# Patient Record
Sex: FEMALE | Race: WHITE | NOT HISPANIC OR LATINO | Employment: PART TIME | URBAN - NONMETROPOLITAN AREA
[De-identification: names, ages, dates, MRNs, and addresses within clinical notes are randomized per-mention and may not be internally consistent; named-entity substitution may affect disease eponyms.]

---

## 2017-01-12 ENCOUNTER — HISTORY (OUTPATIENT)
Dept: FAMILY MEDICINE | Facility: OTHER | Age: 42
End: 2017-01-12

## 2017-01-12 ENCOUNTER — OFFICE VISIT - GICH (OUTPATIENT)
Dept: FAMILY MEDICINE | Facility: OTHER | Age: 42
End: 2017-01-12

## 2017-01-12 DIAGNOSIS — B97.89 OTHER VIRAL AGENTS AS THE CAUSE OF DISEASES CLASSIFIED ELSEWHERE: ICD-10-CM

## 2017-01-12 DIAGNOSIS — R09.82 POSTNASAL DRIP: ICD-10-CM

## 2017-01-12 DIAGNOSIS — J02.9 ACUTE PHARYNGITIS: ICD-10-CM

## 2017-01-12 DIAGNOSIS — J06.9 ACUTE UPPER RESPIRATORY INFECTION: ICD-10-CM

## 2017-01-12 LAB — STREP A ANTIGEN - HISTORICAL: NEGATIVE

## 2017-03-06 ENCOUNTER — HISTORY (OUTPATIENT)
Dept: EMERGENCY MEDICINE | Facility: OTHER | Age: 42
End: 2017-03-06

## 2017-05-26 ENCOUNTER — OFFICE VISIT - GICH (OUTPATIENT)
Dept: OBGYN | Facility: OTHER | Age: 42
End: 2017-05-26

## 2017-05-26 DIAGNOSIS — Z98.890 OTHER SPECIFIED POSTPROCEDURAL STATES: ICD-10-CM

## 2017-06-01 LAB — HPV RESULTS - HISTORICAL: NEGATIVE

## 2017-06-08 ENCOUNTER — COMMUNICATION - GICH (OUTPATIENT)
Dept: FAMILY MEDICINE | Facility: OTHER | Age: 42
End: 2017-06-08

## 2017-06-23 ENCOUNTER — HISTORY (OUTPATIENT)
Dept: FAMILY MEDICINE | Facility: OTHER | Age: 42
End: 2017-06-23

## 2017-06-23 ENCOUNTER — OFFICE VISIT - GICH (OUTPATIENT)
Dept: FAMILY MEDICINE | Facility: OTHER | Age: 42
End: 2017-06-23

## 2017-06-23 DIAGNOSIS — E55.9 VITAMIN D DEFICIENCY: ICD-10-CM

## 2017-06-23 DIAGNOSIS — M25.571 PAIN IN RIGHT ANKLE: ICD-10-CM

## 2017-06-23 DIAGNOSIS — L65.9 NONSCARRING HAIR LOSS: ICD-10-CM

## 2017-06-23 DIAGNOSIS — A63.0 ANOGENITAL (VENEREAL) WARTS: ICD-10-CM

## 2017-06-23 LAB — VITAMIN D TOTAL - HISTORICAL: 22.2 NG/ML

## 2017-06-26 LAB
HEPATITIS C ANTIBODY CATEGORY - HISTORICAL: NORMAL
HIV-1/HIV-2 ANTIBODY CATEGORY - HISTORICAL: NORMAL

## 2017-06-27 LAB — TREPONEMA PALLIDUM - HISTORICAL: NEGATIVE

## 2017-08-24 ENCOUNTER — OFFICE VISIT - GICH (OUTPATIENT)
Dept: FAMILY MEDICINE | Facility: OTHER | Age: 42
End: 2017-08-24

## 2017-08-24 ENCOUNTER — HISTORY (OUTPATIENT)
Dept: FAMILY MEDICINE | Facility: OTHER | Age: 42
End: 2017-08-24

## 2017-08-24 DIAGNOSIS — N92.6 IRREGULAR MENSTRUATION: ICD-10-CM

## 2017-08-24 DIAGNOSIS — M25.371 OTHER INSTABILITY, RIGHT ANKLE: ICD-10-CM

## 2017-08-25 ENCOUNTER — HOSPITAL ENCOUNTER (OUTPATIENT)
Dept: RADIOLOGY | Facility: OTHER | Age: 42
End: 2017-08-25
Attending: FAMILY MEDICINE

## 2017-08-25 DIAGNOSIS — M25.371 OTHER INSTABILITY, RIGHT ANKLE: ICD-10-CM

## 2017-08-26 ENCOUNTER — AMBULATORY - GICH (OUTPATIENT)
Dept: FAMILY MEDICINE | Facility: OTHER | Age: 42
End: 2017-08-26

## 2017-08-26 DIAGNOSIS — S93.491D SPRAIN OF OTHER LIGAMENT OF RIGHT ANKLE, SUBSEQUENT ENCOUNTER: ICD-10-CM

## 2017-08-26 DIAGNOSIS — S93.421D SPRAIN OF DELTOID LIGAMENT OF RIGHT ANKLE, SUBSEQUENT ENCOUNTER: ICD-10-CM

## 2017-09-01 ENCOUNTER — COMMUNICATION - GICH (OUTPATIENT)
Dept: FAMILY MEDICINE | Facility: OTHER | Age: 42
End: 2017-09-01

## 2017-09-01 ENCOUNTER — HOSPITAL ENCOUNTER (OUTPATIENT)
Dept: RADIOLOGY | Facility: OTHER | Age: 42
End: 2017-09-01
Attending: FAMILY MEDICINE

## 2017-09-01 DIAGNOSIS — N92.6 IRREGULAR MENSTRUATION: ICD-10-CM

## 2017-09-28 ENCOUNTER — AMBULATORY - GICH (OUTPATIENT)
Dept: SCHEDULING | Facility: OTHER | Age: 42
End: 2017-09-28

## 2017-10-16 ENCOUNTER — OFFICE VISIT - GICH (OUTPATIENT)
Dept: FAMILY MEDICINE | Facility: OTHER | Age: 42
End: 2017-10-16

## 2017-10-16 ENCOUNTER — HISTORY (OUTPATIENT)
Dept: FAMILY MEDICINE | Facility: OTHER | Age: 42
End: 2017-10-16

## 2017-10-16 DIAGNOSIS — G43.109 MIGRAINE WITH AURA AND WITHOUT STATUS MIGRAINOSUS, NOT INTRACTABLE: ICD-10-CM

## 2017-10-16 DIAGNOSIS — M25.571 PAIN IN RIGHT ANKLE: ICD-10-CM

## 2017-10-16 DIAGNOSIS — Z01.810 ENCOUNTER FOR PREPROCEDURAL CARDIOVASCULAR EXAMINATION: ICD-10-CM

## 2017-10-16 LAB
HCG UR QL: NEGATIVE
HEMOGLOBIN: 13 G/DL (ref 12–16)
MCV RBC AUTO: 93 FL (ref 80–100)

## 2017-10-18 ENCOUNTER — AMBULATORY - GICH (OUTPATIENT)
Dept: FAMILY MEDICINE | Facility: OTHER | Age: 42
End: 2017-10-18

## 2017-10-18 DIAGNOSIS — Z01.810 ENCOUNTER FOR PREPROCEDURAL CARDIOVASCULAR EXAMINATION: ICD-10-CM

## 2017-10-18 DIAGNOSIS — M25.571 PAIN IN RIGHT ANKLE: ICD-10-CM

## 2017-10-20 ENCOUNTER — AMBULATORY - GICH (OUTPATIENT)
Dept: FAMILY MEDICINE | Facility: OTHER | Age: 42
End: 2017-10-20

## 2017-10-20 DIAGNOSIS — Z01.810 ENCOUNTER FOR PREPROCEDURAL CARDIOVASCULAR EXAMINATION: ICD-10-CM

## 2017-11-06 ENCOUNTER — AMBULATORY - GICH (OUTPATIENT)
Dept: SCHEDULING | Facility: OTHER | Age: 42
End: 2017-11-06

## 2017-11-09 ENCOUNTER — HISTORY (OUTPATIENT)
Dept: FAMILY MEDICINE | Facility: OTHER | Age: 42
End: 2017-11-09

## 2017-12-28 NOTE — PROGRESS NOTES
Patient Information     Patient Name MRN Sex Bibaina Agustin [id number] Female 1975      Progress Notes by Margoth Ramachandran DO at 2017  9:30 AM     Author:  Margoth Ramachandran DO Service:  (none) Author Type:  PHYS- Osteopathic     Filed:  2017  7:31 PM Encounter Date:  2017 Status:  Signed     :  Margoth Ramachandran DO (PHYS- Osteopathic)            SUBJECTIVE:  Bibiana Lira is a 41 y.o. female who presents for multiple concerns    HPI  Bibiana is here for:  1. R ankle instability feeling.  Had normal xray after strain this spring.  Continues to feel instability.  Not able to wear heels.  Feels like she could fall while walking on uneven ground, has difficulty gardening.  Can begin throbbing/aching and almost feel swollen on the R front outside portion of ankle.  Tender around the malleoli b/l on R ankle.  She had a prior fracture of this ankle in  without need for surgical repair.    2. Periods.  Has had intermittent spotting between menstrual cycles.  Not on any contraception.  Has been going on for ~1.5 years.  Not every month.  Has had normal pap smear 2016.  She is concerned with any abnormalities in the uterus.    3. Ear pains.  Feels like she has to use Qtips and try harder at releasing any fluid in her ears.  Wondering if there is anything wrong.  Not that bothersome, but she thought since she was here she would have it looked at.  No affect on hearing.    Allergies      Allergen   Reactions     Dairy [Lactase]  *Unknown     Had muscle testing done       Gluten  Intolerance-Can't Take     Orange  *Unknown     Had muscle testing- reacted to oranges       Sulfa (Sulfonamide Antibiotics)  Erythema     Sulfa (Sulfonamide Antibiotics)       Verified in [employer]: Y, Severity in [employer]: S      Sulfadiazine  Rash   ,   Current Outpatient Prescriptions on File Prior to Visit       Medication  Sig Dispense Refill     SUMAtriptan (IMITREX) 25 mg tablet Take 0.5-1 tablets by  mouth every 2 hours if needed for Migraine. Max dose: 200mg per 24 hrs. 20 tablet 2     No current facility-administered medications on file prior to visit.     and   Past Medical History:     Diagnosis  Date     Hx of pregnancy     , both vaginal deliveries.  Prolapsed uterus with first delivery.      Irritable bowel syndrome (IBS)     avoids caramel color; gluten free        REVIEW OF SYSTEMS:  Review of Systems   All other systems reviewed and are negative.      OBJECTIVE:  /70  Pulse 76  Wt 69.4 kg (153 lb)  BMI 25.46 kg/m2    EXAM:   Physical Exam   Constitutional: She is well-developed, well-nourished, and in no distress.   HENT:   Head: Normocephalic and atraumatic.   Right Ear: External ear normal.   Left Ear: External ear normal.   Eyes: EOM are normal. Pupils are equal, round, and reactive to light.   Neck: Normal range of motion. Neck supple.   Cardiovascular: Normal rate and normal heart sounds.    Pulmonary/Chest: Effort normal and breath sounds normal.   Abdominal: Soft. Bowel sounds are normal.   Musculoskeletal:        Right ankle: She exhibits normal range of motion, no swelling, no ecchymosis, no deformity, no laceration and normal pulse. Tenderness. Lateral malleolus (mild) and medial malleolus (mild) tenderness found. Achilles tendon exhibits no pain, no defect and normal Willis's test results.   Psychiatric: Mood and affect normal.   Nursing note and vitals reviewed.      ASSESSMENT/PLAN:    ICD-10-CM    1. Right ankle instability M25.371 MR ANKLE RIGHT WO   2. Menstrual irregularity N92.6 US PELVIS COMPLETE TA AND TV        Plan:  1. R ankle instability.  Not high probability for fracture, but concern for ligament laxity.  Reviewed need for further imaging.  Will plan for MRI.  Encouraged bracing which was not done between last visit and now.  Will consider referral to foot/ankle if abnormality exists.    2. Menstrual cycle with breakthrough bleeding.  Reassurance given, as  likely the start of some perimenstrual changes.  Would be more concerning if periods become irregular.  However, patient remains anxious about possibility of more ominous sign, due to an experience of a personal friend.  Will complete pelvic US to evaluate for any fibroid or other cause.  OBGYN referral if abnormal.    Reassurance re: otalgia.    Follow up pending above evaluation.

## 2017-12-28 NOTE — PROGRESS NOTES
Patient Information     Patient Name MRN Bibiana Olmos [id number] Female 1975      Progress Notes by Sandra Concepcion RN at 10/20/2017  8:45 AM     Author:  Sandra Concepcion RN Service:  (none) Author Type:  NURS- Registered Nurse     Filed:  10/20/2017  9:06 AM Encounter Date:  10/20/2017 Status:  Signed     :  Sandra Concepcion RN (NURS- Registered Nurse)            Patient here for EKG ordered by Cherie Doe at pre-op exam for surgery. Copy of EKG sent to [facility] to fax to [facility] and copy to scan into chart. SANDRA CONCEPCION RN ....................  10/20/2017   8:58 AM

## 2017-12-28 NOTE — PROGRESS NOTES
Patient Information     Patient Name MRN Sex Bibiana Agustin [id number] Female 1975      Progress Notes by Audra Tidwell R.T. ([facility] at 2017 11:12 AM     Author:  Audra Tidwell R.T. ([facility]RT) Service:  (none) Author Type:  [employer] - Registered Radiologic Technologist     Filed:  2017 11:12 AM Date of Service:  2017 11:12 AM Status:  Signed     :  Audra Tidwell R.T. (ARRT) ([employer] - Registered Radiologic Technologist)            Falls Risk Criteria:    Age 65 and older or under age 4        Sensory deficits    Poor vision    Use of ambulatory aides    Impaired judgment    Unable to walk independently    Meets High Risk criteria for falls:  no

## 2017-12-28 NOTE — PROGRESS NOTES
Patient Information     Patient Name MRN Sex Bibiana Agustin [id number] Female 1975      Progress Notes by Cherie Doe PA-C at 10/16/2017 10:45 AM     Author:  Cherie Doe PA-C Service:  (none) Author Type:  PHYS- Physician Assistant     Filed:  10/16/2017 12:20 PM Encounter Date:  10/16/2017 Status:  Signed     :  Cherie Doe PA-C ([facility] Physician Assistant)            ----------------- PREOPERATIVE EXAM ------------------  10/16/2017    SUBJECTIVE:  Bibiana Lira is a 42 y.o. female here for preoperative optimization.    I was asked to see Bibiana Lira by Dr. Ren for a preoperative optimization due to history of migraines.     Patient has a history of right ankle pain in 2017 s/p fall on ice.  Ankle is stiff in the morning when she wakes up.  Aggravated by uneven surfaces.     Patient is otherwise feeling fine. No recent fevers, chills, chest pain, palpitations, problems breathing, GI symptoms, urinary symptoms. Takes ibuprofen for migraines. Stable migraines. No cough or cold symptoms.     Nursing Notes:   Bibiana Escoto  10/16/2017 11:24 AM  Signed  Date of Surgery: 10/26/17  Type of Surgery: Right ankle ligament repair  Surgeon: Dr. Ren  Hospital[facility]  [facility]      Fever/Chills or other infectious symptoms in past month: Sinus drainage  >10lb weight loss in past two months: no    Health Care Directive/Code status:  Full Code  Hx of blood transfusions:   (NO)   Td up to date:  12  History of VRE/MRSA:  (NO) Date: N/A    Preoperative Evaluation: Obstructive Sleep Apnea screening    S: Snore -  Do you snore loudly? (louder than talking or loud enough to be heard through closed doors)(NO)  T: Tired - Do you often feel tired, fatigued, or sleepy during the daytime?(YES)  O: Observed - Has anyone ever observed you stop breathing during your sleep?(NO)  P: Pressure - Do you have or are you being treated for high blood pressure?(NO)  B: BMI - BMI  "greater than 35kg/m2?(NO)  A: Age - Age over 50 years old?(NO)  N: Neck - Neck circumference greater than 40 cm?(NO)  G: Gender - Gender: Male?(NO)    Total number of \"YES\" responses:  1    Scoring: Low risk of SELENA 0-2  At Risk of SELENA: >3 High Risk of SELENA: 5-8    Bibianamendy Escoto LPN........................10/16/2017  11:12 AM          Patient Active Problem List       Diagnosis  Date Noted     Menstrual irregularity  2017     Breakthrough spotting started Feb-2016: off and on.        Right ankle instability  2017     S/p fracture in  and severe sprain early .        Migraine, unspecified, without mention of intractable migraine without mention of status migrainosus  2009     Migraine Headache            Past Medical History:     Diagnosis  Date     Hx of pregnancy     , both vaginal deliveries.  Prolapsed uterus with first delivery.      Irritable bowel syndrome (IBS)     avoids caramel color; gluten free        Past Surgical History:      Procedure  Laterality Date     WISDOM TEETH EXTRACTION      [facility] teeth extraction         Current Outpatient Prescriptions       Medication  Sig Dispense Refill     SUMAtriptan (IMITREX) 25 mg tablet Take 0.5-1 tablets by mouth every 2 hours if needed for Migraine. Max dose: 200mg per 24 hrs. 20 tablet 2     No current facility-administered medications for this visit.      Medications have been reviewed by me and are current to the best of my knowledge and ability.    Recent use of: no recent use of aspirin (ASA) or steroids. Recent use of ibuprofen. Instructed to hold use of ibuprofen 7 days prior to surgery.     Allergies:  Allergies      Allergen   Reactions     Dairy [Lactase]  *Unknown     Had muscle testing done       Gluten  Intolerance-Can't Take     Orange  *Unknown     Had muscle testing- reacted to oranges       Sulfa (Sulfonamide Antibiotics)  Erythema     Sulfa (Sulfonamide Antibiotics)       Verified in [employer]: Y, Severity in " [employer]: S      Sulfadiazine  Rash       Latex allergy  no    Family History       Problem   Relation Age of Onset     Diabetes  Mother      Hypertension  Mother      Heart Disease  Mother      pacemaker       Other  Mother      Stoke x 2       Hyperlipidemia  Mother      Other  Father      Actinic keratoses       Hypertension  Father      Hyperlipidemia  Father      Diabetes type II  Father      Good Health  Brother      Other  Sister      Gluten intolerance         Denies family hx of bleeding tendencies, anesthesia complications, or other problems with surgery.      Social History       Substance Use Topics         Smoking status:   Never Smoker     Smokeless tobacco:   Never Used     Alcohol use   Yes      Comment: rare          ROS:    surgical:  patient denies previous complications from prior surgeries including but not limited to prolonged bleeding, anesthesia complications, dysrhythmias, surgical wound infections, or prolonged hospital stay.    Patient previously had wisdom teeth surgery. She states that she was nauseas a few days after the surgery from the anesthesia. She does not recall what type of anesthesia medication they used. She also states that it took her longer than normal to come out of anesthesia and was groggy for an extended period of time after waking up from the anesthesia.       REVIEW OF SYSTEMS:  A comprehensive review of systems was negative except for items noted in HPI/Subjective.    Constitutional: Negative for fever , chills  and fatigue .   Eyes: Negative for irritation  and redness .  Ears, nose, mouth, throat, and face: Negative for ear drainage , nasal congestion , sore throat and hoarseness .  Respiratory: Negative for cough, sputum production , hemoptysis, dyspnea  and wheezing .  Cardiovascular: Negative for chest discomfort, palpitations and lightheadedness .  Gastrointestinal: Negative for dysphagia , nausea , vomiting , melena , diarrhea , constipation  and abdominal  "pain.  Genitourinary:Negative for frequency, dysuria , urinary incontinence , hematuria.  Integument/breast: Negative for rash .   Hematologic/lymphatic: Negative for easy bruising , bleeding, lymphadenopathy  and petechiae.   Musculoskeletal:Negative for myalgias and arthralgias .  Neurological: Negative for headaches, dizziness , vertigo , seizures  and weakness.   Psychiatric: Negative for anxiety , depression , panic attacks  and suicidal ideations .        -------------------------------------------------------------    PHYSICAL EXAM:  /64  Pulse 64  Temp 97.9  F (36.6  C) (Tympanic)  Ht 1.651 m (5' 5\")  Wt 69 kg (152 lb 3.2 oz)  LMP 10/02/2017 (Approximate)  SpO2 98%  Breastfeeding? No  BMI 25.33 kg/m2    EXAM:  General Appearance: Pleasant, alert, appropriate appearance for age. No acute distress  Head Exam: Normal. Normocephalic, atraumatic.  Eye Exam: Normal external eye, conjunctiva, lids, cornea. SCHUYLER.  Ear Exam: Normal TM's bilaterally. Normal auditory canals and external ears. Non-tender.  Nose Exam: Normal external nose, mucus membranes, and septum.  OroPharynx Exam: Dental hygiene adequate. Normal buccal mucosa. Normal pharynx.  Neck Exam: Supple, no masses or nodes., no lymphadenopathy  Thyroid Exam: Normal., No nodules or enlargement., normal to palpation  Chest/Respiratory Exam: Normal chest wall and respirations. Clear to auscultation.  Cardiovascular Exam: Regular rate and rhythm. S1, S2, no murmur, click, gallop, or rubs.  Gastrointestinal Exam: Soft, nontender, no abnormal masses or organomegaly. BS x 4.  Lymphatic Exam: Normal.  Musculoskeletal Exam: Back is straight and non-tender, full ROM of upper and lower extremities.  Skin: no rash or abnormalities  Neurologic Exam:symmetric DTRs, normal gross motor movement, tone, and coordination. No tremor.  Psychiatric Exam: Alert and oriented, appropriate affect.    PHQ Depression Screen  Date of PHQ exam: 10/16/17  Over the last 2 " weeks, how often have you been bothered by any of the following problems?  1. Little interest or pleasure in doing things: 0 - Not at all  2. Feeling down, depressed, or hopeless: 0 - Not at all         Patient can walk up a flight of stairs without becoming short of breath or having chest pain: YES   Patient is able to tolerate greater than 4 METs of activity without any cardiopulmonary symptoms.    LABS:    Results for orders placed or performed in visit on 10/16/17       HEMOGLOBIN       Result  Value Ref Range Status    HEMOGLOBIN                13.0 12.0 - 16.0 g/dL Final    MCV                       93 80 - 100 fL Final   Urine pregnancy test (HCG), qualitative       Result  Value Ref Range Status    PREGNANCY,URINE           Negative Negative Final         CXR:  Not necessary    EKG:  Not necessary  ---------------------------------------------------------------    No family history of problems with bleeding or anesthetia.    ASA PS class 2. Patient's perioperative risk is minimized and no further cardiopulmonary workup is neccesary.  Please contact the office with any questions or concerns.      ICD-10-CM    1. Preop cardiovascular exam Z01.810 HEMOGLOBIN      Urine pregnancy test (HCG), qualitative      HEMOGLOBIN      Urine pregnancy test (HCG), qualitative   2. Acute right ankle pain M25.571    3. Migraine with aura and without status migrainosus, not intractable G43.109        ASSESSEMNT AND PLAN:  1.  Preoperative history and physical   consults:  None  Patient is approved for the surgery.  No concerns.     For above listed surgery and anesthesia:     - Patient is low  risk for perioperative complications.     Patient was administered the following vaccines today: none.  Completed labs today: hemoglobin, UPT, no concerns.    PRE OP RECOMMENDATIONS:  Patient is on chronic pain medications (NO)   Patient is on antiplatlet/anticoagulation (NO)   Other medications that need adjustment perioperatively (NO)      Patient Instructions   Patient should take the following medications the morning of surgery with a small sip of water: hold all meds.  Patient was instructed to hold the following medications the morning of surgery: hold all meds.     Patient was advised to call our office and the surgical services with any change in condition or new symptoms if they were to develop between today and their surgical date.  Especially any cardiopulmonary symptoms or symptoms concerning for an infection.     Discontinue aspirin, aleve, naproxen and ibuprofen 7 days prior to surgery to reduce bleeding risk.  It is fine to take tylenol the week before surgery.  Hold vitamins and herbal remedies for 7 days before surgery.        Other:  Patient was advised to call our office and the surgical services with any change in condition or new symptoms if they were to develop between today and their surgical date.  Especially any cardiopulmonary symptoms or symptoms concerning for an infection.     PRE OP RECOMMENDATIONS:  Discontinue ASA 7 days prior to reduce bleeding risk and Discontinue NSAIDS 7 days prior to procedure to reduce bleeding risk    Greater than 25 minutes were spent in counseling and coordination of care.    Cherie Doe PA-C..................10/16/2017 11:21 AM

## 2017-12-28 NOTE — PROGRESS NOTES
Patient Information     Patient Name MRN Sex Bibiana Agustin [id number] Female 1975      Progress Notes by Cherie Doe PA-C at 10/18/2017 12:25 PM     Author:  Cherie Doe PA-C Service:  (none) Author Type:  PHYS- Physician Assistant     Filed:  10/18/2017 12:28 PM Encounter Date:  10/18/2017 Status:  Signed     :  Cherie Doe PA-C (PHYS- Physician Assistant)            Patient needs an EKG for her preop per request from the surgeon. Order placed.   Cherie Doe PA-C ....................  10/18/2017   12:26 PM

## 2017-12-28 NOTE — PROGRESS NOTES
Patient Information     Patient Name MRN Sex Bibiana Agustin [id number] Female 1975      Progress Notes by Margoth Ramachandran DO at 2017  3:45 PM     Author:  Margoth Ramachandran DO Service:  (none) Author Type:  PHYS- Osteopathic     Filed:  2017 11:51 AM Encounter Date:  2017 Status:  Signed     :  Margoth Ramachandran DO (PHYS- Osteopathic)            SUBJECTIVE:  Bibiana Lira is a 41 y.o. female who presents for multiple concerns    HPI  Bibiana is here for concerns of:  1. Needing to recheck her vitamin d.  Was low at our last visit and has been taking 2000 IU daily.  Feeling better for fatigue/energy.  2. Hair thinning.  Wondering about her thyroid level and any other causes.  Front top of head the worst.  Has tried other supplements, shampoos, rogaine - all without relief.  3. HPV infection - recent change.  Has only had two partners (prior ex-, and new boyfriend).  She would like complete STD testing due to yessy that and wanting to make sure there is no other infections.  4. Ankle.  Pain.  Sprained severely ~4 months ago. Significant improvement since that time.  Previously broke same ankle when she was younger.  Now stiff/sore in the morning.  Better as she goes about the day.  Does not wear a brace anymore.  NO specific medications or stretching to the area.    Allergies      Allergen   Reactions     Gluten  Intolerance-Can't Take     Sulfa (Sulfonamide Antibiotics)  Erythema     Sulfa (Sulfonamide Antibiotics)       Verified in [employer]: Y, Severity in [employer]: S      Sulfadiazine  Rash   ,   Current Outpatient Prescriptions on File Prior to Visit       Medication  Sig Dispense Refill     SUMAtriptan (IMITREX) 25 mg tablet Take 0.5-1 tablets by mouth every 2 hours if needed for Migraine. Max dose: 200mg per 24 hrs. 20 tablet 2     No current facility-administered medications on file prior to visit.     and   Past Medical History:     Diagnosis  Date     Hx of  pregnancy     , both vaginal deliveries.  Prolapsed uterus with first delivery.      Irritable bowel syndrome (IBS)     avoids caramel color; gluten free        REVIEW OF SYSTEMS:  Review of Systems   All other systems reviewed and are negative.      OBJECTIVE:  /78  Pulse 84  Wt 70.6 kg (155 lb 9.6 oz)  BMI 25.89 kg/m2    EXAM:   Physical Exam   Constitutional: She is well-developed, well-nourished, and in no distress.   HENT:   Head: Normocephalic and atraumatic.   Right Ear: External ear normal.   Left Ear: External ear normal.   Eyes: EOM are normal. Pupils are equal, round, and reactive to light.   Neck: Normal range of motion. Neck supple.   Cardiovascular: Normal rate and normal heart sounds.    Pulmonary/Chest: Effort normal and breath sounds normal.   Musculoskeletal:        Right ankle: Normal.   Nursing note and vitals reviewed.      ASSESSMENT/PLAN:    ICD-10-CM    1. 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 Vitamin D deficiency E55.9 VITAMIN D 25 (DEFICIENCY)      VITAMIN D 25 (DEFICIENCY)   3. Right ankle pain, unspecified chronicity M25.571    4. Hair thinning L65.9         Plan:    1. HPV infection s/p leep and now normal pap smear last month.  We will complete HIV, treponema, HCV testing today.  2. Vitamin D rechecked today.  Will notify of recommendations when results available.  3. R ankle pain, s/p trauma.  Arthritis of joint.  Reviewed wearing support when upright at work/exercising/walking.  Will likely improve morning symptoms with time.  4. Hair thinning.  Reassurance.  Normal thyroid testing previously.    Follow up prn; pending above results.

## 2017-12-29 NOTE — H&P
Patient Information     Patient Name MRN Sex Bibiana Agustin [id number] Female 1975      H&P by Cherie Doe PA-C at 10/16/2017 10:45 AM     Author:  Cherie Doe PA-C Service:  (none) Author Type:  PHYS- Physician Assistant     Filed:  10/16/2017 12:20 PM Encounter Date:  10/16/2017 Status:  Signed     :  Cherie Doe PA-C (REYNALDO Physician Assistant)            ----------------- PREOPERATIVE EXAM ------------------  10/16/2017    SUBJECTIVE:  Bibiana Lira is a 42 y.o. female here for preoperative optimization.    I was asked to see Bibiana Lira by Dr. Ren for a preoperative optimization due to history of migraines.     Patient has a history of right ankle pain in 2017 s/p fall on ice.  Ankle is stiff in the morning when she wakes up.  Aggravated by uneven surfaces.     Patient is otherwise feeling fine. No recent fevers, chills, chest pain, palpitations, problems breathing, GI symptoms, urinary symptoms. Takes ibuprofen for migraines. Stable migraines. No cough or cold symptoms.     Nursing Notes:   Bibiana Escoto  10/16/2017 11:24 AM  Signed  Date of Surgery: 10/26/17  Type of Surgery: Right ankle ligament repair  Surgeon: Dr. Ren  Hospital:  [facility]      Fever/Chills or other infectious symptoms in past month: Sinus drainage  >10lb weight loss in past two months: no    Health Care Directive/Code status:  Full Code  Hx of blood transfusions:   (NO)   Td up to date:  12  History of VRE/MRSA:  (NO) Date: N/A    Preoperative Evaluation: Obstructive Sleep Apnea screening    S: Snore -  Do you snore loudly? (louder than talking or loud enough to be heard through closed doors)(NO)  T: Tired - Do you often feel tired, fatigued, or sleepy during the daytime?(YES)  O: Observed - Has anyone ever observed you stop breathing during your sleep?(NO)  P: Pressure - Do you have or are you being treated for high blood pressure?(NO)  B: BMI - BMI greater  "than 35kg/m2?(NO)  A: Age - Age over 50 years old?(NO)  N: Neck - Neck circumference greater than 40 cm?(NO)  G: Gender - Gender: Male?(NO)    Total number of \"YES\" responses:  1    Scoring: Low risk of SELENA 0-2  At Risk of SELENA: >3 High Risk of SELENA: 5-8    Bibianamendy Escoto LPN........................10/16/2017  11:12 AM          Patient Active Problem List       Diagnosis  Date Noted     Menstrual irregularity  2017     Breakthrough spotting started Feb-2016: off and on.        Right ankle instability  2017     S/p fracture in  and severe sprain early .        Migraine, unspecified, without mention of intractable migraine without mention of status migrainosus  2009     Migraine Headache            Past Medical History:     Diagnosis  Date     Hx of pregnancy     , both vaginal deliveries.  Prolapsed uterus with first delivery.      Irritable bowel syndrome (IBS)     avoids caramel color; gluten free        Past Surgical History:      Procedure  Laterality Date     WISDOM TEETH EXTRACTION      [place] teeth extraction         Current Outpatient Prescriptions       Medication  Sig Dispense Refill     SUMAtriptan (IMITREX) 25 mg tablet Take 0.5-1 tablets by mouth every 2 hours if needed for Migraine. Max dose: 200mg per 24 hrs. 20 tablet 2     No current facility-administered medications for this visit.      Medications have been reviewed by me and are current to the best of my knowledge and ability.    Recent use of: no recent use of aspirin (ASA) or steroids. Recent use of ibuprofen. Instructed to hold use of ibuprofen 7 days prior to surgery.     Allergies:  Allergies      Allergen   Reactions     Dairy [Lactase]  *Unknown     Had muscle testing done       Gluten  Intolerance-Can't Take     Orange  *Unknown     Had muscle testing- reacted to oranges       Sulfa (Sulfonamide Antibiotics)  Erythema     Sulfa (Sulfonamide Antibiotics)       Verified in [employer]: Y, Severity in " [place]: S      Sulfadiazine  Rash       Latex allergy  no    Family History       Problem   Relation Age of Onset     Diabetes  Mother      Hypertension  Mother      Heart Disease  Mother      pacemaker       Other  Mother      Stoke x 2       Hyperlipidemia  Mother      Other  Father      Actinic keratoses       Hypertension  Father      Hyperlipidemia  Father      Diabetes type II  Father      Good Health  Brother      Other  Sister      Gluten intolerance         Denies family hx of bleeding tendencies, anesthesia complications, or other problems with surgery.      Social History       Substance Use Topics         Smoking status:   Never Smoker     Smokeless tobacco:   Never Used     Alcohol use   Yes      Comment: rare          ROS:    surgical:  patient denies previous complications from prior surgeries including but not limited to prolonged bleeding, anesthesia complications, dysrhythmias, surgical wound infections, or prolonged hospital stay.    Patient previously had wisdom teeth surgery. She states that she was nauseas a few days after the surgery from the anesthesia. She does not recall what type of anesthesia medication they used. She also states that it took her longer than normal to come out of anesthesia and was groggy for an extended period of time after waking up from the anesthesia.       REVIEW OF SYSTEMS:  A comprehensive review of systems was negative except for items noted in HPI/Subjective.    Constitutional: Negative for fever , chills  and fatigue .   Eyes: Negative for irritation  and redness .  Ears, nose, mouth, throat, and face: Negative for ear drainage , nasal congestion , sore throat and hoarseness .  Respiratory: Negative for cough, sputum production , hemoptysis, dyspnea  and wheezing .  Cardiovascular: Negative for chest discomfort, palpitations and lightheadedness .  Gastrointestinal: Negative for dysphagia , nausea , vomiting , melena , diarrhea , constipation  and abdominal  "pain.  Genitourinary:Negative for frequency, dysuria , urinary incontinence , hematuria.  Integument/breast: Negative for rash .   Hematologic/lymphatic: Negative for easy bruising , bleeding, lymphadenopathy  and petechiae.   Musculoskeletal:Negative for myalgias and arthralgias .  Neurological: Negative for headaches, dizziness , vertigo , seizures  and weakness.   Psychiatric: Negative for anxiety , depression , panic attacks  and suicidal ideations .        -------------------------------------------------------------    PHYSICAL EXAM:  /64  Pulse 64  Temp 97.9  F (36.6  C) (Tympanic)  Ht 1.651 m (5' 5\")  Wt 69 kg (152 lb 3.2 oz)  LMP 10/02/2017 (Approximate)  SpO2 98%  Breastfeeding? No  BMI 25.33 kg/m2    EXAM:  General Appearance: Pleasant, alert, appropriate appearance for age. No acute distress  Head Exam: Normal. Normocephalic, atraumatic.  Eye Exam: Normal external eye, conjunctiva, lids, cornea. SCHUYLER.  Ear Exam: Normal TM's bilaterally. Normal auditory canals and external ears. Non-tender.  Nose Exam: Normal external nose, mucus membranes, and septum.  OroPharynx Exam: Dental hygiene adequate. Normal buccal mucosa. Normal pharynx.  Neck Exam: Supple, no masses or nodes., no lymphadenopathy  Thyroid Exam: Normal., No nodules or enlargement., normal to palpation  Chest/Respiratory Exam: Normal chest wall and respirations. Clear to auscultation.  Cardiovascular Exam: Regular rate and rhythm. S1, S2, no murmur, click, gallop, or rubs.  Gastrointestinal Exam: Soft, nontender, no abnormal masses or organomegaly. BS x 4.  Lymphatic Exam: Normal.  Musculoskeletal Exam: Back is straight and non-tender, full ROM of upper and lower extremities.  Skin: no rash or abnormalities  Neurologic Exam:symmetric DTRs, normal gross motor movement, tone, and coordination. No tremor.  Psychiatric Exam: Alert and oriented, appropriate affect.    PHQ Depression Screen  Date of PHQ exam: 10/16/17  Over the last 2 " weeks, how often have you been bothered by any of the following problems?  1. Little interest or pleasure in doing things: 0 - Not at all  2. Feeling down, depressed, or hopeless: 0 - Not at all         Patient can walk up a flight of stairs without becoming short of breath or having chest pain: YES   Patient is able to tolerate greater than 4 METs of activity without any cardiopulmonary symptoms.    LABS:    Results for orders placed or performed in visit on 10/16/17       HEMOGLOBIN       Result  Value Ref Range Status    HEMOGLOBIN                13.0 12.0 - 16.0 g/dL Final    MCV                       93 80 - 100 fL Final   Urine pregnancy test (HCG), qualitative       Result  Value Ref Range Status    PREGNANCY,URINE           Negative Negative Final         CXR:  Not necessary    EKG:  Not necessary  ---------------------------------------------------------------    No family history of problems with bleeding or anesthetia.    ASA PS class 2. Patient's perioperative risk is minimized and no further cardiopulmonary workup is neccesary.  Please contact the office with any questions or concerns.      ICD-10-CM    1. Preop cardiovascular exam Z01.810 HEMOGLOBIN      Urine pregnancy test (HCG), qualitative      HEMOGLOBIN      Urine pregnancy test (HCG), qualitative   2. Acute right ankle pain M25.571    3. Migraine with aura and without status migrainosus, not intractable G43.109        ASSESSEMNT AND PLAN:  1.  Preoperative history and physical   consults:  None  Patient is approved for the surgery.  No concerns.     For above listed surgery and anesthesia:     - Patient is low  risk for perioperative complications.     Patient was administered the following vaccines today: none.  Completed labs today: hemoglobin, UPT, no concerns.    PRE OP RECOMMENDATIONS:  Patient is on chronic pain medications (NO)   Patient is on antiplatlet/anticoagulation (NO)   Other medications that need adjustment perioperatively (NO)      Patient Instructions   Patient should take the following medications the morning of surgery with a small sip of water: hold all meds.  Patient was instructed to hold the following medications the morning of surgery: hold all meds.     Patient was advised to call our office and the surgical services with any change in condition or new symptoms if they were to develop between today and their surgical date.  Especially any cardiopulmonary symptoms or symptoms concerning for an infection.     Discontinue aspirin, aleve, naproxen and ibuprofen 7 days prior to surgery to reduce bleeding risk.  It is fine to take tylenol the week before surgery.  Hold vitamins and herbal remedies for 7 days before surgery.        Other:  Patient was advised to call our office and the surgical services with any change in condition or new symptoms if they were to develop between today and their surgical date.  Especially any cardiopulmonary symptoms or symptoms concerning for an infection.     PRE OP RECOMMENDATIONS:  Discontinue ASA 7 days prior to reduce bleeding risk and Discontinue NSAIDS 7 days prior to procedure to reduce bleeding risk    Greater than 25 minutes were spent in counseling and coordination of care.    Cherie Doe PA-C..................10/16/2017 11:21 AM

## 2017-12-29 NOTE — PATIENT INSTRUCTIONS
Patient Information     Patient Name MRN Bibiana Olmos [id number] Female 1975      Patient Instructions by Cherie Doe PA-C at 10/16/2017 10:45 AM     Author:  Cherie Doe PA-C Service:  (none) Author Type:  PHYS- Physician Assistant     Filed:  10/16/2017 11:22 AM Encounter Date:  10/16/2017 Status:  Signed     :  Cherie Doe PA-C (PHYS- Physician Assistant)            Patient should take the following medications the morning of surgery with a small sip of water: hold all meds.  Patient was instructed to hold the following medications the morning of surgery: hold all meds.     Patient was advised to call our office and the surgical services with any change in condition or new symptoms if they were to develop between today and their surgical date.  Especially any cardiopulmonary symptoms or symptoms concerning for an infection.     Discontinue aspirin, aleve, naproxen and ibuprofen 7 days prior to surgery to reduce bleeding risk.  It is fine to take tylenol the week before surgery.  Hold vitamins and herbal remedies for 7 days before surgery.

## 2018-01-02 NOTE — NURSING NOTE
Patient Information     Patient Name MRN Bibiana Olmos [id number] Female 1975      Nursing Note by Gauri Mcclellan at 2017 10:45 AM     Author:  Gauri Mcclellan Service:  (none) Author Type:  (none)     Filed:  2017 10:40 AM Encounter Date:  2017 Status:  Signed     :  Gauri Mcclellan            Patient presents to the clinic today with a sore throat that has been onset for about three to five days. Patient states throat started to feel like swelling last night. Patient states she is taking Ibuprofen 400mg about every four hours.  Gauri Mcclellan CMA (AAMA)........2017 10:34 AM

## 2018-01-03 NOTE — PATIENT INSTRUCTIONS
Patient Information     Patient Name MRN Bibiana Olmos [id number] Female 1975      Patient Instructions by Arielle Ambrose NP at 2017 10:45 AM     Author:  Arielle Ambrose NP  Service:  (none) Author Type:  PHYS- Nurse Practitioner     Filed:  2017 11:04 AM  Encounter Date:  2017 Status:  Addendum     :  Arielle Ambrose NP (PHYS- Nurse Practitioner)        Related Notes: Original Note by Arielle Ambrose NP (PHYS- Nurse Practitioner) filed at 2017 11:04 AM            Strep test is negative  Symptoms likely related to viral illness and post nasal drainage  Symptomatic management  Follow up as needed

## 2018-01-03 NOTE — PROGRESS NOTES
Patient Information     Patient Name MRN Sex Bibiana Agustin [id number] Female 1975      Progress Notes by Arielle Ambrose NP at 2017 10:45 AM     Author:  Arielle Ambrose NP Service:  (none) Author Type:  PHYS- Nurse Practitioner     Filed:  2017 11:09 AM Encounter Date:  2017 Status:  Signed     :  Arielle Ambrose NP (PHYS- Nurse Practitioner)            HPI:    Bibiana Lira is a 41 y.o. female who presents to clinic today for sore throat. Swollen, painful sore throat that started last night. Has felt a little run down past couple days. She is feeling body aches. Having LGT, currently 99.12 with ibuprofen on board. Denies any other cold sx. Did have head cold last week, this seemed to resolve over the past weekend. She does still have some mild PND. Taking ibuprofen as well as vitamin C and zycam for sx. Has been under a lot of stress recently.     Past Medical History      Diagnosis   Date     Hx of pregnancy       , both vaginal deliveries.  Prolapsed uterus with first delivery.      Irritable bowel syndrome (IBS)       avoids caramel color; gluten free      Past Surgical History       Procedure   Laterality Date     [place] teeth extraction        [place] teeth extraction       Social History     Substance Use Topics       Smoking status: Never Smoker     Smokeless tobacco: Never Used     Alcohol use No     Current Outpatient Prescriptions       Medication  Sig Dispense Refill     SUMAtriptan (IMITREX) 25 mg tablet Take 0.5-1 tablets by mouth every 2 hours if needed for Migraine. Max dose: 200mg per 24 hrs. 20 tablet 2     No current facility-administered medications for this visit.      Medications have been reviewed by me and are current to the best of my knowledge and ability.    Allergies      Allergen   Reactions     Gluten  Intolerance-Can't Take     Sulfa (Sulfonamide Antibiotics)  Erythema     Sulfa (Sulfonamide Antibiotics)       Verified in [employer]: Y, Severity  in [employer]: S      Sulfadiazine  Rash       ROS:  Pertinent positives and negatives are noted in HPI.    EXAM:  General appearance: well appearing female, in no acute distress  Head: normocephalic, atraumatic  Ears: TM's with cone of light, no erythema, canals clear bilaterally  Eyes: conjunctivae normal  Orophayrnx: moist mucous membranes, tonsils with erythema, no exudates or petechiae, no post nasal drip seen  Neck: supple without adenopathy  Respiratory: clear to auscultation bilaterally  Cardiac: RRR with no murmurs  Psychological: normal affect, alert and pleasant  Lab:   Results for orders placed or performed in visit on 01/12/17      THROAT RAPID STREP A WITH REFLEX      Result  Value Ref Range    STREP A ANTIGEN           Negative Negative         ASSESSMENT/PLAN:    ICD-10-CM    1. Viral URI J06.9      B97.89    2. Sore throat J02.9 THROAT RAPID STREP A WITH REFLEX      THROAT RAPID STREP A WITH REFLEX   3. PND (post-nasal drip) R09.82    RST negative. Sx likely viral and PND related. Discussed sx management and s/s that would warrant f/u. All questions were answered and she is in agreement with plan.       Patient Instructions   Strep test is negative  Symptoms likely related to viral illness and post nasal drainage  Symptomatic management  Follow up as needed

## 2018-01-05 NOTE — NURSING NOTE
Patient Information     Patient Name MRN Sex Bibiana Agustin [id number] Female 1975      Nursing Note by Marcie Morin at 2017  8:45 AM     Author:  Marcie Morin Service:  (none) Author Type:  (none)     Filed:  2017  8:42 AM Encounter Date:  2017 Status:  Signed     :  Marcie Morin            Patient presents to clinic for follow up after leep procedure completed 6 months ago.  She will have repeat pap today.    Marcie Morin LPN..................2017  8:40 AM

## 2018-01-05 NOTE — ADDENDUM NOTE
Patient Information     Patient Name MRN Bibiana Olmos [id number] Female 1975      Addendum Note by Asuncion Flores at 2017 12:26 PM     Author:  Asuncion Flores Service:  (none) Author Type:  (none)     Filed:  2017 12:26 PM Encounter Date:  2017 Status:  Signed     :  Asuncion Flores       Addended by: ASUNCION FLORES on: 2017 12:26 PM        Modules accepted: Orders

## 2018-01-05 NOTE — PROGRESS NOTES
Patient Information     Patient Name MRN Sex Bibiana Agustin [id number] Female 1975      Progress Notes by Refugio Sahni MD at 2017  8:45 AM     Author:  Refugio Sahni MD Service:  (none) Author Type:  Physician     Filed:  2017  9:54 AM Encounter Date:  2017 Status:  Signed     :  Refugio Sahni MD (Physician)            SUBJECTIVE:    Bibiana Lira is a 41 y.o. female who presents for follow up pap smear after a LEEP six months.    HPI  She had a LEEP for severe dysplasia last year and is due for follow up pap. She also notes a lump on her left perineum that she is concerned about. She had multiple questions today regarding safe sex and HPV infection and transmission.    Allergies      Allergen   Reactions     Gluten  Intolerance-Can't Take     Sulfa (Sulfonamide Antibiotics)  Erythema     Sulfa (Sulfonamide Antibiotics)       Verified in [employer]: Y, Severity in [employer]: S      Sulfadiazine  Rash   ,   Current Outpatient Prescriptions on File Prior to Visit       Medication  Sig Dispense Refill     SUMAtriptan (IMITREX) 25 mg tablet Take 0.5-1 tablets by mouth every 2 hours if needed for Migraine. Max dose: 200mg per 24 hrs. 20 tablet 2     No current facility-administered medications on file prior to visit.    ,   Past Medical History:     Diagnosis  Date     Hx of pregnancy     , both vaginal deliveries.  Prolapsed uterus with first delivery.      Irritable bowel syndrome (IBS)     avoids caramel color; gluten free     and   Past Surgical History:      Procedure  Laterality Date     WISDOM TEETH EXTRACTION      [place] teeth extraction         REVIEW OF SYSTEMS:  Review of Systems   All other systems reviewed and are negative.      OBJECTIVE:  BP 90/62  Pulse 72  Temp 98.6  F (37  C) (Tympanic)  Resp 16  Wt 71.3 kg (157 lb 4 oz)  Breastfeeding? No  BMI 26.17 kg/m2    EXAM:   Physical Exam   Genitourinary:   Genitourinary Comments: Cervix is well healed from  her LEEP, a bit friable and bled slightly with her PAP.  Vulva has a small epidermal inclusion cyst on her left perineum.       ASSESSMENT/PLAN:    ICD-10-CM    1. H/O LEEP Z98.890 GYN THIN PREP PAP SCREEN IMAGED      GYN THIN PREP PAP SCREEN IMAGED        Plan:  Will inform her of her pap and follow up recs when it has returned.  Hot pack the epidermal cyst, discussed conservative management.    Refugio Sahni MD FACOG  9:54 AM 5/26/2017

## 2018-01-12 ENCOUNTER — OFFICE VISIT - GICH (OUTPATIENT)
Dept: OBGYN | Facility: OTHER | Age: 43
End: 2018-01-12

## 2018-01-12 ENCOUNTER — HOSPITAL ENCOUNTER (OUTPATIENT)
Dept: RADIOLOGY | Facility: OTHER | Age: 43
End: 2018-01-12
Attending: FAMILY MEDICINE

## 2018-01-12 ENCOUNTER — HISTORY (OUTPATIENT)
Dept: OBGYN | Facility: OTHER | Age: 43
End: 2018-01-12

## 2018-01-12 DIAGNOSIS — Z12.31 ENCOUNTER FOR SCREENING MAMMOGRAM FOR MALIGNANT NEOPLASM OF BREAST: ICD-10-CM

## 2018-01-12 DIAGNOSIS — N87.1 MODERATE CERVICAL DYSPLASIA: ICD-10-CM

## 2018-01-24 ENCOUNTER — DOCUMENTATION ONLY (OUTPATIENT)
Dept: FAMILY MEDICINE | Facility: OTHER | Age: 43
End: 2018-01-24

## 2018-01-24 PROBLEM — M25.371 RIGHT ANKLE INSTABILITY: Status: ACTIVE | Noted: 2017-08-24

## 2018-01-24 PROBLEM — N92.6 MENSTRUAL IRREGULARITY: Status: ACTIVE | Noted: 2017-08-24

## 2018-01-24 RX ORDER — SUMATRIPTAN 25 MG/1
12.5-25 TABLET, FILM COATED ORAL
COMMUNITY
Start: 2016-09-29 | End: 2020-10-03

## 2018-01-25 LAB — HPV RESULTS - HISTORICAL: NEGATIVE

## 2018-01-26 VITALS
HEART RATE: 64 BPM | WEIGHT: 152.2 LBS | TEMPERATURE: 97.9 F | SYSTOLIC BLOOD PRESSURE: 122 MMHG | SYSTOLIC BLOOD PRESSURE: 110 MMHG | OXYGEN SATURATION: 98 % | HEIGHT: 65 IN | DIASTOLIC BLOOD PRESSURE: 78 MMHG | DIASTOLIC BLOOD PRESSURE: 64 MMHG | BODY MASS INDEX: 25.89 KG/M2 | WEIGHT: 155.6 LBS | HEART RATE: 84 BPM | BODY MASS INDEX: 25.36 KG/M2

## 2018-01-26 VITALS
BODY MASS INDEX: 26.42 KG/M2 | DIASTOLIC BLOOD PRESSURE: 74 MMHG | WEIGHT: 153 LBS | HEIGHT: 65 IN | SYSTOLIC BLOOD PRESSURE: 110 MMHG | HEART RATE: 76 BPM | TEMPERATURE: 99.2 F | HEART RATE: 76 BPM | WEIGHT: 158.6 LBS | BODY MASS INDEX: 25.46 KG/M2 | DIASTOLIC BLOOD PRESSURE: 70 MMHG | SYSTOLIC BLOOD PRESSURE: 120 MMHG

## 2018-01-26 VITALS
BODY MASS INDEX: 26.17 KG/M2 | DIASTOLIC BLOOD PRESSURE: 62 MMHG | WEIGHT: 157.25 LBS | HEART RATE: 72 BPM | SYSTOLIC BLOOD PRESSURE: 90 MMHG | RESPIRATION RATE: 16 BRPM | TEMPERATURE: 98.6 F

## 2018-02-09 VITALS
DIASTOLIC BLOOD PRESSURE: 84 MMHG | BODY MASS INDEX: 26.06 KG/M2 | WEIGHT: 156.6 LBS | SYSTOLIC BLOOD PRESSURE: 128 MMHG | HEART RATE: 72 BPM

## 2018-02-12 NOTE — PROGRESS NOTES
Patient Information     Patient Name MRN Sex Bibiana Agustin [id number] Female 1975      Progress Notes by Refugio Sahni MD at 2018  1:15 PM     Author:  Refugio Sahni MD Service:  (none) Author Type:  Physician     Filed:  2018  2:06 PM Encounter Date:  2018 Status:  Signed     :  Refugio Sahni MD (Physician)            SUBJECTIVE:    Bibiana Lira is a 42 y.o. female who presents for follow up pap smear    HPI  She had SAM 2 on colposcopy with subsequent LEEP with pos anterior margin for SAM 1. Last pap six months ago was normal.  She is presenting for her second follow-up Pap smear. No other new concerns today.  Allergies      Allergen   Reactions     Dairy [Lactase]  *Unknown     Had muscle testing done       Gluten  Intolerance-Can't Take     Orange  *Unknown     Had muscle testing- reacted to oranges       Sulfa (Sulfonamide Antibiotics)  Erythema     Sulfa (Sulfonamide Antibiotics)       Verified in [facility]: Y, Severity in [facility]: S      Sulfadiazine  Rash    and   Current Outpatient Prescriptions on File Prior to Visit       Medication  Sig Dispense Refill     SUMAtriptan (IMITREX) 25 mg tablet Take 0.5-1 tablets by mouth every 2 hours if needed for Migraine. Max dose: 200mg per 24 hrs. 20 tablet 2     No current facility-administered medications on file prior to visit.        REVIEW OF SYSTEMS:  Review of Systems   All other systems reviewed and are negative.      OBJECTIVE:  /84 (Cuff Site: Right Arm, Position: Sitting, Cuff Size: Adult Regular)  Pulse 72  Wt 71 kg (156 lb 9.6 oz)  LMP 2018  Breastfeeding? No  BMI 26.06 kg/m2    EXAM:   Physical Exam   Genitourinary: Vagina normal, cervix normal and vulva normal.   Genitourinary Comments: Pap obtained, cervix appears well healed.       ASSESSMENT/PLAN:  No diagnosis found.     Plan:  We'll inform her of Pap results when they're available and plan for follow-up in 1 year if normal. I did share  with her that she could resume normal cervical cancer screening at this time if it is normal however should be more comfortable following up annually.

## 2018-02-12 NOTE — PROGRESS NOTES
Patient Information     Patient Name MRN Sex Bibiana Agustin [id number] Female 1975      Progress Notes by Pat [facility].T. [facility] at 2018  2:04 PM     Author:  Pat Amos R.T. ([facility]RT) Service:  (none) Author Type:  [employer] - Registered Radiologic Technologist     Filed:  2018  2:05 PM Date of Service:  2018  2:04 PM Status:  Signed     :  Pat Amos R.T. (TERESAT) ([employer] - Registered Radiologic Technologist)            Falls Risk Criteria:    Age 65 and older or under age 4        Sensory deficits    Poor vision    Use of ambulatory aides    Impaired judgment    Unable to walk independently    Meets High Risk criteria for falls:  no

## 2018-02-13 NOTE — ADDENDUM NOTE
Patient Information     Patient Name MRN Bibiana Olmos [id number] Female 1975      Addendum Note by Asucnion Flores at 2018  1:13 PM     Author:  Asuncion Flores Service:  (none) Author Type:  (none)     Filed:  2018  1:13 PM Encounter Date:  2018 Status:  Signed     :  Asuncion Flores       Addended by: ASUNCION FLORES on: 2018 01:13 PM        Modules accepted: Orders

## 2018-03-02 ENCOUNTER — ALLIED HEALTH/NURSE VISIT (OUTPATIENT)
Dept: FAMILY MEDICINE | Facility: OTHER | Age: 43
End: 2018-03-02
Attending: FAMILY MEDICINE
Payer: COMMERCIAL

## 2018-03-02 ENCOUNTER — TELEPHONE (OUTPATIENT)
Dept: INTERNAL MEDICINE | Facility: OTHER | Age: 43
End: 2018-03-02

## 2018-03-02 DIAGNOSIS — Z23 NEED FOR PROPHYLACTIC VACCINATION AND INOCULATION AGAINST INFLUENZA: Primary | ICD-10-CM

## 2018-03-02 PROCEDURE — 90471 IMMUNIZATION ADMIN: CPT

## 2018-03-02 PROCEDURE — 99207 ZZC NO CHARGE NURSE ONLY: CPT

## 2018-03-02 PROCEDURE — 96372 THER/PROPH/DIAG INJ SC/IM: CPT

## 2018-03-02 PROCEDURE — 90686 IIV4 VACC NO PRSV 0.5 ML IM: CPT

## 2018-03-02 NOTE — TELEPHONE ENCOUNTER
Patient approached front window requesting vaccination information and results on titer for varicella. Gave MIIC.    No further questions or concerns.    Stanislav Ambrose LPN 03/02/18 11:34 AM

## 2018-03-02 NOTE — PROGRESS NOTES

## 2018-03-02 NOTE — MR AVS SNAPSHOT
"              After Visit Summary   3/2/2018    Bibiana Lira    MRN: [id number]           Patient Information     Date Of Birth          1975        Visit Information        Provider Department      3/2/2018 2:30 PM  INJECTION NURSE [facility]        Today's Diagnoses     Need for prophylactic vaccination and inoculation against influenza    -  1       Follow-ups after your visit        Your next 10 appointments already scheduled     Mar 02, 2018  2:30 PM CST   Nurse Only with  INJECTION NURSE   [facility] ([facility])    [address]  [place] [address] [phone]              Who to contact     If you have questions or need follow up information about today's clinic visit or your schedule please contact [facility] directly at [phone].  Normal or non-critical lab and imaging results will be communicated to you by Gratafyhart, letter or phone within 4 business days after the clinic has received the results. If you do not hear from us within 7 days, please contact the clinic through Gratafyhart or phone. If you have a critical or abnormal lab result, we will notify you by phone as soon as possible.  Submit refill requests through Instapagar or call your pharmacy and they will forward the refill request to us. Please allow 3 business days for your refill to be completed.          Additional Information About Your Visit        MyChart Information     Instapagar lets you send messages to your doctor, view your test results, renew your prescriptions, schedule appointments and more. To sign up, go to [email or web address] . Click on \"Log in\" on the left side of the screen, which will take you to the Welcome page. Then click on \"Sign up Now\" on the right side of the page.     You will be asked to enter the access code listed below, as well as some personal information. Please follow the directions to create your " username and password.     Your access code is: [email or web address]  Expires: 2018  2:29 PM     Your access code will  in 90 days. If you need help or a new code, please call your [place] clinic or [phone].        Care EveryWhere ID     This is your Care EveryWhere ID. This could be used by other organizations to access your [place] medical records  [id number]         Blood Pressure from Last 3 Encounters:   18 128/84   10/16/17 122/64   17 110/70    Weight from Last 3 Encounters:   18 156 lb 9.6 oz (71 kg)   10/16/17 152 lb 3.2 oz (69 kg)   17 153 lb (69.4 kg)              We Performed the Following     FLU VAC, SPLIT VIRUS IM > 3 YO (QUADRIVALENT) [43670]     Vaccine Administration, Initial [05056]        Primary Care Provider Office Phone # Fax #    Margoth NARAYANAN DO [place] [phone] [address] [facility] [address]        Equal Access to Services     [facility]: Hadii aad ku hadasho Soomaali, waaxda luqadaha, qaybta kaalmada janki, david rosales . So [facility] [phone].    ATENCIÓN: Si habla español, tiene a méndez disposición servicios gratuitos de asistencia lingüística. Llame al [phone].    We comply with applicable federal civil rights laws and [place] laws. We do not discriminate on the basis of race, color, national origin, age, disability, sex, sexual orientation, or gender identity.            Thank you!     Thank you for choosing [facility]  for your care. Our goal is always to provide you with excellent care. Hearing back from our patients is one way we can continue to improve our services. Please take a few minutes to complete the written survey that you may receive in the mail after your visit with us. Thank you!             Your Updated Medication List - Protect others around you: Learn how to safely use, store and throw away your medicines at www.disposemymeds.org.           This list is accurate as of 3/2/18  2:29 PM.  Always use your most recent med list.                   Brand Name Dispense Instructions for use Diagnosis    SUMAtriptan 25 MG tablet    IMITREX     Take 12.5-25 mg by mouth every 2 hours as needed for migraine Max dose: 200 mg per 24 hrs

## 2018-03-30 ENCOUNTER — TELEPHONE (OUTPATIENT)
Dept: FAMILY MEDICINE | Facility: OTHER | Age: 43
End: 2018-03-30

## 2018-03-30 DIAGNOSIS — L65.9 HAIR LOSS: ICD-10-CM

## 2018-03-30 DIAGNOSIS — Z13.220 LIPID SCREENING: ICD-10-CM

## 2018-03-30 DIAGNOSIS — R53.83 FATIGUE, UNSPECIFIED TYPE: ICD-10-CM

## 2018-03-30 DIAGNOSIS — E55.9 VITAMIN D DEFICIENCY: Primary | ICD-10-CM

## 2018-03-30 NOTE — TELEPHONE ENCOUNTER
Patient has been taking Vitamin D 10,000 IU daily (she states she does occasionally miss a dose). Complains of fatigue, and hair loss. She is wondering if she is a candidate for Vit D injections (states she heard about them through a friend)? She states she also has been gluten free for 12 years, and has been dairy free for 4-6 months, she isn't sure that would effect her levels or not. Please advise if patient needs to have level rechecked or dose increased?    Marija Yadav LPN.................. 3/30/2018 4:11 PM

## 2018-04-03 NOTE — TELEPHONE ENCOUNTER
Recommended daily dose is Vitamin D3 400-800 IU daily;  If she is taking 10,000 IU daily; it is not likely her vitmain D level causing her symptoms.   Would definitely have level checked, in addition to other lab work up prior to further treatment.  No injections available for Vitamin D.  Margoth Ramachandran

## 2018-04-03 NOTE — TELEPHONE ENCOUNTER
Patient was notified. She is fine with doing the lab. Can you place orders?     She was told we will call back when/if placed. Unless if you want to see her? I did tell her she can make an appt with you as well if she has other concerns, otherwise she will just come in for the labs. Message is ok for tomorrow.   Leslie Barker LPN...................4/3/2018   8:54 AM

## 2018-04-09 DIAGNOSIS — R53.83 FATIGUE, UNSPECIFIED TYPE: ICD-10-CM

## 2018-04-09 DIAGNOSIS — L65.9 LOSS OF HAIR: Primary | ICD-10-CM

## 2018-04-09 DIAGNOSIS — R53.83 FATIGUE: ICD-10-CM

## 2018-04-09 DIAGNOSIS — Z13.220 LIPID SCREENING: ICD-10-CM

## 2018-04-09 DIAGNOSIS — E55.9 VITAMIN D DEFICIENCY: ICD-10-CM

## 2018-04-09 DIAGNOSIS — L65.9 HAIR LOSS: ICD-10-CM

## 2018-04-09 LAB
ALBUMIN SERPL-MCNC: 4.4 G/DL (ref 3.5–5.7)
ALP SERPL-CCNC: 79 U/L (ref 34–104)
ALT SERPL W P-5'-P-CCNC: 16 U/L (ref 7–52)
ANION GAP SERPL CALCULATED.3IONS-SCNC: 7 MMOL/L (ref 3–14)
AST SERPL W P-5'-P-CCNC: 19 U/L (ref 13–39)
BILIRUB SERPL-MCNC: 0.2 MG/DL (ref 0.3–1)
BUN SERPL-MCNC: 11 MG/DL (ref 7–25)
CALCIUM SERPL-MCNC: 9.5 MG/DL (ref 8.6–10.3)
CHLORIDE SERPL-SCNC: 104 MMOL/L (ref 98–107)
CHOLEST SERPL-MCNC: 207 MG/DL
CO2 SERPL-SCNC: 28 MMOL/L (ref 21–31)
CREAT SERPL-MCNC: 0.84 MG/DL (ref 0.6–1.2)
DEPRECATED CALCIDIOL+CALCIFEROL SERPL-MC: 21.6 NG/ML
ERYTHROCYTE [DISTWIDTH] IN BLOOD BY AUTOMATED COUNT: 12 % (ref 10–15)
FERRITIN SERPL-MCNC: 30 NG/ML (ref 23.9–336.2)
GFR SERPL CREATININE-BSD FRML MDRD: 74 ML/MIN/1.7M2
GLUCOSE SERPL-MCNC: 97 MG/DL (ref 70–105)
HCT VFR BLD AUTO: 37 % (ref 35–47)
HDLC SERPL-MCNC: 48 MG/DL (ref 23–92)
HGB BLD-MCNC: 12.7 G/DL (ref 11.7–15.7)
LDLC SERPL CALC-MCNC: 104 MG/DL
MCH RBC QN AUTO: 30.9 PG (ref 26.5–33)
MCHC RBC AUTO-ENTMCNC: 34.3 G/DL (ref 31.5–36.5)
MCV RBC AUTO: 90 FL (ref 78–100)
NONHDLC SERPL-MCNC: 159 MG/DL
PLATELET # BLD AUTO: 283 10E9/L (ref 150–450)
POTASSIUM SERPL-SCNC: 4.2 MMOL/L (ref 3.5–5.1)
PROT SERPL-MCNC: 7.5 G/DL (ref 6.4–8.9)
RBC # BLD AUTO: 4.11 10E12/L (ref 3.8–5.2)
SODIUM SERPL-SCNC: 139 MMOL/L (ref 134–144)
T4 FREE SERPL-MCNC: 0.61 NG/DL (ref 0.6–1.6)
TRIGL SERPL-MCNC: 275 MG/DL
TSH SERPL DL<=0.05 MIU/L-ACNC: 1.24 IU/ML (ref 0.34–5.6)
WBC # BLD AUTO: 8.3 10E9/L (ref 4–11)

## 2018-04-09 PROCEDURE — 86038 ANTINUCLEAR ANTIBODIES: CPT | Performed by: FAMILY MEDICINE

## 2018-04-09 PROCEDURE — 84439 ASSAY OF FREE THYROXINE: CPT | Performed by: FAMILY MEDICINE

## 2018-04-09 PROCEDURE — 85027 COMPLETE CBC AUTOMATED: CPT | Performed by: FAMILY MEDICINE

## 2018-04-09 PROCEDURE — 84443 ASSAY THYROID STIM HORMONE: CPT | Performed by: FAMILY MEDICINE

## 2018-04-09 PROCEDURE — 80053 COMPREHEN METABOLIC PANEL: CPT | Performed by: FAMILY MEDICINE

## 2018-04-09 PROCEDURE — 82728 ASSAY OF FERRITIN: CPT | Performed by: FAMILY MEDICINE

## 2018-04-09 PROCEDURE — 80061 LIPID PANEL: CPT | Performed by: FAMILY MEDICINE

## 2018-04-09 PROCEDURE — 36415 COLL VENOUS BLD VENIPUNCTURE: CPT | Performed by: FAMILY MEDICINE

## 2018-04-09 PROCEDURE — 82306 VITAMIN D 25 HYDROXY: CPT | Performed by: FAMILY MEDICINE

## 2018-04-11 LAB — ANA SER QL IF: NEGATIVE

## 2018-04-12 ENCOUNTER — TELEPHONE (OUTPATIENT)
Dept: FAMILY MEDICINE | Facility: OTHER | Age: 43
End: 2018-04-12

## 2018-04-12 NOTE — TELEPHONE ENCOUNTER
See result note, patient informed of results.  Gauri Tran............................... 4/12/2018 10:55 AM

## 2018-07-23 NOTE — PROGRESS NOTES
Patient Information     Patient Name  Bibiana Lira MRN  [id number] Sex  Female   1975      Letter by Refugio Sahni MD at      Author:  Refugio Sahni MD Service:  (none) Author Type:  (none)    Filed:   Encounter Date:  2017 Status:  (Other)           Bibiana Lira  [address] [address]  [place] [address]          May 31, 2017    Dear Ms. Lira:    The result from the Pap test(s) you had done at your recent clinic visit came back as normal.     We recommend that you have an adult physical exam each year. Depending on your Pap test history, you may or may not need a Pap test at these visits.  You and your health care provider will decide what is right for you.    If you have any further questions or concerns, please call [phone]. You may also contact us by using medical messaging if you have MyChart.    Thank you for choosing [facility] to participate in your healthcare needs.    Sincerely,    Dr Refugio Sahni MD, FACOG  Mary Triana RN        The [facility] Screening Program is a statewide comprehensive breast and cervical cancer screening program that provides free screening and follow-up services (including colposcopy) to uninsured and underinsured women. For more information call toll free [phone] ([phone])

## 2018-07-24 NOTE — PROGRESS NOTES
Patient Information     Patient Name  Bibiana Lira MRN  [id number] Sex  Female   1975      Letter by Mohsen Holbrook MD at      Author:  Mohsen Holbrook MD Service:  (none) Author Type:  (none)    Filed:   Date of Service:   Status:  (Other)       [facility]  [address]  [place] [facility] [place] [address]  [phone]         Bibiana Lira   [address]  [place] [address]      January 15, 2018  Date of Breast Imagin2018  2:17 PM    Dear Ms. Lira:  We are pleased to inform you that the result of your recent breast imaging examination is normal/benign (not cancer). A report of your results was sent to your health care provider(s).    Your mammogram shows that your breast tissue is dense.  Dense breast tissue is relatively common and is found in more than 50 percent of women. Dense breast tissue may be associated with a slight increased risk of breast cancer and may make cancer more difficult to detect by mammogram. However, the actual risk of breast cancer for women with dense breast tissue is still low. This information is given to you to raise your own awareness and help you talk with your primary care provider. Together, you can decide which screening options are right for you.     Your images will become part of your medical file here at [facility] and will be available for your continuing care. You are responsible for informing any new health care provider or breast imaging facility of the date and location of this examination.    Mammography remains the gold standard and is the most accurate method for early detection. Mammograms are the only medical imaging test shown to reduce breast cancer deaths. Not all cancers are found through mammography. If you notice any new changes in your breast(s) please inform your healthcare provider.     Thank you for choosing [facility] to participate in your healthcare needs.     [place]  [facility] Recommendations for Early Breast Cancer Detection   in Women without Symptoms  When to start having mammograms to screen for breast cancer, and how often to have them, is a personal decision. It should be based on your preferences, your values and your risk for developing breast cancer. [facility] recommends that you and your health care provider together determine when mammograms are right for you.    [facility] recommends the following guidelines for women who have an average risk for breast cancer, based on American Cancer Society guidelines:    Age 40 to 44: Mammograms are optional.     Age 45 to 54: Have a mammogram every year.           Age 55 and older: Have a mammogram every year, or transition to having one every 2 years. Continue to have mammograms as long as your health is good.  If you have a higher than average risk for breast cancer, your health care provider may recommend a different schedule.

## 2018-12-13 ENCOUNTER — OFFICE VISIT (OUTPATIENT)
Dept: FAMILY MEDICINE | Facility: OTHER | Age: 43
End: 2018-12-13
Attending: NURSE PRACTITIONER
Payer: COMMERCIAL

## 2018-12-13 VITALS
WEIGHT: 153.25 LBS | BODY MASS INDEX: 24.63 KG/M2 | DIASTOLIC BLOOD PRESSURE: 72 MMHG | HEIGHT: 66 IN | SYSTOLIC BLOOD PRESSURE: 120 MMHG | HEART RATE: 80 BPM

## 2018-12-13 DIAGNOSIS — R53.83 FATIGUE, UNSPECIFIED TYPE: ICD-10-CM

## 2018-12-13 DIAGNOSIS — Z00.00 ROUTINE HISTORY AND PHYSICAL EXAMINATION OF ADULT: Primary | ICD-10-CM

## 2018-12-13 DIAGNOSIS — E55.9 VITAMIN D DEFICIENCY: ICD-10-CM

## 2018-12-13 DIAGNOSIS — Z12.31 ENCOUNTER FOR SCREENING MAMMOGRAM FOR BREAST CANCER: ICD-10-CM

## 2018-12-13 DIAGNOSIS — Z13.220 LIPID SCREENING: ICD-10-CM

## 2018-12-13 PROCEDURE — 99396 PREV VISIT EST AGE 40-64: CPT | Performed by: NURSE PRACTITIONER

## 2018-12-13 PROCEDURE — G0463 HOSPITAL OUTPT CLINIC VISIT: HCPCS

## 2018-12-13 ASSESSMENT — MIFFLIN-ST. JEOR: SCORE: 1353.95

## 2018-12-13 ASSESSMENT — PAIN SCALES - GENERAL: PAINLEVEL: NO PAIN (0)

## 2018-12-13 NOTE — NURSING NOTE
Patient presents to clinic today for a physical. She states she wants to get her vitamin D checked.     No LMP recorded.  Medication Reconciliation: complete    Paula Alvarez LPN  12/13/2018 10:01 AM

## 2018-12-13 NOTE — PATIENT INSTRUCTIONS
Schedule mammogram in January  Schedule appointment for pap smear in January  If insurance issues, recommend [facility] screening  Will call with labs  [facility]

## 2018-12-13 NOTE — PROGRESS NOTES
Nursing Notes:   Paula Alvarez LPN  2018 10:08 AM  Signed  Patient presents to clinic today for a physical. She states she wants to get her vitamin D checked.     No LMP recorded.  Medication Reconciliation: complete    Paula Alvarez LPN  2018 10:01 AM      ANNUAL PHYSICAL - FEMALE    HPI: Bibiana Lira is a 43 year old female who presents for a yearly exam.      Concerns include: Vitamin D, has hx of low Vitamin D. Is very fatigued. She is taking Vitamin D, 4 drops under the tongue. Not sure of dosing.     Cholesterol level-hx of monitoring. Managed with lower fat diet and increased physical therapy.     Patient's last menstrual period was 2018 (lmp unknown).   Risk for STI?: none  Last pap: 2018  Any hx of abnormal paps:  Yes, currently getting annual pap smears  FH of early CA?: none  Cholesterol/DM concerns/screening: last done 6 months ago, requests this again  Tobacco?: none  Calcium intake: dietary  DEXA: due at age 59  Last mammo: 2018  Colonoscopy: due at age 49  Immunizations: last tetanus . Declines influenza currently.     Patient Active Problem List    Diagnosis Date Noted     Menstrual irregularity 2017     Priority: Medium     Overview:   Breakthrough spotting started Feb-2016: off and on.       Right ankle instability 2017     Priority: Medium     Overview:   S/p fracture in  and severe sprain early .       Migraine headache 2009     Priority: Medium     Overview:   Migraine Headache         Past Medical History:   Diagnosis Date     Irritable bowel syndrome without diarrhea     avoids caramel color; gluten free     Personal history of other medical treatment (CODE)     , both vaginal deliveries.  Prolapsed uterus with first delivery.       Past Surgical History:   Procedure Laterality Date     EXTRACTION(S) DENTAL      [place]le[place] teeth extraction     OTHER SURGICAL HISTORY      10/26/2017,[id number],ANKLE SURGERY,ligamentous repair;  Dr. Ren       Family History   Problem Relation Age of Onset     Diabetes Mother         Diabetes     Hypertension Mother         Hypertension     Heart Disease Mother         Heart Disease,pacemaker     Other - See Comments Mother         Stoke x 2     Hyperlipidemia Mother         Hyperlipidemia     Other - See Comments Father         Actinic keratoses     Hypertension Father         Hypertension     Hyperlipidemia Father         Hyperlipidemia     Diabetes Type 2  Father         Diabetes type II     Family History Negative Brother         Good Health     Other - See Comments Sister         Gluten intolerance       Social History     Socioeconomic History     Marital status: Single     Spouse name: Not on file     Number of children: Not on file     Years of education: Not on file     Highest education level: Not on file   Social Needs     Financial resource strain: Not on file     Food insecurity - worry: Not on file     Food insecurity - inability: Not on file     Transportation needs - medical: Not on file     Transportation needs - non-medical: Not on file   Occupational History     Not on file   Tobacco Use     Smoking status: Never Smoker     Smokeless tobacco: Never Used   Substance and Sexual Activity     Alcohol use: Yes     Comment: Alcoholic Drinks/day: rare     Drug use: Unknown     Types: Other     Comment: Drug use: No     Sexual activity: Not Currently     Comment: Birth control method: would like to become sexually active soon with boyfriend   Other Topics Concern     Not on file   Social History Narrative     2010, final in 2011.  Two children.    Living with her parents in [place].    Employed with [employer].    Starting LPN training fall 2013, completed.    In relationship - possibly thinking about marriage in 2016.       Current Outpatient Medications   Medication Sig Dispense Refill     SUMAtriptan (IMITREX) 25 MG tablet Take 12.5-25 mg by mouth every 2 hours as needed for  "migraine Max dose: 200 mg per 24 hrs         Allergies   Allergen Reactions     Gluten Meal Other (See Comments)     Lactase Unknown     Had muscle testing done      [place]ess Oil Unknown     Had muscle testing- reacted to oranges      Sulfa Drugs      Verified in Logisticare: Y, Severity in Meditech: S  Other reaction(s): Erythema     Sulfadiazine Rash       REVIEW OF SYSTEMS:  Review Of Systems  Skin: negative  Eyes: negative, glasses, annual eye exam  Ears/Nose/Throat: negative, annual dental exam  Respiratory: No shortness of breath, dyspnea on exertion, cough, or hemoptysis  Cardiovascular: negative  Gastrointestinal: negative  Genitourinary: negative  Musculoskeletal: joint pain, bilateral index finger pain  Neurologic: migraine headaches, improved with avoiding citrus and dairy. Has aura. Routine chiropractic care. Uses ibuprofen at onset of headaches  Psychiatric: negative  Hematologic/Lymphatic/Immunologic: negative  Endocrine: low vitamin d, fatigue      PHYSICAL EXAM:  /72 (BP Location: Right arm, Patient Position: Sitting, Cuff Size: Adult Regular)   Pulse 80   Ht 1.664 m (5' 5.5\")   Wt 69.5 kg (153 lb 4 oz)   LMP 12/01/2018 (LMP Unknown)   Breastfeeding? No   BMI 25.11 kg/m    CONSTITUTIONAL:  Alert,cooperative, NAD.  EYES: No scleral icterus.  PERRLA.  Conjunctiva clear.  ENT/MOUTH: External ears and nose normal.  TMs normal.  Moist mucous membranes. Oropharynx clear.    ENDO: No thyromegaly or thyroidnodules.  LYMPH:  No cervical or supraclavicular LA.    CARDIOVASCULAR: Regular,S1, S2.  No S3 or S4.  No murmur/gallop/rub.  No peripheral edema.  RESPIRATORY: CTA bilaterally, no wheezes, rhonchi or rales.  GI: Bowel sounds wnl.  Soft, nontender, nondistended.  No masses or HSM.  No rebound orguarding.  MSKEL: Grossly normal ROM.  Noclubbing.  INTEGUMENTARY:  Warm, dry.  No rash noted on exposed skin.  NEUROLOGIC: Facies symmetric.  Grossly normal movement and tone.  No tremor.  PSYCHIATRIC: " Affect normal.  Speech fluent.      PHQ Depression Screen  PHQ-9 SCORE 9/29/2016 11/10/2016   PHQ-9 Total Score 5 3       Labs: Labs are pending, will come in to clinic for fasting labs    ASSESSMENT AND PLAN:    1. Routine history and physical examination of adult    2. Fatigue, unspecified type    3. Vitamin D deficiency    4. Lipid screening    5. Encounter for screening mammogram for breast cancer        Labs pending. Will follow-up with results when available.   Mammogram and pap smear to be scheduled in January 2019.   Encouraged Mediterranean diet, daily exercise.   Follow-up annually.     Relevant cancer screening discussed.    Counseled on healthy diet, Calcium and vitamin D intake, and exercise.    Arielle Ambrose ....................  12/13/2018   10:01 AM

## 2018-12-14 DIAGNOSIS — Z00.00 ROUTINE HISTORY AND PHYSICAL EXAMINATION OF ADULT: ICD-10-CM

## 2018-12-14 DIAGNOSIS — R53.83 FATIGUE, UNSPECIFIED TYPE: ICD-10-CM

## 2018-12-14 DIAGNOSIS — Z13.220 LIPID SCREENING: ICD-10-CM

## 2018-12-14 DIAGNOSIS — E55.9 VITAMIN D DEFICIENCY: ICD-10-CM

## 2018-12-14 LAB
CHOLEST SERPL-MCNC: 201 MG/DL
DEPRECATED CALCIDIOL+CALCIFEROL SERPL-MC: 21.4 NG/ML
ERYTHROCYTE [DISTWIDTH] IN BLOOD BY AUTOMATED COUNT: 11.9 % (ref 10–15)
HCT VFR BLD AUTO: 36.6 % (ref 35–47)
HDLC SERPL-MCNC: 47 MG/DL (ref 23–92)
HGB BLD-MCNC: 12.4 G/DL (ref 11.7–15.7)
LDLC SERPL CALC-MCNC: 123 MG/DL
MCH RBC QN AUTO: 31.1 PG (ref 26.5–33)
MCHC RBC AUTO-ENTMCNC: 33.9 G/DL (ref 31.5–36.5)
MCV RBC AUTO: 92 FL (ref 78–100)
NONHDLC SERPL-MCNC: 154 MG/DL
PLATELET # BLD AUTO: 302 10E9/L (ref 150–450)
RBC # BLD AUTO: 3.99 10E12/L (ref 3.8–5.2)
TRIGL SERPL-MCNC: 156 MG/DL
TSH SERPL DL<=0.05 MIU/L-ACNC: 1.37 IU/ML (ref 0.34–5.6)
WBC # BLD AUTO: 6.1 10E9/L (ref 4–11)

## 2018-12-14 PROCEDURE — 80061 LIPID PANEL: CPT | Performed by: NURSE PRACTITIONER

## 2018-12-14 PROCEDURE — 85027 COMPLETE CBC AUTOMATED: CPT | Performed by: NURSE PRACTITIONER

## 2018-12-14 PROCEDURE — 84443 ASSAY THYROID STIM HORMONE: CPT | Performed by: NURSE PRACTITIONER

## 2018-12-14 PROCEDURE — 82306 VITAMIN D 25 HYDROXY: CPT | Performed by: NURSE PRACTITIONER

## 2018-12-14 PROCEDURE — 36415 COLL VENOUS BLD VENIPUNCTURE: CPT | Performed by: NURSE PRACTITIONER

## 2019-01-22 ENCOUNTER — OFFICE VISIT (OUTPATIENT)
Dept: FAMILY MEDICINE | Facility: OTHER | Age: 44
End: 2019-01-22
Attending: FAMILY MEDICINE
Payer: COMMERCIAL

## 2019-01-22 VITALS
WEIGHT: 158 LBS | DIASTOLIC BLOOD PRESSURE: 56 MMHG | HEART RATE: 80 BPM | BODY MASS INDEX: 25.89 KG/M2 | SYSTOLIC BLOOD PRESSURE: 104 MMHG

## 2019-01-22 DIAGNOSIS — Z87.42 HISTORY OF ABNORMAL CERVICAL PAP SMEAR: ICD-10-CM

## 2019-01-22 DIAGNOSIS — Z12.4 CERVICAL CANCER SCREENING: Primary | ICD-10-CM

## 2019-01-22 PROCEDURE — G0123 SCREEN CERV/VAG THIN LAYER: HCPCS | Performed by: FAMILY MEDICINE

## 2019-01-22 PROCEDURE — 87624 HPV HI-RISK TYP POOLED RSLT: CPT | Performed by: FAMILY MEDICINE

## 2019-01-22 PROCEDURE — 40001026 ZZHCL STATISTICAL PAP TEST QC: Performed by: FAMILY MEDICINE

## 2019-01-22 PROCEDURE — G0463 HOSPITAL OUTPT CLINIC VISIT: HCPCS

## 2019-01-22 PROCEDURE — 99213 OFFICE O/P EST LOW 20 MIN: CPT | Performed by: FAMILY MEDICINE

## 2019-01-22 PROCEDURE — 88142 CYTOPATH C/V THIN LAYER: CPT | Performed by: FAMILY MEDICINE

## 2019-01-22 NOTE — PROGRESS NOTES
Nursing Notes:   Gauri Gutierrez LPN  2019  9:25 AM  Signed  Patient here for PAP smear.  Medication Reconciliation: complete    Gauri Gutierrez LPN    SUBJECTIVE:   Bibiana Lira is a 43 year old female who presents to clinic today for the following health issues:    HPI  Bibiana is here for follow up pap smear.  She has a history of ASCUS with + HPV in 2016; and CIN2 seen on subsequent colposcopy.  She then underwent a LEEP procedure and has had normal testing in , .  She is due today for repeat co-testing.  She has not been sexually active in the last couple years.  She denies any discharge, odor, etc.    Patient Active Problem List    Diagnosis Date Noted     Menstrual irregularity 2017     Priority: Medium     Overview:   Breakthrough spotting started Feb-2016: off and on.       Right ankle instability 2017     Priority: Medium     Overview:   S/p fracture in  and severe sprain early .       Migraine headache 2009     Priority: Medium     Overview:   Migraine Headache       Past Medical History:   Diagnosis Date     Irritable bowel syndrome without diarrhea     avoids caramel color; gluten free     Personal history of other medical treatment (CODE)     , both vaginal deliveries.  Prolapsed uterus with first delivery.      Past Surgical History:   Procedure Laterality Date     EXTRACTION(S) DENTAL      Pauline teeth extraction     OTHER SURGICAL HISTORY      10/26/2017,[id number],ANKLE SURGERY,ligamentous repair; Dr. Ren     Family History   Problem Relation Age of Onset     Diabetes Mother         Diabetes     Hypertension Mother         Hypertension     Heart Disease Mother         Heart Disease,pacemaker     Other - See Comments Mother         Stoke x 2     Hyperlipidemia Mother         Hyperlipidemia     Other - See Comments Father         Actinic keratoses     Hypertension Father         Hypertension     Hyperlipidemia Father         Hyperlipidemia      Diabetes Type 2  Father         Diabetes type II     Family History Negative Brother         Good Health     Other - See Comments Sister         Gluten intolerance     Social History     Tobacco Use     Smoking status: Never Smoker     Smokeless tobacco: Never Used   Substance Use Topics     Alcohol use: Yes     Comment: Alcoholic Drinks/day: rare     Social History     Social History Narrative     2010, final in 2011.  Two children.    Living with her parents in [place].    Employed with [employer].    Starting LPN training fall 2013, completed.    In relationship - possibly thinking about marriage in 2016.     Current Outpatient Medications   Medication Sig Dispense Refill     SUMAtriptan (IMITREX) 25 MG tablet Take 12.5-25 mg by mouth every 2 hours as needed for migraine Max dose: 200 mg per 24 hrs       Allergies   Allergen Reactions     Gluten Meal Other (See Comments)     Lactase Unknown     Had muscle testing done      [place] Oil Unknown     Had muscle testing- reacted to oranges      Sulfa Drugs      Verified in Meditech: Y, Severity in Meditech: S  Other reaction(s): Erythema     Sulfadiazine Rash     Review of Systems   All other systems reviewed and are negative.     OBJECTIVE:     /56 (BP Location: Right arm, Patient Position: Sitting, Cuff Size: Adult Regular)   Pulse 80   Wt 71.7 kg (158 lb)   BMI 25.89 kg/m    Body mass index is 25.89 kg/m .  Physical Exam   Constitutional: She appears well-developed and well-nourished.   HENT:   Head: Normocephalic and atraumatic.   Abdominal: Hernia confirmed negative in the right inguinal area and confirmed negative in the left inguinal area.   Genitourinary: Vagina normal and uterus normal. Pelvic exam was performed with patient prone. There is no rash, tenderness or lesion on the right labia. There is no rash, tenderness or lesion on the left labia. Uterus is not deviated, not enlarged, not fixed and not tender. Cervix exhibits no  motion tenderness, no discharge and no friability. Right adnexum displays no mass, no tenderness and no fullness. Left adnexum displays no mass, no tenderness and no fullness.   Genitourinary Comments: Small amount of bleeding noted from os after collection of pap.  Bright red.  Slight loss of architecture related to LEEP - shallow fornices.   Lymphadenopathy: No inguinal adenopathy noted on the right or left side.   Nursing note and vitals reviewed.    Diagnostic Test Results:  Pap smear and HPV testing pending    ASSESSMENT/PLAN:     1. History of abnormal cervical Pap smear  Reassurance given and indications for testing discussed.  She is anxious and prefers to complete yearly testing (for now) - with reasoning being that she knows we are just studying HPV and learning more about it every year.  - Pap Screen Thin Prep with HPV - recommended age 30 - 65 years (select HPV order below)  - HPV High Risk Types DNA Cervical    2. Cervical cancer screening  See above.  Will notify patient of results when available.  - Pap Screen Thin Prep with HPV - recommended age 30 - 65 years (select HPV order below)  - HPV High Risk Types DNA Cervical      Margoth Ramachandran, [facility] AND [facility]

## 2019-01-28 ENCOUNTER — HOSPITAL ENCOUNTER (OUTPATIENT)
Dept: MAMMOGRAPHY | Facility: OTHER | Age: 44
Discharge: HOME | End: 2019-01-28
Attending: NURSE PRACTITIONER | Admitting: NURSE PRACTITIONER
Payer: COMMERCIAL

## 2019-01-28 DIAGNOSIS — Z12.31 ENCOUNTER FOR SCREENING MAMMOGRAM FOR BREAST CANCER: ICD-10-CM

## 2019-01-28 LAB
COPATH REPORT: NORMAL
FINAL DIAGNOSIS: NORMAL
HPV HR 12 DNA CVX QL NAA+PROBE: NEGATIVE
HPV16 DNA SPEC QL NAA+PROBE: NEGATIVE
HPV18 DNA SPEC QL NAA+PROBE: NEGATIVE
PAP: NORMAL
SPECIMEN DESCRIPTION: NORMAL
SPECIMEN SOURCE CVX/VAG CYTO: NORMAL

## 2019-01-28 PROCEDURE — 77063 BREAST TOMOSYNTHESIS BI: CPT

## 2019-06-28 ENCOUNTER — OFFICE VISIT (OUTPATIENT)
Dept: FAMILY MEDICINE | Facility: OTHER | Age: 44
End: 2019-06-28
Attending: NURSE PRACTITIONER
Payer: COMMERCIAL

## 2019-06-28 VITALS
WEIGHT: 157.25 LBS | TEMPERATURE: 99 F | DIASTOLIC BLOOD PRESSURE: 70 MMHG | RESPIRATION RATE: 15 BRPM | HEIGHT: 66 IN | SYSTOLIC BLOOD PRESSURE: 120 MMHG | HEART RATE: 64 BPM | BODY MASS INDEX: 25.27 KG/M2

## 2019-06-28 DIAGNOSIS — H91.93 DECREASED HEARING OF BOTH EARS: Primary | ICD-10-CM

## 2019-06-28 DIAGNOSIS — L91.8 SKIN TAG: ICD-10-CM

## 2019-06-28 DIAGNOSIS — R11.0 NAUSEA: ICD-10-CM

## 2019-06-28 PROCEDURE — G0463 HOSPITAL OUTPT CLINIC VISIT: HCPCS

## 2019-06-28 PROCEDURE — 99213 OFFICE O/P EST LOW 20 MIN: CPT | Performed by: NURSE PRACTITIONER

## 2019-06-28 ASSESSMENT — PAIN SCALES - GENERAL: PAINLEVEL: NO PAIN (0)

## 2019-06-28 ASSESSMENT — MIFFLIN-ST. JEOR: SCORE: 1372.09

## 2019-06-28 NOTE — PROGRESS NOTES
HPI:    Bibiana Lira is a 43 year old female who presents to clinic today for multiple concerns.     Last night she had a sudden onset of nausea after she had dinner.  She leaned forward thinking she was going to have an emesis and had a large amount of saliva that she was able to spit out but did not vomit.  Shortly after this she had some heaviness in her chest and neck.  Was very intense for about 5 to 7 minutes and then started to resolve.  She laid down and rested and then slept in this morning.  When she woke up today her symptoms had resolved.    She has had some concerns regarding decreased hearing.  She feels like she has to concentrate more to hear what people are saying.  This is significant even when she is on the phone.  She does not have any recurrent ear infections or history of surgery.  Her dad is have hearing loss.  She denies any recent exposure to loud noises.    She is concerned regarding a lesion on her labia.  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  She is worried that the HPV is causing lesions on her labia.  She noticed this in the past week.  Is not itchy, painful or having any drainage.  Is not shaved.  No changes in size.    Past Medical History:   Diagnosis Date     Irritable bowel syndrome without diarrhea     avoids caramel color; gluten free     Personal history of other medical treatment (CODE)     , both vaginal deliveries.  Prolapsed uterus with first delivery.         Current Outpatient Medications   Medication Sig Dispense Refill     SUMAtriptan (IMITREX) 25 MG tablet Take 12.5-25 mg by mouth every 2 hours as needed for migraine Max dose: 200 mg per 24 hrs         Allergies   Allergen Reactions     Gluten Meal Other (See Comments)     Lactase Unknown     Had muscle testing done      [place] Oil Unknown     Had muscle testing- reacted to oranges      Sulfa Drugs      Verified in Meditech: Y, Severity in Meditech: S  Other reaction(s): Erythema      "Sulfadiazine Rash       ROS:  Pertinent positives and negatives are noted in HPI.    EXAM:  /70 (BP Location: Right arm, Patient Position: Sitting, Cuff Size: Adult Regular)   Pulse 64   Temp 99  F (37.2  C) (Tympanic)   Resp 15   Ht 1.664 m (5' 5.5\")   Wt 71.3 kg (157 lb 4 oz)   LMP  (LMP Unknown)   Breastfeeding? No   BMI 25.77 kg/m    General appearance: well appearing female, in no acute distress  Head: normocephalic, atraumatic  Ears: TM's with cone of light, no erythema, canals clear bilaterally  Eyes: conjunctivae normal  Orophayrnx: moist mucous membranes, tonsils without erythema, exudates or petechiae, no post nasal drip seen  Neck: supple without adenopathy  Respiratory: clear to auscultation bilaterally  Cardiac: RRR with no murmurs  Abdomen: soft, nontender, no masses or organomegally  Dermatological: Small skin tag noted to left labia  Psychological: normal affect, alert and pleasant      ASSESSMENT AND PLAN:    1. Decreased hearing of both ears    2. Skin tag    3. Nausea    1.  Referral to audiology for her concerns regarding decreased hearing.  2.  Skin tag appreciated to left labia.  This does not appear to be any kind of herpes, genital warts or STDs.  She is not currently sexually active.  She will monitor symptoms and follow-up as needed.  3.  Sudden onset of nausea that has since resolved.  Reassurance was provided and will follow-up as needed.        Arielle Ambrose..................6/28/2019 1:19 PM      This document was prepared using voice generated software.  While every attempt was made for accuracy, grammatical errors may exist.  "

## 2019-06-28 NOTE — NURSING NOTE
Patient presents to clinic today for multiple concerns.     No LMP recorded.  Medication Reconciliation: complete    Paula Alvarez LPN  6/28/2019 1:20 PM

## 2019-07-02 ENCOUNTER — TELEPHONE (OUTPATIENT)
Dept: FAMILY MEDICINE | Facility: OTHER | Age: 44
End: 2019-07-02

## 2019-07-02 DIAGNOSIS — H91.93 DECREASED HEARING OF BOTH EARS: Primary | ICD-10-CM

## 2019-07-02 NOTE — TELEPHONE ENCOUNTER
AFR stating referral for audiology was used and sent to [facility], however they don't take her insurance. They need new referral placed to send to .     Paula Alvarez LPN...................7/2/2019  11:20 AM

## 2019-07-25 ENCOUNTER — TRANSFERRED RECORDS (OUTPATIENT)
Dept: HEALTH INFORMATION MANAGEMENT | Facility: OTHER | Age: 44
End: 2019-07-25

## 2019-08-06 PROBLEM — H90.3 BILATERAL SENSORINEURAL HEARING LOSS: Status: ACTIVE | Noted: 2019-08-06

## 2019-12-16 ENCOUNTER — HOSPITAL ENCOUNTER (OUTPATIENT)
Dept: GENERAL RADIOLOGY | Facility: OTHER | Age: 44
Discharge: HOME | End: 2019-12-16
Attending: PHYSICIAN ASSISTANT | Admitting: PHYSICIAN ASSISTANT
Payer: COMMERCIAL

## 2019-12-16 ENCOUNTER — OFFICE VISIT (OUTPATIENT)
Dept: FAMILY MEDICINE | Facility: OTHER | Age: 44
End: 2019-12-16
Attending: PHYSICIAN ASSISTANT
Payer: COMMERCIAL

## 2019-12-16 VITALS
SYSTOLIC BLOOD PRESSURE: 112 MMHG | BODY MASS INDEX: 25.56 KG/M2 | DIASTOLIC BLOOD PRESSURE: 66 MMHG | RESPIRATION RATE: 16 BRPM | TEMPERATURE: 97.7 F | WEIGHT: 156 LBS | HEART RATE: 72 BPM

## 2019-12-16 DIAGNOSIS — R10.84 ABDOMINAL PAIN, GENERALIZED: ICD-10-CM

## 2019-12-16 DIAGNOSIS — R10.84 ABDOMINAL PAIN, GENERALIZED: Primary | ICD-10-CM

## 2019-12-16 LAB
ALBUMIN SERPL-MCNC: 4.7 G/DL (ref 3.5–5.7)
ALP SERPL-CCNC: 72 U/L (ref 34–104)
ALT SERPL W P-5'-P-CCNC: 11 U/L (ref 7–52)
AMYLASE SERPL-CCNC: 20 U/L (ref 29–103)
ANION GAP SERPL CALCULATED.3IONS-SCNC: 6 MMOL/L (ref 3–14)
AST SERPL W P-5'-P-CCNC: 14 U/L (ref 13–39)
BASOPHILS # BLD AUTO: 0.1 10E9/L (ref 0–0.2)
BASOPHILS NFR BLD AUTO: 0.7 %
BILIRUB SERPL-MCNC: 0.4 MG/DL (ref 0.3–1)
BUN SERPL-MCNC: 13 MG/DL (ref 7–25)
CALCIUM SERPL-MCNC: 9.6 MG/DL (ref 8.6–10.3)
CHLORIDE SERPL-SCNC: 102 MMOL/L (ref 98–107)
CO2 SERPL-SCNC: 29 MMOL/L (ref 21–31)
CREAT SERPL-MCNC: 0.8 MG/DL (ref 0.6–1.2)
DIFFERENTIAL METHOD BLD: NORMAL
EOSINOPHIL # BLD AUTO: 0.1 10E9/L (ref 0–0.7)
EOSINOPHIL NFR BLD AUTO: 0.8 %
ERYTHROCYTE [DISTWIDTH] IN BLOOD BY AUTOMATED COUNT: 11.8 % (ref 10–15)
GFR SERPL CREATININE-BSD FRML MDRD: 78 ML/MIN/{1.73_M2}
GLUCOSE SERPL-MCNC: 100 MG/DL (ref 70–105)
HCT VFR BLD AUTO: 40 % (ref 35–47)
HGB BLD-MCNC: 13.6 G/DL (ref 11.7–15.7)
IMM GRANULOCYTES # BLD: 0 10E9/L (ref 0–0.4)
IMM GRANULOCYTES NFR BLD: 0.3 %
LIPASE SERPL-CCNC: 18 U/L (ref 11–82)
LYMPHOCYTES # BLD AUTO: 2.4 10E9/L (ref 0.8–5.3)
LYMPHOCYTES NFR BLD AUTO: 31.8 %
MCH RBC QN AUTO: 31.4 PG (ref 26.5–33)
MCHC RBC AUTO-ENTMCNC: 34 G/DL (ref 31.5–36.5)
MCV RBC AUTO: 92 FL (ref 78–100)
MONOCYTES # BLD AUTO: 0.6 10E9/L (ref 0–1.3)
MONOCYTES NFR BLD AUTO: 8.3 %
NEUTROPHILS # BLD AUTO: 4.4 10E9/L (ref 1.6–8.3)
NEUTROPHILS NFR BLD AUTO: 58.1 %
PLATELET # BLD AUTO: 304 10E9/L (ref 150–450)
POTASSIUM SERPL-SCNC: 3.8 MMOL/L (ref 3.5–5.1)
PROT SERPL-MCNC: 7.5 G/DL (ref 6.4–8.9)
RBC # BLD AUTO: 4.33 10E12/L (ref 3.8–5.2)
SODIUM SERPL-SCNC: 137 MMOL/L (ref 134–144)
WBC # BLD AUTO: 7.5 10E9/L (ref 4–11)

## 2019-12-16 PROCEDURE — 82150 ASSAY OF AMYLASE: CPT | Mod: ZL | Performed by: PHYSICIAN ASSISTANT

## 2019-12-16 PROCEDURE — 85025 COMPLETE CBC W/AUTO DIFF WBC: CPT | Mod: ZL | Performed by: PHYSICIAN ASSISTANT

## 2019-12-16 PROCEDURE — G0463 HOSPITAL OUTPT CLINIC VISIT: HCPCS

## 2019-12-16 PROCEDURE — 74019 RADEX ABDOMEN 2 VIEWS: CPT

## 2019-12-16 PROCEDURE — 80053 COMPREHEN METABOLIC PANEL: CPT | Mod: ZL | Performed by: PHYSICIAN ASSISTANT

## 2019-12-16 PROCEDURE — 83690 ASSAY OF LIPASE: CPT | Mod: ZL | Performed by: PHYSICIAN ASSISTANT

## 2019-12-16 PROCEDURE — 99214 OFFICE O/P EST MOD 30 MIN: CPT | Performed by: PHYSICIAN ASSISTANT

## 2019-12-16 PROCEDURE — 36415 COLL VENOUS BLD VENIPUNCTURE: CPT | Mod: ZL | Performed by: PHYSICIAN ASSISTANT

## 2019-12-16 ASSESSMENT — ANXIETY QUESTIONNAIRES
3. WORRYING TOO MUCH ABOUT DIFFERENT THINGS: NOT AT ALL
GAD7 TOTAL SCORE: 0
2. NOT BEING ABLE TO STOP OR CONTROL WORRYING: NOT AT ALL
7. FEELING AFRAID AS IF SOMETHING AWFUL MIGHT HAPPEN: NOT AT ALL
5. BEING SO RESTLESS THAT IT IS HARD TO SIT STILL: NOT AT ALL
1. FEELING NERVOUS, ANXIOUS, OR ON EDGE: NOT AT ALL
6. BECOMING EASILY ANNOYED OR IRRITABLE: NOT AT ALL
IF YOU CHECKED OFF ANY PROBLEMS ON THIS QUESTIONNAIRE, HOW DIFFICULT HAVE THESE PROBLEMS MADE IT FOR YOU TO DO YOUR WORK, TAKE CARE OF THINGS AT HOME, OR GET ALONG WITH OTHER PEOPLE: NOT DIFFICULT AT ALL

## 2019-12-16 ASSESSMENT — PATIENT HEALTH QUESTIONNAIRE - PHQ9
5. POOR APPETITE OR OVEREATING: NOT AT ALL
SUM OF ALL RESPONSES TO PHQ QUESTIONS 1-9: 0

## 2019-12-16 ASSESSMENT — PAIN SCALES - GENERAL: PAINLEVEL: NO PAIN (0)

## 2019-12-16 NOTE — NURSING NOTE
"Patient presents to the clinic for changes in bowel movements and sour stomach on and off over the past several weeks.      Chief Complaint   Patient presents with     Gastrointestinal Problem       Initial /66 (BP Location: Left arm, Patient Position: Sitting, Cuff Size: Adult Regular)   Pulse 72   Temp 97.7  F (36.5  C) (Tympanic)   Resp 16   Wt 70.8 kg (156 lb)   BMI 25.56 kg/m   Estimated body mass index is 25.56 kg/m  as calculated from the following:    Height as of 6/28/19: 1.664 m (5' 5.5\").    Weight as of this encounter: 70.8 kg (156 lb).  Medication Reconciliation: complete    Brittany Guerrero LPN    "

## 2019-12-16 NOTE — PROGRESS NOTES
"Nursing Notes:   Brittany Guerrero LPN  2019 11:37 AM  Signed  Patient presents to the clinic for changes in bowel movements and sour stomach on and off over the past several weeks.      Chief Complaint   Patient presents with     Gastrointestinal Problem       Initial /66 (BP Location: Left arm, Patient Position: Sitting, Cuff Size: Adult Regular)   Pulse 72   Temp 97.7  F (36.5  C) (Tympanic)   Resp 16   Wt 70.8 kg (156 lb)   BMI 25.56 kg/m    Estimated body mass index is 25.56 kg/m  as calculated from the following:    Height as of 19: 1.664 m (5' 5.5\").    Weight as of this encounter: 70.8 kg (156 lb).  Medication Reconciliation: complete    Brittany Guerrero LPN      HPI:    Bibiana Lira is a 44 year old female who presents for abdominal concerns. She has a history of gluten and dairy sensitivity. The day before Thanksgi she had a flare of stomach pain and cramping x 4 days. Was also nauseous and had a headache. It never fully resolved and has not improved. Now she has a \"sour stomach\". Gets rolling cramps/uncomfortable feelings spread out over he abdomen. Has been watching what she eats because sometimes eating makes it worse, other times it makes it better. Lost a few pounds as she is not eating much. Stools are soft and small. Having trouble emptying bowel completely. Is straining to have bowel movements. Takes Tums as needed with some relief. Also using essential oils. Has cut back on coffee. She reports having 2 episodes in October where she passed something long and white . Not moving. No fevers, chills, vomiting.  No cough or cold symptoms.  Little sinus congestion which is typical for her.     Past Medical History:   Diagnosis Date     Irritable bowel syndrome without diarrhea     avoids caramel color; gluten free     Personal history of other medical treatment (CODE)     , both vaginal deliveries.  Prolapsed uterus with first delivery.       Past Surgical History: " "  Procedure Laterality Date     EXTRACTION(S) DENTAL      [place] teeth extraction     OTHER SURGICAL HISTORY      10/26/2017,[id number],ANKLE SURGERY,ligamentous repair; Dr. Ren       Family History   Problem Relation Age of Onset     Diabetes Mother         Diabetes     Hypertension Mother         Hypertension     Heart Disease Mother         Heart Disease,pacemaker     Other - See Comments Mother         Stoke x 2     Hyperlipidemia Mother         Hyperlipidemia     Other - See Comments Father         Actinic keratoses     Hypertension Father         Hypertension     Hyperlipidemia Father         Hyperlipidemia     Diabetes Type 2  Father         Diabetes type II     Family History Negative Brother         Good Health     Other - See Comments Sister         Gluten intolerance       Social History     Tobacco Use     Smoking status: Never Smoker     Smokeless tobacco: Never Used   Substance Use Topics     Alcohol use: Yes     Comment:  rare       Current Outpatient Medications   Medication Sig Dispense Refill     SUMAtriptan (IMITREX) 25 MG tablet Take 12.5-25 mg by mouth every 2 hours as needed for migraine Max dose: 200 mg per 24 hrs       bisacodyl (DULCOLAX) 5 MG EC tablet Take two tablets at noon day prior to colonoscopy 2 tablet 0     polyethylene glycol-electrolytes (NULYTELY WITH FLAVOR PACKS) 420 g solution Take 250 mL every 10 minutes the day prior to colonoscopy 4000 mL 0       Allergies   Allergen Reactions     Gluten Meal GI Disturbance     \"flu like symptoms\"     Lactase GI Disturbance     AND fatigue     Orange Oil Headache     migraine     Sulfa Drugs      Verified in Meditech: Y, Severity in Meditech: S  Other reaction(s): Erythema     Sulfadiazine Rash       REVIEW OF SYSTEMS:  Refer to HPI.    EXAM:   Vitals:    /66 (BP Location: Left arm, Patient Position: Sitting, Cuff Size: Adult Regular)   Pulse 72   Temp 97.7  F (36.5  C) (Tympanic)   Resp 16   Wt 70.8 kg (156 lb)   LMP " 11/19/2019 (Approximate)   BMI 25.56 kg/m      General Appearance: Pleasant, alert, appropriate appearance for age. No acute distress  Chest/Respiratory Exam: Normal chest wall and respirations. Clear to auscultation.  Cardiovascular Exam: Regular rate and rhythm. S1, S2, no murmur, click, gallop, or rubs.  Gastrointestinal Exam: Soft, no masses or organomegaly. Normal BS x 4.  Mildly tender to palpation RLQ and LLQ, otherwise non-tender.  No rebound tenderness or guarding appreciated.  No CVA tenderness to palpation.  Skin: no rash or abnormalities  Psychiatric Exam: Alert and oriented - appropriate affect.    PHQ Depression Screen  PHQ-9 SCORE 9/29/2016 11/10/2016 12/16/2019   PHQ-9 Total Score 5 3 0       Labs:  Results for orders placed or performed in visit on 12/16/19   CBC and Differential     Status: None   Result Value Ref Range    WBC 7.5 4.0 - 11.0 10e9/L    RBC Count 4.33 3.8 - 5.2 10e12/L    Hemoglobin 13.6 11.7 - 15.7 g/dL    Hematocrit 40.0 35.0 - 47.0 %    MCV 92 78 - 100 fl    MCH 31.4 26.5 - 33.0 pg    MCHC 34.0 31.5 - 36.5 g/dL    RDW 11.8 10.0 - 15.0 %    Platelet Count 304 150 - 450 10e9/L    Diff Method Automated Method     % Neutrophils 58.1 %    % Lymphocytes 31.8 %    % Monocytes 8.3 %    % Eosinophils 0.8 %    % Basophils 0.7 %    % Immature Granulocytes 0.3 %    Absolute Neutrophil 4.4 1.6 - 8.3 10e9/L    Absolute Lymphocytes 2.4 0.8 - 5.3 10e9/L    Absolute Monocytes 0.6 0.0 - 1.3 10e9/L    Absolute Eosinophils 0.1 0.0 - 0.7 10e9/L    Absolute Basophils 0.1 0.0 - 0.2 10e9/L    Abs Immature Granulocytes 0.0 0 - 0.4 10e9/L   Comprehensive Metabolic Panel     Status: None   Result Value Ref Range    Sodium 137 134 - 144 mmol/L    Potassium 3.8 3.5 - 5.1 mmol/L    Chloride 102 98 - 107 mmol/L    Carbon Dioxide 29 21 - 31 mmol/L    Anion Gap 6 3 - 14 mmol/L    Glucose 100 70 - 105 mg/dL    Urea Nitrogen 13 7 - 25 mg/dL    Creatinine 0.80 0.60 - 1.20 mg/dL    GFR Estimate 78 >60  mL/min/[1.73_m2]    GFR Estimate If Black >90 >60 mL/min/[1.73_m2]    Calcium 9.6 8.6 - 10.3 mg/dL    Bilirubin Total 0.4 0.3 - 1.0 mg/dL    Albumin 4.7 3.5 - 5.7 g/dL    Protein Total 7.5 6.4 - 8.9 g/dL    Alkaline Phosphatase 72 34 - 104 U/L    ALT 11 7 - 52 U/L    AST 14 13 - 39 U/L   Amylase     Status: Abnormal   Result Value Ref Range    Amylase 20 (L) 29 - 103 U/L   Lipase     Status: None   Result Value Ref Range    Lipase 18 11 - 82 U/L       ASSESSMENT AND PLAN:      ICD-10-CM    1. Abdominal pain, generalized R10.84 CBC and Differential     Comprehensive Metabolic Panel     Amylase     Lipase     XR Abdomen 2 Views     Stool culture     Clostridium difficile Toxin B PCR     H Pylori antigen stool     Giardia antigen     Ova and Parasite Exam Routine     GENERAL SURG ADULT REFERRAL     CBC and Differential     Comprehensive Metabolic Panel     Amylase     Lipase       Completed abdominal xray.  I personally reviewed the xray. I found no concerns appreciated upon initial read of xray.  Final read pending by radiology.    Abdominal Pain:  Patient had lab work completed today-CBC, CMP, amylase and lipase, no concerns. She was sent home with a stool kit, will return sample at soonest convenience. Her abdominal x-ray was unremarkable.  She was encouraged to drink plenty of fluids. Will eat a bland diet over the next week to avoid irritating the stomach. May take Tylenol or Ibuprofen for pain relief. May take a stool softener daily. Follow up as needed. Return to clinic sooner if new or worsening symptoms.   Patient was referred to general surgery for consult.  Gave warning signs and symptoms.    Greater than 25 minutes were spent in counseling and coordination of care.     Patient Instructions   Try small amounts of food and drink frequently. Offer a bland diet. Advance as tolerated. Avoid dairy products the first day. You may add yogurt tomorrow as the first dairy product.    Try the BRAT diet (bread, bananas,  rice, applesauce, tea, toast).  This is a very bland diet which should not irritate your colon.  Hold off on spicy foods, red sauces, mexican or chinese food.    Call or return to clinic as needed if your symptoms worsen or fail to improve as anticipated.     If the pain does not begin improving, localizes to the right lower belly, there is increased fever, or other progression of symptoms, return for reassessment.    Should I see a doctor or nurse about my stomach ache? -- Most people do not need to see a doctor or nurse for a stomach ache. But you should see your doctor or nurse if:  ?You have bloody bowel movements, diarrhea, or vomiting  ?Your pain is severe and lasts more than an hour or comes and goes for more than 24 hours  ?You cannot eat or drink for hours  ?You have a fever higher than 102 F (39 C)  ?You lose a lot of weight without trying to, or lose interest in food          Cherie Doe PA-C PA-C..................12/16/2019 11:37 AM

## 2019-12-16 NOTE — LETTER
December 16, 2019      Bibiana Lira  [address] [address] [id number]        Dear ,    We are writing to inform you of your test results.    Your white blood cell count, hemoglobin, electrolytes, kidney function, liver function and pancreatic function are normal.    Resulted Orders   CBC and Differential   Result Value Ref Range    WBC 7.5 4.0 - 11.0 10e9/L    RBC Count 4.33 3.8 - 5.2 10e12/L    Hemoglobin 13.6 11.7 - 15.7 g/dL    Hematocrit 40.0 35.0 - 47.0 %    MCV 92 78 - 100 fl    MCH 31.4 26.5 - 33.0 pg    MCHC 34.0 31.5 - 36.5 g/dL    RDW 11.8 10.0 - 15.0 %    Platelet Count 304 150 - 450 10e9/L    Diff Method Automated Method     % Neutrophils 58.1 %    % Lymphocytes 31.8 %    % Monocytes 8.3 %    % Eosinophils 0.8 %    % Basophils 0.7 %    % Immature Granulocytes 0.3 %    Absolute Neutrophil 4.4 1.6 - 8.3 10e9/L    Absolute Lymphocytes 2.4 0.8 - 5.3 10e9/L    Absolute Monocytes 0.6 0.0 - 1.3 10e9/L    Absolute Eosinophils 0.1 0.0 - 0.7 10e9/L    Absolute Basophils 0.1 0.0 - 0.2 10e9/L    Abs Immature Granulocytes 0.0 0 - 0.4 10e9/L   Comprehensive Metabolic Panel   Result Value Ref Range    Sodium 137 134 - 144 mmol/L    Potassium 3.8 3.5 - 5.1 mmol/L    Chloride 102 98 - 107 mmol/L    Carbon Dioxide 29 21 - 31 mmol/L    Anion Gap 6 3 - 14 mmol/L    Glucose 100 70 - 105 mg/dL    Urea Nitrogen 13 7 - 25 mg/dL    Creatinine 0.80 0.60 - 1.20 mg/dL    GFR Estimate 78 >60 mL/min/[1.73_m2]    GFR Estimate If Black >90 >60 mL/min/[1.73_m2]    Calcium 9.6 8.6 - 10.3 mg/dL    Bilirubin Total 0.4 0.3 - 1.0 mg/dL    Albumin 4.7 3.5 - 5.7 g/dL    Protein Total 7.5 6.4 - 8.9 g/dL    Alkaline Phosphatase 72 34 - 104 U/L    ALT 11 7 - 52 U/L    AST 14 13 - 39 U/L   Amylase   Result Value Ref Range    Amylase 20 (L) 29 - 103 U/L   Lipase   Result Value Ref Range    Lipase 18 11 - 82 U/L       If you have any questions or concerns, please call the clinic at the number listed above.        Sincerely,        Cherie Doe PA-C

## 2019-12-17 DIAGNOSIS — R10.84 ABDOMINAL PAIN, GENERALIZED: ICD-10-CM

## 2019-12-17 LAB
H PYLORI AG STL QL IA: NORMAL
SPECIMEN SOURCE: NORMAL

## 2019-12-17 PROCEDURE — 87209 SMEAR COMPLEX STAIN: CPT | Mod: ZL | Performed by: PHYSICIAN ASSISTANT

## 2019-12-17 PROCEDURE — 87338 HPYLORI STOOL AG IA: CPT | Mod: ZL | Performed by: PHYSICIAN ASSISTANT

## 2019-12-17 PROCEDURE — 87177 OVA AND PARASITES SMEARS: CPT | Mod: ZL | Performed by: PHYSICIAN ASSISTANT

## 2019-12-17 ASSESSMENT — ANXIETY QUESTIONNAIRES: GAD7 TOTAL SCORE: 0

## 2019-12-18 ENCOUNTER — OFFICE VISIT (OUTPATIENT)
Dept: SURGERY | Facility: OTHER | Age: 44
End: 2019-12-18
Attending: PHYSICIAN ASSISTANT
Payer: COMMERCIAL

## 2019-12-18 ENCOUNTER — TELEPHONE (OUTPATIENT)
Dept: SURGERY | Facility: OTHER | Age: 44
End: 2019-12-18

## 2019-12-18 VITALS
TEMPERATURE: 98.5 F | WEIGHT: 155.8 LBS | DIASTOLIC BLOOD PRESSURE: 78 MMHG | SYSTOLIC BLOOD PRESSURE: 108 MMHG | HEART RATE: 76 BPM | RESPIRATION RATE: 16 BRPM | BODY MASS INDEX: 25.53 KG/M2

## 2019-12-18 DIAGNOSIS — R10.84 ABDOMINAL PAIN, GENERALIZED: Primary | ICD-10-CM

## 2019-12-18 LAB
HCG UR QL: NEGATIVE
O+P STL MICRO: NORMAL
O+P STL MICRO: NORMAL
SPECIMEN SOURCE: NORMAL

## 2019-12-18 PROCEDURE — 81025 URINE PREGNANCY TEST: CPT | Mod: ZL | Performed by: SURGERY

## 2019-12-18 PROCEDURE — G0463 HOSPITAL OUTPT CLINIC VISIT: HCPCS

## 2019-12-18 PROCEDURE — 99213 OFFICE O/P EST LOW 20 MIN: CPT | Performed by: SURGERY

## 2019-12-18 RX ORDER — BISACODYL 5 MG/1
TABLET, DELAYED RELEASE ORAL
Qty: 2 TABLET | Refills: 0 | Status: ON HOLD | OUTPATIENT
Start: 2019-12-18 | End: 2019-12-23

## 2019-12-18 RX ORDER — POLYETHYLENE GLYCOL 3350, SODIUM CHLORIDE, SODIUM BICARBONATE, POTASSIUM CHLORIDE 420; 11.2; 5.72; 1.48 G/4L; G/4L; G/4L; G/4L
POWDER, FOR SOLUTION ORAL
Qty: 4000 ML | Refills: 0 | Status: ON HOLD | OUTPATIENT
Start: 2019-12-18 | End: 2019-12-23

## 2019-12-18 ASSESSMENT — PAIN SCALES - GENERAL: PAINLEVEL: MILD PAIN (3)

## 2019-12-18 NOTE — TELEPHONE ENCOUNTER
Screening Questions for the Scheduling of Screening Colonoscopies   (If Colonoscopy is diagnostic, Provider should review the chart before scheduling.)  Are you younger than 50 or older than 80?  YES   Do you take aspirin or fish oil?  NO  (if yes, tell patient to stop 1 week prior to Colonoscopy)  Do you take warfarin (Coumadin), clopidogrel (Plavix), apixaban (Eliquis), dabigatram (Pradaxa), rivaroxaban (Xarelto) or any blood thinner? NO   Do you use oxygen at home?  NO   Do you have kidney disease? NO   Are you on dialysis? NO   Have you had a stroke or heart attack in the last year? NO   Have you had a stent in your heart or any blood vessel in the last year? NO   Have you had a transplant of any organ? NO   Have you had a colonoscopy or upper endoscopy (EGD) before? NO          When?    Date of scheduled Colonoscopy. 12/23/2019  Provider DAO [facility]

## 2019-12-18 NOTE — NURSING NOTE
"Chief Complaint   Patient presents with     Consult     abdominal pain       Initial /78 (BP Location: Right arm, Patient Position: Sitting, Cuff Size: Adult Large)   Pulse 76   Temp 98.5  F (36.9  C) (Tympanic)   Resp 16   Wt 70.7 kg (155 lb 12.8 oz)   LMP 11/19/2019 (Approximate)   Breastfeeding No   BMI 25.53 kg/m   Estimated body mass index is 25.53 kg/m  as calculated from the following:    Height as of 6/28/19: 1.664 m (5' 5.5\").    Weight as of this encounter: 70.7 kg (155 lb 12.8 oz).  Medication Reconciliation: complete    Cadence Ashton LPN  "

## 2019-12-18 NOTE — PROGRESS NOTES
"Surgical Clinic Consult  Referring physician:  Brook  Primary physician:     Margoth Ramachandran    Chief complaint:   Abdominal pain    History of present illness:  This is a 44 year old female I am seeing in consultation for abdominal pain.  The patient has had diffuse crampy abdominal pain since just before .  She is noticed some constipation gas bloating and intermittent abdominal pain.  She quit gluten and dairy 12 years ago.  There is a family history of colon cancer in her grandmother at age 74.  She had a flat and upright abdominal x-ray that is unremarkable.  CBC comp panel amylase and H. pylori are negative.  No prior abdominal surgery.    Past medical history:   Past Medical History:   Diagnosis Date     Irritable bowel syndrome without diarrhea     avoids caramel color; gluten free     Personal history of other medical treatment (CODE)     , both vaginal deliveries.  Prolapsed uterus with first delivery.       Pastsurgical history:  Past Surgical History:   Procedure Laterality Date     EXTRACTION(S) DENTAL      [place] teeth extraction     OTHER SURGICAL HISTORY      10/26/2017,[id number],ANKLE SURGERY,ligamentous repair; Dr. Ren       Current medications:  Current Outpatient Medications   Medication Sig Dispense Refill     bisacodyl (DULCOLAX) 5 MG EC tablet Take two tablets at noon day prior to colonoscopy 2 tablet 0     polyethylene glycol-electrolytes (NULYTELY WITH FLAVOR PACKS) 420 g solution Take 250 mL every 10 minutes the day prior to colonoscopy 4000 mL 0     SUMAtriptan (IMITREX) 25 MG tablet Take 12.5-25 mg by mouth every 2 hours as needed for migraine Max dose: 200 mg per 24 hrs         Allergies:  Allergies   Allergen Reactions     Gluten Meal GI Disturbance     \"flu like symptoms\"     Lactase GI Disturbance     AND fatigue     Orange Oil Headache     migraine     Sulfa Drugs      Verified in Meditech: Y, Severity in Meditech: S  Other reaction(s): Erythema     Sulfadiazine " Rash       Family history:  Family History   Problem Relation Age of Onset     Diabetes Mother         Diabetes     Hypertension Mother         Hypertension     Heart Disease Mother         Heart Disease,pacemaker     Other - See Comments Mother         Stoke x 2     Hyperlipidemia Mother         Hyperlipidemia     Other - See Comments Father         Actinic keratoses     Hypertension Father         Hypertension     Hyperlipidemia Father         Hyperlipidemia     Diabetes Type 2  Father         Diabetes type II     Family History Negative Brother         Good Health     Other - See Comments Sister         Gluten intolerance       Social history:  Social History     Socioeconomic History     Marital status: Single     Spouse name: Not on file     Number of children: Not on file     Years of education: Not on file     Highest education level: Not on file   Occupational History     Not on file   Social Needs     Financial resource strain: Not on file     Food insecurity:     Worry: Not on file     Inability: Not on file     Transportation needs:     Medical: Not on file     Non-medical: Not on file   Tobacco Use     Smoking status: Never Smoker     Smokeless tobacco: Never Used   Substance and Sexual Activity     Alcohol use: Yes     Comment:  rare     Drug use: No     Sexual activity: Not Currently     Comment: Birth control method: would like to become sexually active soon with boyfriend   Lifestyle     Physical activity:     Days per week: Not on file     Minutes per session: Not on file     Stress: Not on file   Relationships     Social connections:     Talks on phone: Not on file     Gets together: Not on file     Attends Congregation service: Not on file     Active member of club or organization: Not on file     Attends meetings of clubs or organizations: Not on file     Relationship status: Not on file     Intimate partner violence:     Fear of current or ex partner: Not on file     Emotionally abused: Not on file      Physically abused: Not on file     Forced sexual activity: Not on file   Other Topics Concern     Not on file   Social History Narrative     2010, final in 2011.  Two children.    Living with her parents in [place].    Employed with [employer].    Starting LPN training fall 2013, completed.    In relationship - possibly thinking about marriage in 2016.       PROBLEM LIST:  Patient Active Problem List   Diagnosis     Menstrual irregularity     Migraine headache     Right ankle instability     Bilateral sensorineural hearing loss     Abdominal pain, generalized     Review of systems:  COMPLETE REVIEW OF SYSTEMS: Decreased appetite and weight loss of a few pounds  Gastrointestinal: See HPI   Cardiovascular: Chest pain and palpitations  Respiratory: Denies problems  Genitourinary: Denies problems  Musculoskeletal: Joint and muscle pain   Skin:denies problems  Neurologic: Headaches  Psychiatric: Anxiety  Endocrine: Cold and heat intolerance  Heme/Lymphatic: Denies problems  Vascular: Cold hands and feet      Physical exam: /78 (BP Location: Right arm, Patient Position: Sitting, Cuff Size: Adult Large)   Pulse 76   Temp 98.5  F (36.9  C) (Tympanic)   Resp 16   Wt 70.7 kg (155 lb 12.8 oz)   LMP 11/19/2019 (Approximate)   Breastfeeding No   BMI 25.53 kg/m        General: this is a pleasant female patient in no acute distress.  Patient is awake alert and oriented x3 .   Respiratory: Clear without wheezes or crackles  Cardiovascular: Regular rate and rhythm without murmurs  Abdominal: No surgical scars.  Soft and nontender.  No bulges in either inguinal area.    Assessment:   Nonlocalized midabdominal pain of uncertain etiology.     Plan:    1.  Ultrasound of the gallbladder  2.  Urine hCG  3.  EGD/colonoscopy as a day surgery.  Risks of bleeding, perforation, incomplete examination aspiration discussed and wishes to proceed.      Oli Jackson MD FACS

## 2019-12-18 NOTE — H&P (VIEW-ONLY)
"Surgical Clinic Consult  Referring physician:  Brook  Primary physician:     Margoth Ramachandran    Chief complaint:   Abdominal pain    History of present illness:  This is a 44 year old female I am seeing in consultation for abdominal pain.  The patient has had diffuse crampy abdominal pain since just before .  She is noticed some constipation gas bloating and intermittent abdominal pain.  She quit gluten and dairy 12 years ago.  There is a family history of colon cancer in her grandmother at age 74.  She had a flat and upright abdominal x-ray that is unremarkable.  CBC comp panel amylase and H. pylori are negative.  No prior abdominal surgery.    Past medical history:   Past Medical History:   Diagnosis Date     Irritable bowel syndrome without diarrhea     avoids caramel color; gluten free     Personal history of other medical treatment (CODE)     , both vaginal deliveries.  Prolapsed uterus with first delivery.       Pastsurgical history:  Past Surgical History:   Procedure Laterality Date     EXTRACTION(S) DENTAL      [place] teeth extraction     OTHER SURGICAL HISTORY      10/26/2017,[id number],ANKLE SURGERY,ligamentous repair; Dr. Ren       Current medications:  Current Outpatient Medications   Medication Sig Dispense Refill     bisacodyl (DULCOLAX) 5 MG EC tablet Take two tablets at noon day prior to colonoscopy 2 tablet 0     polyethylene glycol-electrolytes (NULYTELY WITH FLAVOR PACKS) 420 g solution Take 250 mL every 10 minutes the day prior to colonoscopy 4000 mL 0     SUMAtriptan (IMITREX) 25 MG tablet Take 12.5-25 mg by mouth every 2 hours as needed for migraine Max dose: 200 mg per 24 hrs         Allergies:  Allergies   Allergen Reactions     Gluten Meal GI Disturbance     \"flu like symptoms\"     Lactase GI Disturbance     AND fatigue     Orange Oil Headache     migraine     Sulfa Drugs      Verified in Meditech: Y, Severity in Meditech: S  Other reaction(s): Erythema     Sulfadiazine " Rash       Family history:  Family History   Problem Relation Age of Onset     Diabetes Mother         Diabetes     Hypertension Mother         Hypertension     Heart Disease Mother         Heart Disease,pacemaker     Other - See Comments Mother         Stoke x 2     Hyperlipidemia Mother         Hyperlipidemia     Other - See Comments Father         Actinic keratoses     Hypertension Father         Hypertension     Hyperlipidemia Father         Hyperlipidemia     Diabetes Type 2  Father         Diabetes type II     Family History Negative Brother         Good Health     Other - See Comments Sister         Gluten intolerance       Social history:  Social History     Socioeconomic History     Marital status: Single     Spouse name: Not on file     Number of children: Not on file     Years of education: Not on file     Highest education level: Not on file   Occupational History     Not on file   Social Needs     Financial resource strain: Not on file     Food insecurity:     Worry: Not on file     Inability: Not on file     Transportation needs:     Medical: Not on file     Non-medical: Not on file   Tobacco Use     Smoking status: Never Smoker     Smokeless tobacco: Never Used   Substance and Sexual Activity     Alcohol use: Yes     Comment:  rare     Drug use: No     Sexual activity: Not Currently     Comment: Birth control method: would like to become sexually active soon with boyfriend   Lifestyle     Physical activity:     Days per week: Not on file     Minutes per session: Not on file     Stress: Not on file   Relationships     Social connections:     Talks on phone: Not on file     Gets together: Not on file     Attends Bahai service: Not on file     Active member of club or organization: Not on file     Attends meetings of clubs or organizations: Not on file     Relationship status: Not on file     Intimate partner violence:     Fear of current or ex partner: Not on file     Emotionally abused: Not on file      Physically abused: Not on file     Forced sexual activity: Not on file   Other Topics Concern     Not on file   Social History Narrative     2010, final in 2011.  Two children.    Living with her parents in [place].    Employed with [employer].    Starting LPN training fall 2013, completed.    In relationship - possibly thinking about marriage in 2016.       PROBLEM LIST:  Patient Active Problem List   Diagnosis     Menstrual irregularity     Migraine headache     Right ankle instability     Bilateral sensorineural hearing loss     Abdominal pain, generalized     Review of systems:  COMPLETE REVIEW OF SYSTEMS: Decreased appetite and weight loss of a few pounds  Gastrointestinal: See HPI   Cardiovascular: Chest pain and palpitations  Respiratory: Denies problems  Genitourinary: Denies problems  Musculoskeletal: Joint and muscle pain   Skin:denies problems  Neurologic: Headaches  Psychiatric: Anxiety  Endocrine: Cold and heat intolerance  Heme/Lymphatic: Denies problems  Vascular: Cold hands and feet      Physical exam: /78 (BP Location: Right arm, Patient Position: Sitting, Cuff Size: Adult Large)   Pulse 76   Temp 98.5  F (36.9  C) (Tympanic)   Resp 16   Wt 70.7 kg (155 lb 12.8 oz)   LMP 11/19/2019 (Approximate)   Breastfeeding No   BMI 25.53 kg/m        General: this is a pleasant female patient in no acute distress.  Patient is awake alert and oriented x3 .   Respiratory: Clear without wheezes or crackles  Cardiovascular: Regular rate and rhythm without murmurs  Abdominal: No surgical scars.  Soft and nontender.  No bulges in either inguinal area.    Assessment:   Nonlocalized midabdominal pain of uncertain etiology.     Plan:    1.  Ultrasound of the gallbladder  2.  Urine hCG  3.  EGD/colonoscopy as a day surgery.  Risks of bleeding, perforation, incomplete examination aspiration discussed and wishes to proceed.      Oli Jackson MD FACS

## 2019-12-23 ENCOUNTER — ANESTHESIA EVENT (OUTPATIENT)
Dept: SURGERY | Facility: OTHER | Age: 44
End: 2019-12-23
Payer: COMMERCIAL

## 2019-12-23 ENCOUNTER — HOSPITAL ENCOUNTER (OUTPATIENT)
Facility: OTHER | Age: 44
Discharge: HOME | End: 2019-12-23
Attending: SURGERY | Admitting: SURGERY
Payer: COMMERCIAL

## 2019-12-23 ENCOUNTER — ANESTHESIA (OUTPATIENT)
Dept: SURGERY | Facility: OTHER | Age: 44
End: 2019-12-23
Payer: COMMERCIAL

## 2019-12-23 VITALS — RESPIRATION RATE: 18 BRPM | TEMPERATURE: 99 F | SYSTOLIC BLOOD PRESSURE: 128 MMHG | DIASTOLIC BLOOD PRESSURE: 66 MMHG

## 2019-12-23 DIAGNOSIS — K25.3 ACUTE PYLORIC CHANNEL ULCER: Primary | ICD-10-CM

## 2019-12-23 PROCEDURE — 88305 TISSUE EXAM BY PATHOLOGIST: CPT

## 2019-12-23 PROCEDURE — 43239 EGD BIOPSY SINGLE/MULTIPLE: CPT | Performed by: SURGERY

## 2019-12-23 PROCEDURE — 25000125 ZZHC RX 250: Performed by: NURSE ANESTHETIST, CERTIFIED REGISTERED

## 2019-12-23 PROCEDURE — 25000132 ZZH RX MED GY IP 250 OP 250 PS 637: Performed by: SURGERY

## 2019-12-23 PROCEDURE — 45380 COLONOSCOPY AND BIOPSY: CPT | Performed by: SURGERY

## 2019-12-23 PROCEDURE — 25000125 ZZHC RX 250: Performed by: SURGERY

## 2019-12-23 PROCEDURE — 25800030 ZZH RX IP 258 OP 636: Performed by: NURSE ANESTHETIST, CERTIFIED REGISTERED

## 2019-12-23 PROCEDURE — 25000128 H RX IP 250 OP 636: Performed by: NURSE ANESTHETIST, CERTIFIED REGISTERED

## 2019-12-23 PROCEDURE — 43239 EGD BIOPSY SINGLE/MULTIPLE: CPT | Performed by: NURSE ANESTHETIST, CERTIFIED REGISTERED

## 2019-12-23 PROCEDURE — 40000010 ZZH STATISTIC ANES STAT CODE-CRNA PER MINUTE: Performed by: SURGERY

## 2019-12-23 RX ORDER — SIMETHICONE 40MG/0.6ML
SUSPENSION, DROPS(FINAL DOSAGE FORM)(ML) ORAL PRN
Status: DISCONTINUED | OUTPATIENT
Start: 2019-12-23 | End: 2019-12-23 | Stop reason: HOSPADM

## 2019-12-23 RX ORDER — PROPOFOL 10 MG/ML
INJECTION, EMULSION INTRAVENOUS PRN
Status: DISCONTINUED | OUTPATIENT
Start: 2019-12-23 | End: 2019-12-23

## 2019-12-23 RX ORDER — NALOXONE HYDROCHLORIDE 0.4 MG/ML
.1-.4 INJECTION, SOLUTION INTRAMUSCULAR; INTRAVENOUS; SUBCUTANEOUS
Status: DISCONTINUED | OUTPATIENT
Start: 2019-12-23 | End: 2019-12-23 | Stop reason: HOSPADM

## 2019-12-23 RX ORDER — SODIUM CHLORIDE, SODIUM LACTATE, POTASSIUM CHLORIDE, CALCIUM CHLORIDE 600; 310; 30; 20 MG/100ML; MG/100ML; MG/100ML; MG/100ML
INJECTION, SOLUTION INTRAVENOUS CONTINUOUS PRN
Status: DISCONTINUED | OUTPATIENT
Start: 2019-12-23 | End: 2019-12-23

## 2019-12-23 RX ORDER — SODIUM CHLORIDE, SODIUM LACTATE, POTASSIUM CHLORIDE, CALCIUM CHLORIDE 600; 310; 30; 20 MG/100ML; MG/100ML; MG/100ML; MG/100ML
INJECTION, SOLUTION INTRAVENOUS CONTINUOUS
Status: DISCONTINUED | OUTPATIENT
Start: 2019-12-23 | End: 2019-12-23 | Stop reason: HOSPADM

## 2019-12-23 RX ORDER — LIDOCAINE HYDROCHLORIDE 20 MG/ML
INJECTION, SOLUTION INFILTRATION; PERINEURAL PRN
Status: DISCONTINUED | OUTPATIENT
Start: 2019-12-23 | End: 2019-12-23

## 2019-12-23 RX ORDER — FLUMAZENIL 0.1 MG/ML
0.2 INJECTION, SOLUTION INTRAVENOUS
Status: DISCONTINUED | OUTPATIENT
Start: 2019-12-23 | End: 2019-12-23 | Stop reason: HOSPADM

## 2019-12-23 RX ORDER — OMEPRAZOLE 40 MG/1
40 CAPSULE, DELAYED RELEASE ORAL DAILY
Qty: 30 CAPSULE | Refills: 1 | Status: SHIPPED | OUTPATIENT
Start: 2019-12-23 | End: 2020-10-03

## 2019-12-23 RX ORDER — ONDANSETRON 2 MG/ML
4 INJECTION INTRAMUSCULAR; INTRAVENOUS
Status: DISCONTINUED | OUTPATIENT
Start: 2019-12-23 | End: 2019-12-23 | Stop reason: HOSPADM

## 2019-12-23 RX ORDER — LIDOCAINE 40 MG/G
CREAM TOPICAL
Status: DISCONTINUED | OUTPATIENT
Start: 2019-12-23 | End: 2019-12-23 | Stop reason: HOSPADM

## 2019-12-23 RX ORDER — PROPOFOL 10 MG/ML
INJECTION, EMULSION INTRAVENOUS CONTINUOUS PRN
Status: DISCONTINUED | OUTPATIENT
Start: 2019-12-23 | End: 2019-12-23

## 2019-12-23 RX ADMIN — LIDOCAINE HYDROCHLORIDE 80 MG: 20 INJECTION, SOLUTION INFILTRATION; PERINEURAL at 14:02

## 2019-12-23 RX ADMIN — SODIUM CHLORIDE, POTASSIUM CHLORIDE, SODIUM LACTATE AND CALCIUM CHLORIDE: 600; 310; 30; 20 INJECTION, SOLUTION INTRAVENOUS at 13:48

## 2019-12-23 RX ADMIN — PROPOFOL 140 MCG/KG/MIN: 10 INJECTION, EMULSION INTRAVENOUS at 14:03

## 2019-12-23 RX ADMIN — PROPOFOL 100 MG: 10 INJECTION, EMULSION INTRAVENOUS at 14:02

## 2019-12-23 NOTE — ANESTHESIA POSTPROCEDURE EVALUATION
Patient: Bibiana Lira    Procedure(s):  COLONOSCOPY, WITH POLYPECTOMY AND BIOPSY  ESOPHAGOGASTRODUODENOSCOPY, WITH BIOPSY    Diagnosis:Abdominal pain [R10.9]  Diagnosis Additional Information: No value filed.    Anesthesia Type:  MAC    Note:  Anesthesia Post Evaluation    Patient location during evaluation: Phase 2  Patient participation: Able to fully participate in evaluation  Level of consciousness: awake and alert  Pain management: adequate  Airway patency: patent  Cardiovascular status: acceptable  Respiratory status: acceptable  Hydration status: acceptable  PONV: none     Anesthetic complications: None          Last vitals:  Vitals:    12/23/19 1316   BP: 128/66   Resp: 16   Temp: 99  F (37.2  C)         Electronically Signed By: DEONTE ABERNATHY CRNA  December 23, 2019  2:40 PM

## 2019-12-23 NOTE — INTERVAL H&P NOTE
The history and physical has been reviewed and the patient has been examined.  There are no interim changes to the patient's history or physical condition.    Oli Jackson MD

## 2019-12-23 NOTE — DISCHARGE INSTRUCTIONS
[facility]  Adult Discharge Orders & Instructions    ________________________________________________________________          For 12 hours after surgery  1. Get plenty of rest.  A responsible adult must stay with you for at least 12 hours after you leave the hospital.   2. You may feel lightheaded.  IF so, sit for a few minutes before standing.  Have someone help you get up.   3. You may have a slight fever. Call the doctor if your fever is over 101 F (38.3 C) (taken under the tongue) or lasts longer than 24 hours.  4. You may have a dry mouth, a sore throat, muscle aches or trouble sleeping.  These should go away after 24 hours.  5. Do not make important or legal decisions.  6.   Do not drive or use heavy equipment.  If you have weakness or tingling, don't drive or use heavy equipment until this feeling goes away.    To contact a doctor, call   [phone]_______________________

## 2019-12-23 NOTE — OR NURSING
[facility] [place], [place] DISCHARGE NOTE    Patient discharged to home at 3:55 PM via ambulation. Accompanied by father and staff. Discharge instructions reviewed with patient and dad, opportunity offered to ask questions. Prescriptions - None ordered for discharge. All belongings sent with patient.  No further questions or concerns.  Instructed to  new medication at [facility]     Rosa Maria Gil RN

## 2019-12-23 NOTE — OP NOTE
PROCEDURE NOTE    DATE OF SERVICE: 12/23/2019    SURGEON: lOi Jackson MD    PRE-OP DIAGNOSIS:    Abdominal pain      POST-OP DIAGNOSIS:  Same  Pyloric channel ulcer  Normal colon exam    PROCEDURE:   EGD/Colonoscopy with random biopsies      ANESTHESIA:  MELA Johnston CRNA    INDICATION FOR THE PROCEDURE: The patient is a 44 year old female with abdominal pain . The patient has no other complaints  . After explaining the risks to include bleeding, perforation, potential inability toreach the cecum, the patient wished to proceed.    PROCEDURE:After adequate sedation, the patient was in the left lateral decubitus position.     The esophagoscope was passed under direct vision into the esophagus and onto the second portion of the duodenum.  The duodenum was unremarkable and biopsied.  The antrum was with a pyloric channel ulcer with granulation into antrum, photo taken.  Biopsies were taken from the antrum to look for occult H. Pylori.  The scope was retroflexed.  The GE junction and fundus were unremarkable .  Scope was straightened and pulled back.  The distal esophagus was with sharp z-line .  Biopsies were taken from the distal esophagus.  The remaining esophagus was unremarkable . The scope was removed.     Rectal exam was performed.  There was normal tone and no palpable masses .  The colonoscope was introduced into the rectum and advanced to the cecum with Moderate difficulty.  The patient's prep was excellent.  The terminal cecum was reached.  The TI, cecum, ascending, transverse, descending and sigmoid colon was unremarkable. Random biopsies taken from TI, right, left and rectum .  The scope was retroflexed in the rectum.  The rectum was unremarkable  .  The scope was straightened and removed.  The patient tolerated the procedure well.     ESTIMATED BLOOD LOSS: none    COMPLICATIONS:  None    TISSUE REMOVED:  Yes    RECOMMEND:    PPI for 6-8 weeks  Follow-up colonoscopy in 10 years  Follow-up pending  pathology      Oli Jackson MD FACS

## 2019-12-23 NOTE — ANESTHESIA CARE TRANSFER NOTE
Patient: Bibiana Lira    Procedure(s):  COLONOSCOPY, WITH POLYPECTOMY AND BIOPSY  ESOPHAGOGASTRODUODENOSCOPY, WITH BIOPSY    Diagnosis: Abdominal pain [R10.9]  Diagnosis Additional Information: No value filed.    Anesthesia Type:   MAC     Note:  Airway :Nasal Cannula  Patient transferred to:Phase II  Handoff Report: Identifed the Patient, Identified the Reponsible Provider, Reviewed the pertinent medical history, Discussed the surgical course, Reviewed Intra-OP anesthesia mangement and issues during anesthesia, Set expectations for post-procedure period and Allowed opportunity for questions and acknowledgement of understanding      Vitals: (Last set prior to Anesthesia Care Transfer)    CRNA VITALS  12/23/2019 1405 - 12/23/2019 1440      12/23/2019             Resp Rate (set):  10                Electronically Signed By: DEONTE ABERNATHY CRNA  December 23, 2019  2:40 PM

## 2019-12-23 NOTE — ANESTHESIA PREPROCEDURE EVALUATION
Anesthesia Pre-Procedure Evaluation    Patient: Bibiana Lira   MRN: [id number] : 1975          Preoperative Diagnosis: Abdominal pain [R10.9]    Procedure(s):  COLONOSCOPY  ESOPHAGOGASTRODUODENOSCOPY (EGD)    Past Medical History:   Diagnosis Date     Irritable bowel syndrome without diarrhea     avoids caramel color; gluten free     Personal history of other medical treatment (CODE)     , both vaginal deliveries.  Prolapsed uterus with first delivery.     Past Surgical History:   Procedure Laterality Date     EXTRACTION(S) DENTAL      [place] teeth extraction     OTHER SURGICAL HISTORY      10/26/2017,[id number],ANKLE SURGERY,ligamentous repair; Dr. Ren       Anesthesia Evaluation     . Pt has had prior anesthetic.     No history of anesthetic complications          ROS/MED HX    ENT/Pulmonary:  - neg pulmonary ROS     Neurologic:     (+)migraines,     Cardiovascular:  - neg cardiovascular ROS       METS/Exercise Tolerance:  >4 METS   Hematologic:  - neg hematologic  ROS       Musculoskeletal:  - neg musculoskeletal ROS       GI/Hepatic:     (+) GERD       Renal/Genitourinary:  - ROS Renal section negative       Endo:  - neg endo ROS       Psychiatric:  - neg psychiatric ROS       Infectious Disease:  - neg infectious disease ROS       Malignancy:      - no malignancy   Other:    - neg other ROS                      Physical Exam  Normal systems: cardiovascular, pulmonary and dental    Airway   Mallampati: II  TM distance: >3 FB  Neck ROM: full    Dental     Cardiovascular   Rhythm and rate: regular and normal      Pulmonary             Lab Results   Component Value Date    WBC 7.5 2019    HGB 13.6 2019    HCT 40.0 2019     2019     2019    POTASSIUM 3.8 2019    CHLORIDE 102 2019    CO2 29 2019    BUN 13 2019    CR 0.80 2019     2019    ARVIND 9.6 2019    ALBUMIN 4.7 2019    PROTTOTAL 7.5 2019     "ALT 11 12/16/2019    AST 14 12/16/2019    ALKPHOS 72 12/16/2019    BILITOTAL 0.4 12/16/2019    LIPASE 18 12/16/2019    AMYLASE 20 (L) 12/16/2019    T4 0.61 04/09/2018    HCG Negative 12/18/2019       Preop Vitals  BP Readings from Last 3 Encounters:   12/23/19 128/66   12/18/19 108/78   12/16/19 112/66    Pulse Readings from Last 3 Encounters:   12/18/19 76   12/16/19 72   06/28/19 64      Resp Readings from Last 3 Encounters:   12/23/19 16   12/18/19 16   12/16/19 16    SpO2 Readings from Last 3 Encounters:   10/16/17 98%      Temp Readings from Last 1 Encounters:   12/23/19 99  F (37.2  C) (Tympanic)    Ht Readings from Last 1 Encounters:   06/28/19 1.664 m (5' 5.5\")      Wt Readings from Last 1 Encounters:   12/18/19 70.7 kg (155 lb 12.8 oz)    Estimated body mass index is 25.53 kg/m  as calculated from the following:    Height as of 6/28/19: 1.664 m (5' 5.5\").    Weight as of 12/18/19: 70.7 kg (155 lb 12.8 oz).       Anesthesia Plan      History & Physical Review      ASA Status:  2 .    NPO Status:  > 8 hours    Plan for MAC with Intravenous induction. Maintenance will be Balanced.      Risks, benefits and alternatives discussed and would like to proceed. General anesthesia ok if indicated.         Postoperative Care      Consents  Anesthetic plan, risks, benefits and alternatives discussed with:  Patient.  Use of blood products discussed: No .   .                 DEONTE Martinez CRNA  "

## 2019-12-26 ENCOUNTER — HOSPITAL ENCOUNTER (OUTPATIENT)
Dept: ULTRASOUND IMAGING | Facility: OTHER | Age: 44
Discharge: HOME | End: 2019-12-26
Attending: SURGERY | Admitting: SURGERY
Payer: COMMERCIAL

## 2019-12-26 DIAGNOSIS — R10.84 ABDOMINAL PAIN, GENERALIZED: ICD-10-CM

## 2019-12-26 PROCEDURE — 76700 US EXAM ABDOM COMPLETE: CPT

## 2019-12-30 ENCOUNTER — TELEPHONE (OUTPATIENT)
Dept: SURGERY | Facility: OTHER | Age: 44
End: 2019-12-30

## 2019-12-30 NOTE — TELEPHONE ENCOUNTER
Patient is looking for abdominal ultrasound results from 12/26/19.  Bibiana Escoto LPN........................12/30/2019  1:46 PM

## 2019-12-30 NOTE — TELEPHONE ENCOUNTER
Reason for call: Request for results.    Name of test or procedure: Scope/Abdominal scan    Date of test or procedure: 12/26    Location of test or procedure: GI    Preferred method for responding to this message: Telephone Call    Phone number patient can be reached at: Cell number on file:    Telephone Information:   Mobile [phone]       If we can't reach you directly, may we leave a detailed response at the number you provided?Yes

## 2019-12-31 NOTE — TELEPHONE ENCOUNTER
Patient given results per result note.  Bibiana Escoto LPN........................12/31/2019  1:04 PM

## 2020-01-27 ENCOUNTER — OFFICE VISIT (OUTPATIENT)
Dept: FAMILY MEDICINE | Facility: OTHER | Age: 45
End: 2020-01-27
Attending: NURSE PRACTITIONER
Payer: COMMERCIAL

## 2020-01-27 VITALS
TEMPERATURE: 98.8 F | WEIGHT: 157.38 LBS | HEIGHT: 66 IN | SYSTOLIC BLOOD PRESSURE: 108 MMHG | BODY MASS INDEX: 25.29 KG/M2 | HEART RATE: 85 BPM | RESPIRATION RATE: 18 BRPM | DIASTOLIC BLOOD PRESSURE: 68 MMHG | OXYGEN SATURATION: 97 %

## 2020-01-27 DIAGNOSIS — Z13.220 SCREENING FOR CHOLESTEROL LEVEL: ICD-10-CM

## 2020-01-27 DIAGNOSIS — Z12.4 SCREENING FOR CERVICAL CANCER: ICD-10-CM

## 2020-01-27 DIAGNOSIS — Z00.00 ROUTINE GENERAL MEDICAL EXAMINATION AT A HEALTH CARE FACILITY: ICD-10-CM

## 2020-01-27 DIAGNOSIS — Z86.39 HISTORY OF VITAMIN D DEFICIENCY: Primary | ICD-10-CM

## 2020-01-27 DIAGNOSIS — N90.89 LABIAL LESION: ICD-10-CM

## 2020-01-27 DIAGNOSIS — Z11.51 SCREENING FOR HUMAN PAPILLOMAVIRUS: ICD-10-CM

## 2020-01-27 PROCEDURE — 99396 PREV VISIT EST AGE 40-64: CPT | Performed by: NURSE PRACTITIONER

## 2020-01-27 PROCEDURE — 87624 HPV HI-RISK TYP POOLED RSLT: CPT | Mod: ZL | Performed by: NURSE PRACTITIONER

## 2020-01-27 PROCEDURE — 90471 IMMUNIZATION ADMIN: CPT

## 2020-01-27 PROCEDURE — 90715 TDAP VACCINE 7 YRS/> IM: CPT

## 2020-01-27 PROCEDURE — 40001026 ZZHCL STATISTICAL PAP TEST QC: Performed by: NURSE PRACTITIONER

## 2020-01-27 PROCEDURE — G0463 HOSPITAL OUTPT CLINIC VISIT: HCPCS

## 2020-01-27 PROCEDURE — G0123 SCREEN CERV/VAG THIN LAYER: HCPCS | Performed by: NURSE PRACTITIONER

## 2020-01-27 SDOH — HEALTH STABILITY: MENTAL HEALTH: HOW OFTEN DO YOU HAVE A DRINK CONTAINING ALCOHOL?: MONTHLY OR LESS

## 2020-01-27 ASSESSMENT — MIFFLIN-ST. JEOR: SCORE: 1367.66

## 2020-01-27 ASSESSMENT — PAIN SCALES - GENERAL: PAINLEVEL: NO PAIN (0)

## 2020-01-27 NOTE — NURSING NOTE
Patient presents to clinic for physical and pap.    Medication Reconciliation: complete    Marcie Morin LPN

## 2020-01-27 NOTE — NURSING NOTE
"Chief Complaint   Patient presents with     Physical     annual exam with pap       Initial /68 (BP Location: Right arm, Patient Position: Sitting, Cuff Size: Adult Regular)   Pulse 85   Temp 98.8  F (37.1  C) (Tympanic)   Resp 18   Ht 1.664 m (5' 5.5\")   Wt 71.4 kg (157 lb 6 oz)   LMP  (LMP Unknown)   SpO2 97%   Breastfeeding No   BMI 25.79 kg/m   Estimated body mass index is 25.79 kg/m  as calculated from the following:    Height as of this encounter: 1.664 m (5' 5.5\").    Weight as of this encounter: 71.4 kg (157 lb 6 oz).  Medication Reconciliation: Completed    Little Baltazar LPN............. 1/27/2020 11:23 AM     Immunization Documentation    Prior to Immunization administration, verified patients identity using patient's name and date of birth. Please see IMMUNIZATIONS  and order for additional information.  Patient / Parent instructed to remain in clinic for 15 minutes and report any adverse reaction to staff immediately.    Was entire vial of medication used? Yes  Vial/Syringe: Syringe    Little Baltazar LPN  1/27/2020   11:23 AM    "
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
## 2020-01-30 ENCOUNTER — HOSPITAL ENCOUNTER (OUTPATIENT)
Dept: MAMMOGRAPHY | Facility: OTHER | Age: 45
Discharge: HOME | End: 2020-01-30
Attending: FAMILY MEDICINE | Admitting: FAMILY MEDICINE
Payer: COMMERCIAL

## 2020-01-30 DIAGNOSIS — Z12.31 VISIT FOR SCREENING MAMMOGRAM: ICD-10-CM

## 2020-01-30 PROCEDURE — 77067 SCR MAMMO BI INCL CAD: CPT

## 2020-01-31 ENCOUNTER — OFFICE VISIT (OUTPATIENT)
Dept: OBGYN | Facility: OTHER | Age: 45
End: 2020-01-31
Attending: NURSE PRACTITIONER
Payer: COMMERCIAL

## 2020-01-31 VITALS
DIASTOLIC BLOOD PRESSURE: 60 MMHG | HEART RATE: 64 BPM | WEIGHT: 157 LBS | BODY MASS INDEX: 25.73 KG/M2 | SYSTOLIC BLOOD PRESSURE: 106 MMHG

## 2020-01-31 DIAGNOSIS — Z86.39 HISTORY OF VITAMIN D DEFICIENCY: ICD-10-CM

## 2020-01-31 DIAGNOSIS — Z13.220 SCREENING FOR CHOLESTEROL LEVEL: ICD-10-CM

## 2020-01-31 DIAGNOSIS — N90.89 LABIAL LESION: Primary | ICD-10-CM

## 2020-01-31 LAB
CHOLEST SERPL-MCNC: 184 MG/DL
DEPRECATED CALCIDIOL+CALCIFEROL SERPL-MC: 29.6 NG/ML
HDLC SERPL-MCNC: 45 MG/DL (ref 23–92)
LDLC SERPL CALC-MCNC: 114 MG/DL
NONHDLC SERPL-MCNC: 139 MG/DL
TRIGL SERPL-MCNC: 126 MG/DL

## 2020-01-31 PROCEDURE — 88305 TISSUE EXAM BY PATHOLOGIST: CPT

## 2020-01-31 PROCEDURE — 56605 BIOPSY OF VULVA/PERINEUM: CPT | Performed by: OBSTETRICS & GYNECOLOGY

## 2020-01-31 PROCEDURE — 99214 OFFICE O/P EST MOD 30 MIN: CPT | Mod: 25 | Performed by: OBSTETRICS & GYNECOLOGY

## 2020-01-31 PROCEDURE — 80061 LIPID PANEL: CPT | Mod: ZL | Performed by: NURSE PRACTITIONER

## 2020-01-31 PROCEDURE — 36415 COLL VENOUS BLD VENIPUNCTURE: CPT | Mod: ZL | Performed by: NURSE PRACTITIONER

## 2020-01-31 PROCEDURE — G0463 HOSPITAL OUTPT CLINIC VISIT: HCPCS | Mod: 25

## 2020-01-31 PROCEDURE — 82306 VITAMIN D 25 HYDROXY: CPT | Mod: ZL | Performed by: NURSE PRACTITIONER

## 2020-01-31 ASSESSMENT — PAIN SCALES - GENERAL: PAINLEVEL: NO PAIN (0)

## 2020-01-31 NOTE — NURSING NOTE
Chief Complaint   Patient presents with     Consult For     Labial Lesion      Prior to the start of the procedure and with procedural staff participation, I verbally confirmed the patient s identity using two indicators, relevant allergies, that the procedure was appropriate and matched the consent or emergent situation, and that the correct equipment/implants were available. Immediately prior to starting the procedure I conducted the Time Out with the procedural staff and re-confirmed the patient s name, procedure, and site/side. (The Joint Commission universal protocol was followed.)  Yes    Sedation (Moderate or Deep): None    Medication Reconciliation: completed   Jo Ann Blevins LPN  1/31/2020 9:29 AM

## 2020-01-31 NOTE — PROGRESS NOTES
Follow-Up Visit    S: Ms. Bibiana Lira is a 44 year old  here for a persistent labial lesion. It is on her left labia minora. It doesn't itch or bleed but she is concerned because she has a history of HPV associated cervical changes and is concerned it is a wart. She had a LEEP in 2016 with normal pap smears since.     O:  /60 (BP Location: Right arm, Patient Position: Sitting, Cuff Size: Adult Regular)   Pulse 64   Wt 71.2 kg (157 lb)   LMP  (Approximate)   Breastfeeding No   BMI 25.73 kg/m    Gen: Well-appearing, NAD  Pulm: nonlabored  Psych: Appropriate mood and affect    Pelvic:  Normal appearing external female genitalia except a 3mm vulvar skin tag vs genital wart on the lateral aspect of the left labia minora    Procedure Note  After obtaining informed consent, the area was prepped with betadine and infiltrated with 0.5cc of 1% lidocaine. The lesion was elevated and excised at the base with scissors. The specimen was sent for pathology. The site was made hemostatic with silver nitrate    A/P:  Ms. Bibiana Lira is a 44 year old  here for a labial lesion. Patient had many questions regarding HPV, how it is transmitted, impacts for future sexual partners, and the gardasil vaccine. All questions were answered today. Will call with pathology results    25 minutes were spent with the patient with >50% spent counseling on HPV, additional 5 min spent performing above procedure.      Annie Villarreal MD  OB/GYN  2020 10:35 AM

## 2020-02-03 LAB
COPATH REPORT: NORMAL
PAP: NORMAL

## 2020-02-05 LAB
FINAL DIAGNOSIS: ABNORMAL
HPV HR 12 DNA CVX QL NAA+PROBE: POSITIVE
HPV16 DNA SPEC QL NAA+PROBE: NEGATIVE
HPV18 DNA SPEC QL NAA+PROBE: NEGATIVE
SPECIMEN DESCRIPTION: ABNORMAL
SPECIMEN SOURCE CVX/VAG CYTO: ABNORMAL

## 2020-05-18 ENCOUNTER — TELEPHONE (OUTPATIENT)
Dept: FAMILY MEDICINE | Facility: OTHER | Age: 45
End: 2020-05-18

## 2020-05-18 NOTE — TELEPHONE ENCOUNTER
She had a pap done.It came back HPV positive She is wanting your opinion about what to do for any follow up. She has a pap every year and had a leep procedure. She has not had any new partners . She does not know how much longer she will have health insurance if something else needs to be done.Anna Solares LPN..................5/18/2020   1:20 PM

## 2020-05-18 NOTE — TELEPHONE ENCOUNTER
Due for a repeat pap at end of Jan 2021.  Would not intervene any other way at this time.  Margoth Ramachandran, DO on 5/18/2020 at 1:30 PM

## 2020-05-18 NOTE — TELEPHONE ENCOUNTER
SMS-Patient called and stated she had some test results from pap,  that she would like to talk about and find out if she needs any follow up. Please call    Chucky Gutierrez on 5/18/2020 at 10:50 AM

## 2020-10-03 ENCOUNTER — OFFICE VISIT (OUTPATIENT)
Dept: FAMILY MEDICINE | Facility: OTHER | Age: 45
End: 2020-10-03
Attending: FAMILY MEDICINE
Payer: COMMERCIAL

## 2020-10-03 VITALS
WEIGHT: 167 LBS | RESPIRATION RATE: 18 BRPM | HEART RATE: 84 BPM | DIASTOLIC BLOOD PRESSURE: 86 MMHG | SYSTOLIC BLOOD PRESSURE: 132 MMHG | BODY MASS INDEX: 27.37 KG/M2 | TEMPERATURE: 99 F | OXYGEN SATURATION: 97 %

## 2020-10-03 DIAGNOSIS — J02.9 SORETHROAT: Primary | ICD-10-CM

## 2020-10-03 DIAGNOSIS — J02.9 VIRAL PHARYNGITIS: ICD-10-CM

## 2020-10-03 LAB
SPECIMEN SOURCE: NORMAL
STREP GROUP A PCR: NOT DETECTED

## 2020-10-03 PROCEDURE — 87651 STREP A DNA AMP PROBE: CPT | Mod: ZL | Performed by: FAMILY MEDICINE

## 2020-10-03 PROCEDURE — 99214 OFFICE O/P EST MOD 30 MIN: CPT | Performed by: FAMILY MEDICINE

## 2020-10-03 PROCEDURE — G0463 HOSPITAL OUTPT CLINIC VISIT: HCPCS

## 2020-10-03 PROCEDURE — C9803 HOPD COVID-19 SPEC COLLECT: HCPCS

## 2020-10-03 PROCEDURE — U0003 INFECTIOUS AGENT DETECTION BY NUCLEIC ACID (DNA OR RNA); SEVERE ACUTE RESPIRATORY SYNDROME CORONAVIRUS 2 (SARS-COV-2) (CORONAVIRUS DISEASE [COVID-19]), AMPLIFIED PROBE TECHNIQUE, MAKING USE OF HIGH THROUGHPUT TECHNOLOGIES AS DESCRIBED BY CMS-2020-01-R: HCPCS | Mod: ZL | Performed by: FAMILY MEDICINE

## 2020-10-03 RX ORDER — FLUOROURACIL 50 MG/G
CREAM TOPICAL
COMMUNITY
Start: 2020-09-28 | End: 2023-06-16

## 2020-10-03 ASSESSMENT — PAIN SCALES - GENERAL: PAINLEVEL: MODERATE PAIN (4)

## 2020-10-03 NOTE — PROGRESS NOTES
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  A strep screen was obtained before my exam and has already returned negative.  She really does not have much for significant respiratory symptoms.  Denies cough chills fever sweats but generally feels body aches and uncomfortable.  Mild headache.  But does have a history of migraine headaches.  Has had some other symptoms that have developed over the last year including last menstrual period has been many months ago but is not sexually active and is convinced she is not pregnant.  Started to have some hot flashes and dresses warmer.  She is current on her GYN checkups.  Patient is  and have children ages 11 and 14 that are back to school in the [employer].  She denies any GI concerns or any urinary troubles either.  Has not had COVID testing while working in her nursing capacity so this will be her first COVID test.  She is prompted to do that because she was supposed to go to a  today for an elderly gentleman, but was fearful of spreading COVID if she is positive and therefore is going to not go to that .  Her 90-year-old grandmother was planning on going with the patient's brother to that same  also.    PMH history of hearing loss and migraine headaches and abdominal GI symptoms with an acute pyloric channel ulcer history    ROS: Comprehensively negative other than already noted in the HPI    Exam: Alert pleasant lady in no distress with acceptable vital signs and is afebrile although temperature is 99    HEENT: Entirely negative, very small tonsils minimal inflammation of the throat noted, TMs normal, eyes clear, nares clear  Neck: Supple  Chest: Clear to auscultation  Abdomen: Soft nontender  Dermatologic exam unremarkable    Strep screen: Negative    Exam: Pharyngitis, probably viral, rule out COVID-19 and symptomatic  measures

## 2020-10-03 NOTE — PATIENT INSTRUCTIONS
Patient Education     When You Have a Sore Throat    A sore throat can be painful. There are many reasons why you may have a sore throat. Your healthcare provider will work with you to find the cause of your sore throat. He or she will also find the best treatment for you.  What causes a sore throat?  Sore throats can be caused or worsened by:    Cold or flu viruses    Bacteria    Irritants such as tobacco smoke or air pollution    Acid reflux  A healthy throat  The tonsils are on the sides of the throat near the base of the tongue. They collect viruses and bacteria and help fight infection. The throat (pharynx) is the passage for air. Mucus from the nasal cavity also moves down the passage.  An inflamed throat  The tonsils and pharynx can become inflamed due to a cold or flu virus. Postnasal drip (excess mucus draining from the nasal cavity) can irritate the throat. It can also make the throat or tonsils more likely to be infected by bacteria. Severe, untreated tonsillitis in children or adults can cause a pocket of pus (abscess) to form near the tonsil.  Your evaluation  A medical evaluation can help find the cause of your sore throat. It can also help your healthcare provider choose the best treatment for you. The evaluation may include a health history, physical exam, and diagnostic tests.  Health history  Your healthcare provider may ask you the following:    How long has the sore throat lasted and how have you been treating it?    Do you have any other symptoms, such as body aches, fever, or cough?    Does your sore throat recur? If so, how often? How many days of school or work have you missed because of a sore throat?    Do you have trouble eating or swallowing?    Have you been told that you snore or have other sleep problems?    Do you have bad breath?    Do you cough up bad-tasting mucus?  Physical exam  During the exam, your healthcare provider checks your ears, nose, and throat for problems. He or she  "also checks for swelling in the neck, and may listen to your chest.  Possible tests  Other tests your healthcare provider may perform include:    A throat swab to check for bacteria such as streptococcus (the bacteria that causes strep throat)    A blood test to check for mononucleosis (a viral infection)    A chest X-ray to rule out pneumonia, especially if you have a cough  Treating a sore throat  Treatment depends on many factors. What is the likely cause? Is the problem recent? Does it keep coming back? In many cases, the best thing to do is to treat the symptoms, rest, and let the problem heal itself. Antibiotics may help clear up some bacterial infections. For cases of severe or recurring tonsillitis, the tonsils may need to be removed.  Relieving your symptoms    Don t smoke, and avoid secondhand smoke.    For children, try throat sprays or Popsicles. Adults and older children may try lozenges.    Drink warm liquids to soothe the throat and help thin mucus. Avoid alcohol, spicy foods, and acidic drinks such as orange juice. These can irritate the throat.    Gargle with warm saltwater (1 teaspoon of salt to 8 ounces of warm water).    Use a humidifier to keep air moist and relieve throat dryness.    Try over-the-counter pain relievers such as acetaminophen or ibuprofen. Use as directed, and don t exceed the recommended dose. Don t give aspirin to children.   Are antibiotics needed?  If your sore throat is due to a bacterial infection, antibiotics may speed healing and prevent complications. Although group A streptococcus (\"strep throat\" or GAS) is the major treatable infection for a sore throat, GAS causes only 5% to 15% of sore throats in adults who seek medical care. Most sore throats are caused by cold or flu viruses. And antibiotics don t treat viral illness. In fact, using antibiotics when they re not needed may produce bacteria that are harder to kill. Your healthcare provider will prescribe antibiotics " only if he or she thinks they are likely to help.  If antibiotics are prescribed  Take the medicine exactly as directed. Be sure to finish your prescription even if you re feeling better. And be sure to ask your healthcare provider or pharmacist what side effects are common and what to do about them.  Is surgery needed?  In some cases, tonsils need to be removed. This is often done as outpatient (same-day) surgery. Your healthcare provider may advise removing the tonsils in cases of:    Several severe bouts of tonsillitis in a year.  Severe  episodes include those that lead to missed days of school or work, or that need to be treated with antibiotics.    Tonsillitis that causes breathing problems during sleep    Tonsillitis caused by food particles collecting in pouches in the tonsils (cryptic tonsillitis)  Call your healthcare provider if any of the following occur:    Symptoms worsen, or new symptoms develop.    Swollen tonsils make breathing difficult.    The pain is severe enough to keep you from drinking liquids.    A skin rash, hives, or wheezing develops. Any of these could signal an allergic reaction to antibiotics.    Symptoms don t improve within a week.    Symptoms don t improve within 2 to 3 days of starting antibiotics.   Date Last Reviewed: 10/1/2016    3527-1014 [employer]. [address] [place] [address], [place] [address]. All rights reserved. This information is not intended as a substitute for professional medical care. Always follow your healthcare professional's instructions.

## 2020-10-03 NOTE — NURSING NOTE
"Chief Complaint   Patient presents with     Pharyngitis     Sore throat, swollen glands, and fatigue started yesterday.     Initial There were no vitals taken for this visit. Estimated body mass index is 25.73 kg/m  as calculated from the following:    Height as of 1/27/20: 1.664 m (5' 5.5\").    Weight as of 1/31/20: 71.2 kg (157 lb).    Medication Reconciliation: complete      Brandon Jimenez LPN  "

## 2020-10-04 LAB
SARS-COV-2 RNA SPEC QL NAA+PROBE: NOT DETECTED
SPECIMEN SOURCE: NORMAL

## 2020-12-10 ENCOUNTER — OFFICE VISIT (OUTPATIENT)
Dept: FAMILY MEDICINE | Facility: OTHER | Age: 45
End: 2020-12-10
Attending: NURSE PRACTITIONER
Payer: COMMERCIAL

## 2020-12-10 VITALS
HEIGHT: 65 IN | RESPIRATION RATE: 18 BRPM | OXYGEN SATURATION: 98 % | SYSTOLIC BLOOD PRESSURE: 122 MMHG | DIASTOLIC BLOOD PRESSURE: 86 MMHG | HEART RATE: 94 BPM | BODY MASS INDEX: 27.19 KG/M2 | TEMPERATURE: 97.2 F | WEIGHT: 163.2 LBS

## 2020-12-10 DIAGNOSIS — Z13.220 LIPID SCREENING: Primary | ICD-10-CM

## 2020-12-10 DIAGNOSIS — R53.83 FATIGUE, UNSPECIFIED TYPE: ICD-10-CM

## 2020-12-10 DIAGNOSIS — Z12.4 CERVICAL CANCER SCREENING: ICD-10-CM

## 2020-12-10 DIAGNOSIS — E55.9 VITAMIN D DEFICIENCY: ICD-10-CM

## 2020-12-10 LAB
ALBUMIN SERPL-MCNC: 4.3 G/DL (ref 3.5–5.7)
ALP SERPL-CCNC: 69 U/L (ref 34–104)
ALT SERPL W P-5'-P-CCNC: 16 U/L (ref 7–52)
ANION GAP SERPL CALCULATED.3IONS-SCNC: 8 MMOL/L (ref 3–14)
AST SERPL W P-5'-P-CCNC: 18 U/L (ref 13–39)
BASOPHILS # BLD AUTO: 0 10E9/L (ref 0–0.2)
BASOPHILS NFR BLD AUTO: 0.5 %
BILIRUB SERPL-MCNC: 0.5 MG/DL (ref 0.3–1)
BUN SERPL-MCNC: 9 MG/DL (ref 7–25)
CALCIUM SERPL-MCNC: 9.2 MG/DL (ref 8.6–10.3)
CHLORIDE SERPL-SCNC: 104 MMOL/L (ref 98–107)
CHOLEST SERPL-MCNC: 194 MG/DL
CO2 SERPL-SCNC: 25 MMOL/L (ref 21–31)
CREAT SERPL-MCNC: 0.67 MG/DL (ref 0.6–1.2)
DEPRECATED CALCIDIOL+CALCIFEROL SERPL-MC: 32.6 NG/ML
DIFFERENTIAL METHOD BLD: NORMAL
EOSINOPHIL # BLD AUTO: 0 10E9/L (ref 0–0.7)
EOSINOPHIL NFR BLD AUTO: 0.5 %
ERYTHROCYTE [DISTWIDTH] IN BLOOD BY AUTOMATED COUNT: 12.1 % (ref 10–15)
GFR SERPL CREATININE-BSD FRML MDRD: >90 ML/MIN/{1.73_M2}
GLUCOSE SERPL-MCNC: 100 MG/DL (ref 70–105)
HCT VFR BLD AUTO: 38.5 % (ref 35–47)
HDLC SERPL-MCNC: 45 MG/DL (ref 23–92)
HGB BLD-MCNC: 12.9 G/DL (ref 11.7–15.7)
IMM GRANULOCYTES # BLD: 0 10E9/L (ref 0–0.4)
IMM GRANULOCYTES NFR BLD: 0.2 %
LDLC SERPL CALC-MCNC: 118 MG/DL
LYMPHOCYTES # BLD AUTO: 2.2 10E9/L (ref 0.8–5.3)
LYMPHOCYTES NFR BLD AUTO: 34.1 %
MCH RBC QN AUTO: 30.8 PG (ref 26.5–33)
MCHC RBC AUTO-ENTMCNC: 33.5 G/DL (ref 31.5–36.5)
MCV RBC AUTO: 92 FL (ref 78–100)
MONOCYTES # BLD AUTO: 0.5 10E9/L (ref 0–1.3)
MONOCYTES NFR BLD AUTO: 8 %
NEUTROPHILS # BLD AUTO: 3.6 10E9/L (ref 1.6–8.3)
NEUTROPHILS NFR BLD AUTO: 56.7 %
NONHDLC SERPL-MCNC: 149 MG/DL
PLATELET # BLD AUTO: 330 10E9/L (ref 150–450)
POTASSIUM SERPL-SCNC: 3.6 MMOL/L (ref 3.5–5.1)
PROT SERPL-MCNC: 7.4 G/DL (ref 6.4–8.9)
RBC # BLD AUTO: 4.19 10E12/L (ref 3.8–5.2)
SODIUM SERPL-SCNC: 137 MMOL/L (ref 134–144)
TRIGL SERPL-MCNC: 155 MG/DL
TSH SERPL DL<=0.05 MIU/L-ACNC: 1.74 IU/ML (ref 0.34–5.6)
WBC # BLD AUTO: 6.3 10E9/L (ref 4–11)

## 2020-12-10 PROCEDURE — G0123 SCREEN CERV/VAG THIN LAYER: HCPCS | Performed by: NURSE PRACTITIONER

## 2020-12-10 PROCEDURE — G0463 HOSPITAL OUTPT CLINIC VISIT: HCPCS

## 2020-12-10 PROCEDURE — 82306 VITAMIN D 25 HYDROXY: CPT | Mod: ZL | Performed by: NURSE PRACTITIONER

## 2020-12-10 PROCEDURE — 999N001140 HC STATISTICAL PAP TEST QC: Performed by: NURSE PRACTITIONER

## 2020-12-10 PROCEDURE — 80053 COMPREHEN METABOLIC PANEL: CPT | Mod: ZL | Performed by: NURSE PRACTITIONER

## 2020-12-10 PROCEDURE — 36415 COLL VENOUS BLD VENIPUNCTURE: CPT | Mod: ZL | Performed by: NURSE PRACTITIONER

## 2020-12-10 PROCEDURE — 80061 LIPID PANEL: CPT | Mod: ZL | Performed by: NURSE PRACTITIONER

## 2020-12-10 PROCEDURE — 99213 OFFICE O/P EST LOW 20 MIN: CPT | Performed by: NURSE PRACTITIONER

## 2020-12-10 PROCEDURE — 84443 ASSAY THYROID STIM HORMONE: CPT | Mod: ZL | Performed by: NURSE PRACTITIONER

## 2020-12-10 PROCEDURE — 85025 COMPLETE CBC W/AUTO DIFF WBC: CPT | Mod: ZL | Performed by: NURSE PRACTITIONER

## 2020-12-10 PROCEDURE — 87624 HPV HI-RISK TYP POOLED RSLT: CPT | Mod: ZL | Performed by: NURSE PRACTITIONER

## 2020-12-10 PROCEDURE — 88142 CYTOPATH C/V THIN LAYER: CPT | Performed by: NURSE PRACTITIONER

## 2020-12-10 ASSESSMENT — PAIN SCALES - GENERAL: PAINLEVEL: MILD PAIN (2)

## 2020-12-10 ASSESSMENT — MIFFLIN-ST. JEOR: SCORE: 1381.15

## 2020-12-10 NOTE — PROGRESS NOTES
HPI:    Bibiana Lira is a 45 year old female who presents to clinic today for request for labs.  She last had lipid panel and Pap smear completed 2020.  She has had borderline high cholesterol levels.  She also had positive other high risk HPV on her last Pap smear.  Previously had been several years since she had an abnormal Pap smear.  Is recommended she follow-up in 1 year.  She states she is losing her insurance at the end of the month and would like to see about getting testing completed today.  She continues to take supplements for vitamin D also because she has significant fatigue.  No other concerns at this time.    Past Medical History:   Diagnosis Date     SAM II (cervical intraepithelial neoplasia II)     LEEP completed     Irritable bowel syndrome without diarrhea     avoids caramel color; gluten free     Personal history of other medical treatment (CODE)     , both vaginal deliveries.  Prolapsed uterus with first delivery.     Stomach ulcer 2019       Past Surgical History:   Procedure Laterality Date     COLONOSCOPY N/A 2019    F/U  normal     ENDOSCOPY  2019    Pyloric ulcer     ESOPHAGOSCOPY, GASTROSCOPY, DUODENOSCOPY (EGD), COMBINED N/A 2019    Procedure: ESOPHAGOGASTRODUODENOSCOPY, WITH BIOPSY;  Surgeon: Oli Jackson MD;  Location: [facility] OR     EXTRACTION(S) DENTAL      [place] teeth extraction     LEEP TX, CERVICAL  2016     OTHER SURGICAL HISTORY      10/26/2017,[id number],ANKLE SURGERY,ligamentous repair; Dr. Ren       Family History   Problem Relation Age of Onset     Diabetes Mother         Diabetes     Hypertension Mother         Hypertension     Heart Disease Mother         Heart Disease,pacemaker     Other - See Comments Mother         Stoke x 2     Hyperlipidemia Mother         Hyperlipidemia     Cerebrovascular Disease Mother      Diabetes Type 2  Mother      Other - See Comments Father         Actinic keratoses     Hypertension Father          Hypertension     Hyperlipidemia Father         Hyperlipidemia     Diabetes Type 2  Father         Diabetes type II     Family History Negative Brother         Good Health     Other - See Comments Sister         Gluten intolerance       Social History     Socioeconomic History     Marital status: Single     Spouse name: Not on file     Number of children: Not on file     Years of education: Not on file     Highest education level: Not on file   Occupational History     Not on file   Social Needs     Financial resource strain: Not on file     Food insecurity     Worry: Not on file     Inability: Not on file     Transportation needs     Medical: Not on file     Non-medical: Not on file   Tobacco Use     Smoking status: Never Smoker     Smokeless tobacco: Never Used   Substance and Sexual Activity     Alcohol use: Yes     Frequency: Monthly or less     Comment:  rare     Drug use: No     Sexual activity: Not Currently     Partners: Male     Comment: Birth control method: would like to become sexually active soon with boyfriend   Lifestyle     Physical activity     Days per week: Not on file     Minutes per session: Not on file     Stress: Not on file   Relationships     Social connections     Talks on phone: Not on file     Gets together: Not on file     Attends Restorationist service: Not on file     Active member of club or organization: Not on file     Attends meetings of clubs or organizations: Not on file     Relationship status: Not on file     Intimate partner violence     Fear of current or ex partner: Not on file     Emotionally abused: Not on file     Physically abused: Not on file     Forced sexual activity: Not on file   Other Topics Concern     Not on file   Social History Narrative     2010, final in 2011.  Two children.    Living with her parents in [place].    Employed with [employer].    Starting LPN training fall 2013, completed.    In relationship - possibly thinking about marriage in 2016.  "      Current Outpatient Medications   Medication Sig Dispense Refill     fluorouracil (EFUDEX) 5 % external cream          Allergies   Allergen Reactions     Gluten Meal GI Disturbance     \"flu like symptoms\"     Lactase GI Disturbance     AND fatigue     Orange Oil Headache     migraine     Sulfa Drugs      Verified in [employer]: Y, Severity in [employer]: S  Other reaction(s): Erythema     Sulfadiazine Rash       ROS:  Pertinent positives and negatives are noted in HPI.    EXAM:  /86 (BP Location: Right arm, Patient Position: Sitting, Cuff Size: Adult Regular)   Pulse 94   Temp 97.2  F (36.2  C) (Temporal)   Resp 18   Ht 1.651 m (5' 5\")   Wt 74 kg (163 lb 3.2 oz)   SpO2 98%   BMI 27.16 kg/m    General appearance: well appearing female, in no acute distress  Genitourinary Exam Female: External genitalia, vulva and vagina appear normal. Speculum exam reveals.  Normal exam.  Pap smear was obtained, moderate cervical bleeding noted.  Bimanual exam reveals normal uterus and adnexa with no masses, nontender urethra and bladder.   Psychological: normal affect, alert and pleasant    ASSESSMENT AND PLAN:    1. Lipid screening    2. Fatigue, unspecified type    3. Cervical cancer screening    4. Vitamin D deficiency      Vitamin D, CMP, CBC, lipid panel obtained for screening due to fatigue as well as vitamin D deficiency and history of elevated cholesterol levels.  Pap smear obtained as well.  We will follow-up with test results when these are available.      DEONTE Burnham CNP..................12/10/2020 8:10 AM      This document was prepared using voice generated software.  While every attempt was made for accuracy, grammatical errors may exist.  "

## 2020-12-10 NOTE — NURSING NOTE
"Chief Complaint   Patient presents with     RECHECK     cholesterol     Patient presents to clinic for cholesterol check and possibly other labs needed. She states she is losing her insurance at the end of the month so wanting to see where she is at. She would also like to discuss her last pap.     Initial /86 (BP Location: Right arm, Patient Position: Sitting, Cuff Size: Adult Regular)   Pulse 94   Temp 97.2  F (36.2  C) (Temporal)   Resp 18   Ht 1.651 m (5' 5\")   Wt 74 kg (163 lb 3.2 oz)   SpO2 98%   BMI 27.16 kg/m   Estimated body mass index is 27.16 kg/m  as calculated from the following:    Height as of this encounter: 1.651 m (5' 5\").    Weight as of this encounter: 74 kg (163 lb 3.2 oz).         Medication Reconciliation: Complete      [place]   "

## 2020-12-17 LAB
COPATH REPORT: NORMAL
PAP: NORMAL

## 2021-01-03 ENCOUNTER — HEALTH MAINTENANCE LETTER (OUTPATIENT)
Age: 46
End: 2021-01-03

## 2021-01-12 ENCOUNTER — TELEPHONE (OUTPATIENT)
Dept: FAMILY MEDICINE | Facility: OTHER | Age: 46
End: 2021-01-12

## 2021-01-12 DIAGNOSIS — Z01.89 PATIENT REQUEST FOR DIAGNOSTIC TESTING: ICD-10-CM

## 2021-01-12 DIAGNOSIS — Z11.59 SCREENING FOR VIRAL DISEASE: Primary | ICD-10-CM

## 2021-01-12 NOTE — TELEPHONE ENCOUNTER
Lab order placed please assist in scheduling appointment; even with positive or negative results - not a lot of change to:  medical decisions, protective equipment, etc is recommended as we are not completely aware of the importance, duration, etc of immunity to Covid.  Results typically taking ~3-5 days (but can be longer).    Margoth Ramachandran, DO

## 2021-01-12 NOTE — TELEPHONE ENCOUNTER
Left message to call back....................  1/12/2021   12:40 PM  Shi Dawkins LPN, LPN  1/12/2021  12:40 PM

## 2021-01-12 NOTE — TELEPHONE ENCOUNTER
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 Please leave message if she doesn't answer. Thank you.   Sofi Samson

## 2021-01-15 DIAGNOSIS — Z11.59 SCREENING FOR VIRAL DISEASE: ICD-10-CM

## 2021-01-15 DIAGNOSIS — Z01.89 PATIENT REQUEST FOR DIAGNOSTIC TESTING: ICD-10-CM

## 2021-01-15 PROCEDURE — 86769 SARS-COV-2 COVID-19 ANTIBODY: CPT | Mod: ZL | Performed by: FAMILY MEDICINE

## 2021-01-15 PROCEDURE — 36415 COLL VENOUS BLD VENIPUNCTURE: CPT | Mod: ZL | Performed by: FAMILY MEDICINE

## 2021-01-16 LAB
SARS-COV-2 AB PNL SERPL IA: NEGATIVE
SARS-COV-2 IGG SERPL IA-ACNC: NORMAL

## 2021-02-01 ENCOUNTER — HOSPITAL ENCOUNTER (OUTPATIENT)
Dept: MAMMOGRAPHY | Facility: OTHER | Age: 46
Discharge: HOME | End: 2021-02-01
Attending: FAMILY MEDICINE | Admitting: FAMILY MEDICINE
Payer: COMMERCIAL

## 2021-02-01 DIAGNOSIS — Z12.31 VISIT FOR SCREENING MAMMOGRAM: ICD-10-CM

## 2021-02-01 PROCEDURE — 77063 BREAST TOMOSYNTHESIS BI: CPT

## 2021-03-06 ENCOUNTER — HEALTH MAINTENANCE LETTER (OUTPATIENT)
Age: 46
End: 2021-03-06

## 2021-05-21 ENCOUNTER — OFFICE VISIT (OUTPATIENT)
Dept: FAMILY MEDICINE | Facility: OTHER | Age: 46
End: 2021-05-21
Attending: FAMILY MEDICINE
Payer: COMMERCIAL

## 2021-05-21 DIAGNOSIS — J02.9 SORETHROAT: Primary | ICD-10-CM

## 2021-05-21 LAB
SARS-COV-2 RNA RESP QL NAA+PROBE: NORMAL
SPECIMEN SOURCE: NORMAL

## 2021-05-21 PROCEDURE — U0003 INFECTIOUS AGENT DETECTION BY NUCLEIC ACID (DNA OR RNA); SEVERE ACUTE RESPIRATORY SYNDROME CORONAVIRUS 2 (SARS-COV-2) (CORONAVIRUS DISEASE [COVID-19]), AMPLIFIED PROBE TECHNIQUE, MAKING USE OF HIGH THROUGHPUT TECHNOLOGIES AS DESCRIBED BY CMS-2020-01-R: HCPCS | Mod: ZL | Performed by: FAMILY MEDICINE

## 2021-05-21 PROCEDURE — U0005 INFEC AGEN DETEC AMPLI PROBE: HCPCS | Mod: ZL | Performed by: FAMILY MEDICINE

## 2021-05-21 PROCEDURE — C9803 HOPD COVID-19 SPEC COLLECT: HCPCS

## 2021-05-22 LAB
LABORATORY COMMENT REPORT: NORMAL
SARS-COV-2 RNA RESP QL NAA+PROBE: NEGATIVE
SPECIMEN SOURCE: NORMAL

## 2021-10-09 ENCOUNTER — HEALTH MAINTENANCE LETTER (OUTPATIENT)
Age: 46
End: 2021-10-09

## 2021-12-30 ENCOUNTER — TRANSFERRED RECORDS (OUTPATIENT)
Dept: HEALTH INFORMATION MANAGEMENT | Facility: CLINIC | Age: 46
End: 2021-12-30

## 2022-03-26 ENCOUNTER — HEALTH MAINTENANCE LETTER (OUTPATIENT)
Age: 47
End: 2022-03-26

## 2022-05-21 ENCOUNTER — HEALTH MAINTENANCE LETTER (OUTPATIENT)
Age: 47
End: 2022-05-21

## 2022-09-17 ENCOUNTER — HEALTH MAINTENANCE LETTER (OUTPATIENT)
Age: 47
End: 2022-09-17

## 2023-05-06 ENCOUNTER — HEALTH MAINTENANCE LETTER (OUTPATIENT)
Age: 48
End: 2023-05-06

## 2023-05-21 ENCOUNTER — OFFICE VISIT (OUTPATIENT)
Dept: FAMILY MEDICINE | Facility: OTHER | Age: 48
End: 2023-05-21
Payer: COMMERCIAL

## 2023-05-21 VITALS
SYSTOLIC BLOOD PRESSURE: 118 MMHG | RESPIRATION RATE: 16 BRPM | DIASTOLIC BLOOD PRESSURE: 76 MMHG | WEIGHT: 154.3 LBS | HEIGHT: 66 IN | HEART RATE: 70 BPM | OXYGEN SATURATION: 98 % | BODY MASS INDEX: 24.8 KG/M2 | TEMPERATURE: 98.6 F

## 2023-05-21 DIAGNOSIS — H65.93 FLUID LEVEL BEHIND TYMPANIC MEMBRANE OF BOTH EARS: ICD-10-CM

## 2023-05-21 DIAGNOSIS — R30.9 PAIN WITH URINATION: Primary | ICD-10-CM

## 2023-05-21 LAB
ALBUMIN UR-MCNC: NEGATIVE MG/DL
APPEARANCE UR: CLEAR
BILIRUB UR QL STRIP: NEGATIVE
COLOR UR AUTO: YELLOW
GLUCOSE UR STRIP-MCNC: NEGATIVE MG/DL
HGB UR QL STRIP: NEGATIVE
KETONES UR STRIP-MCNC: NEGATIVE MG/DL
LEUKOCYTE ESTERASE UR QL STRIP: NEGATIVE
NITRATE UR QL: NEGATIVE
PH UR STRIP: 6.5 [PH] (ref 5–9)
SP GR UR STRIP: 1.01 (ref 1–1.03)
UROBILINOGEN UR STRIP-MCNC: NORMAL MG/DL

## 2023-05-21 PROCEDURE — G0463 HOSPITAL OUTPT CLINIC VISIT: HCPCS

## 2023-05-21 PROCEDURE — 99213 OFFICE O/P EST LOW 20 MIN: CPT

## 2023-05-21 PROCEDURE — 81003 URINALYSIS AUTO W/O SCOPE: CPT | Mod: ZL

## 2023-05-21 ASSESSMENT — PAIN SCALES - GENERAL: PAINLEVEL: NO PAIN (0)

## 2023-05-21 NOTE — NURSING NOTE
"Pt presents to clinic today for possible UTI. Patient reports a few days ago, there was pain with urination. Patient reports that has now subsided, and this morning reports being significantly off-balance and dizzy and \"just not right.\"       FOOD SECURITY SCREENING QUESTIONS:    The next two questions are to help us understand your food security.  If you are feeling you need any assistance in this area, we have resources available to support you today.    Hunger Vital Signs:  Within the past 12 months we worried whether our food would run out before we got money to buy more. Never  Within the past 12 months the food we bought just didn't last and we didn't have money to get more. Never      Medication Reconciliation: complete  Ni Parrish LPN,LPN on 5/21/2023 at 12:39 PM     "

## 2023-05-21 NOTE — PATIENT INSTRUCTIONS
Middle ear effusion and nasal congestion: I recommend you trial a course of antihistamine and Flonase.  If symptoms worsen or you have concerns follow-up for an ear recheck.    For urinary symptoms: No infection noted on urinalysis, I do recommend you decrease caffeine intake as this can be irritating to the bladder.  I also recommend that you increase your water intake.  Follow-up if you develop any concerning symptoms such as worsening symptoms, fever, or have other concerns.

## 2023-05-21 NOTE — PROGRESS NOTES
ASSESSMENT/PLAN:    (R30.9) Pain with urination  (primary encounter diagnosis); (H65.93) Fluid level behind tympanic membrane of both ears  Comment: Patient presents today with concerns for dysuria.  This is about 2 days ago and it has now resided.  No fevers but she has hot flashes related to menopause.  She does note she felt a little off balance today but no lightheadedness, presyncope, chest pain, or shortness of breath.  She did have 3 cups of coffee today.  She also has a viral illness within the home and feels she may need a COVID test, but she does have COVID test at home so she would like to test there.  She does have nasal congestion and some ear irritation.  On exam she does have a fluid effusion of both ears.  No CVA tenderness or suprapubic tenderness.  No nystagmus.  UA was negative for infection.  I did advise her that caffeine can be irritating to urethra and I recommend decreasing caffeine intake and increasing water intake.  I did offer a COVID test but she declines today.  Her symptoms of being off balance may be related to fluid in middle ear.  We did discuss treatment with antihistamine and/or Flonase for nasal congestion and middle ear effusion.  Plan: UA Macroscopic with reflex to Microscopic and         Culture, CANCELED: UA Macroscopic with reflex         to Microscopic and Culture, CANCELED:         Symptomatic Influenza A/B, RSV, & SARS-CoV2 PCR        (COVID-19) Nose    Discussed warning signs/symptoms indicative of need to f/u    Follow up if symptoms persist or worsen or concerns    I have reviewed the nursing notes.  I have reviewed the findings, diagnosis, plan and need for follow up with the patient.    I explained my diagnostic considerations and recommendations to the patient, who voiced understanding and agreement with the treatment plan. All questions were answered. We discussed potential side effects of any prescribed or recommended therapies, as well as expectations for  response to treatments.    SALLY CA, APRN CNP  2023  1:00 PM    HPI:    Bibiana Lira is a 47 year old female  who presents to [facility] today for concerns of dysuria. This was two days ago, but has resided. No fevers, but she is having hot flashes related to menopause. She notes she felt a little off balance today. No lightheadedness or presyncope. No CP or SOB. She did have three cups of coffee today. She does have viral illness within the home, and feels she may need a COVID test.     Some ear irritation. She also has some nasal drainage/congestion.     Treatments tried: Increased fluids.     Allergies: Sulfa.     PCP: Duarte      Past Medical History:   Diagnosis Date     SAM II (cervical intraepithelial neoplasia II) 2016    LEEP completed     Irritable bowel syndrome without diarrhea     avoids caramel color; gluten free     Personal history of other medical treatment (CODE)     , both vaginal deliveries.  Prolapsed uterus with first delivery.     Stomach ulcer 2019     Past Surgical History:   Procedure Laterality Date     COLONOSCOPY N/A 2019    F/U  normal     ENDOSCOPY  2019    Pyloric ulcer     ESOPHAGOSCOPY, GASTROSCOPY, DUODENOSCOPY (EGD), COMBINED N/A 2019    Procedure: ESOPHAGOGASTRODUODENOSCOPY, WITH BIOPSY;  Surgeon: Oli Jackson MD;  Location: [facility] OR     EXTRACTION(S) DENTAL      [place] teeth extraction     LEEP TX, CERVICAL  2016     OTHER SURGICAL HISTORY      10/26/2017,[id number],ANKLE SURGERY,ligamentous repair; Dr. Ren     Social History     Tobacco Use     Smoking status: Never     Passive exposure: Never     Smokeless tobacco: Never   Vaping Use     Vaping status: Never Used     Passive vaping exposure: Yes   Substance Use Topics     Alcohol use: Yes     Comment:  rare     Current Outpatient Medications   Medication Sig Dispense Refill     fluorouracil (EFUDEX) 5 % external cream        Allergies   Allergen Reactions     Gluten Meal GI  "Disturbance     \"flu like symptoms\"     [place] Oil Headache     migraine     Sulfa Antibiotics      Verified in Meditech: Y, Severity in Meditech: S  Other reaction(s): Erythema     Tilactase GI Disturbance     AND fatigue     Sulfadiazine Rash     Past medical history, past surgical history, current medications and allergies reviewed and accurate to the best of my knowledge.      ROS:  Refer to HPI    /76 (BP Location: Left arm, Patient Position: Sitting, Cuff Size: Adult Regular)   Pulse 70   Temp 98.6  F (37  C) (Tympanic)   Resp 16   Ht 1.676 m (5' 6\")   Wt 70 kg (154 lb 4.8 oz)   LMP 02/21/2023 (Exact Date)   SpO2 98%   BMI 24.90 kg/m      EXAM:  General Appearance: Well appearing 47 year old female, appropriate appearance for age. No acute distress   Ears: Left TM intact, translucent with bony landmarks appreciated, no erythema, with effusion, no bulging, no purulence.  Right TM intact, translucent with bony landmarks appreciated, no erythema, with effusion, no bulging, no purulence.  Left auditory canal clear.  Right auditory canal clear.  Normal external ears, non tender.  Eyes: conjunctivae normal without erythema or irritation, corneas clear, no drainage or crusting, no eyelid swelling, PERRLA, [place]-Hallpike negative, no nystagmus.  Oropharynx: moist mucous membranes, no exudates or petechiae, no post nasal drip seen, no trismus, voice clear. Nose:  Bilateral nares: no erythema, no edema, no drainage or congestion   Neck: supple without adenopathy  Respiratory: normal chest wall and respirations.  Normal effort.  Clear to auscultation bilaterally, no wheezing, crackles or rhonchi.  No increased work of breathing.  No cough appreciated.  Cardiac: RRR with no murmurs  Abdomen: soft, nontender, no rigidity, no rebound tenderness or guarding, normal bowel sounds present  :  No suprapubic tenderness to palpation.  Absent CVA tenderness to palpation.    Musculoskeletal:  Equal movement of " bilateral upper extremities.  Equal movement of bilateral lower extremities.  Normal gait.    Dermatological: no rashes noted of exposed skin  Neuro: Alert and oriented to person, place, and time.  Cranial nerves II-XII grossly intact with no focal or lateralizing deficits.  Muscle tone normal.  Gait normal. No tremor.   Psychological: normal affect, alert, oriented, and pleasant.     Labs:  Results for orders placed or performed in visit on 05/21/23   UA Macroscopic with reflex to Microscopic and Culture     Status: Normal    Specimen: Urine, Midstream   Result Value Ref Range    Color Urine Yellow Colorless, Straw, Light Yellow, Yellow    Appearance Urine Clear Clear    Glucose Urine Negative Negative mg/dL    Bilirubin Urine Negative Negative    Ketones Urine Negative Negative mg/dL    Specific Gravity Urine 1.013 1.000 - 1.030    Blood Urine Negative Negative    pH Urine 6.5 5.0 - 9.0    Protein Albumin Urine Negative Negative mg/dL    Urobilinogen Urine Normal Normal, 2.0 mg/dL    Nitrite Urine Negative Negative    Leukocyte Esterase Urine Negative Negative    Narrative    Microscopic not indicated

## 2023-06-15 NOTE — PROGRESS NOTES
SUBJECTIVE:   CC: Bibiana is an 47 year old who presents for preventive health visit.     Healthy Habits:     Getting at least 3 servings of Calcium per day:  NO    Bi-annual eye exam:  NO    Dental care twice a year:  Yes    Sleep apnea or symptoms of sleep apnea:  None    Diet:  Gluten-free/reduced and Other    Frequency of exercise:  None    Taking medications regularly:  Yes    Medication side effects:  None    PHQ-2 Total Score: 0    Here for annual physical.  Due for updated lab work.    Has been noticing a protruding sensation in her vaginal region for greater than 1 year.  Reports when she is standing in the shower she can feels like her vaginal area is more open and that she can feel lumpy tissue.  Has had some associated pain.  Reports a history of uterine prolapse after her first pregnancy.    Contraception: Not sexually active  Risk for STI?: No  Last pap: 12/2021, NIL, other HPV+  Any hx of abnormal paps:  As above, Hx SAM II w/ leep in 2016  Cholesterol/DM concerns/screening: Due  Tobacco?: No  Calcium intake: Dietary  DEXA: Not indicated based on patient age and health status.   Last mammo: 03/2022, negative  Colonoscopy: 12/2019, 10 year follow up  Immunizations: UTD    Have you ever done Advance Care Planning? (For example, a Health Directive, POLST, or a discussion with a medical provider or your loved ones about your wishes): No, advance care planning information given to patient to review.  Patient declined advance care planning discussion at this time.    Social History     Tobacco Use     Smoking status: Never     Passive exposure: Never     Smokeless tobacco: Never   Vaping Use     Vaping status: Never Used     Passive vaping exposure: Yes   Substance Use Topics     Alcohol use: Yes     Comment:  rare       Reviewed orders with patient.  Reviewed health maintenance and updated orders accordingly - Yes      Breast Cancer Screening:  Any new diagnosis of family breast, ovarian, or bowel  cancer? No    FHS-7:       2023     2:08 PM   Breast CA Risk Assessment (FHS-7)   Did any of your first-degree relatives have breast or ovarian cancer? No   Did any of your relatives have bilateral breast cancer? Yes   Did any man in your family have breast cancer? No   Did any woman in your family have breast and ovarian cancer? Yes   Did any woman in your family have breast cancer before age 50 y? Yes   Do you have 2 or more relatives with breast and/or ovarian cancer? No   Do you have 2 or more relatives with breast and/or bowel cancer? Yes       Mammogram Screening: Recommended annual mammography  Pertinent mammograms are reviewed under the imaging tab.    History of abnormal Pap smear: Last 3 Pap and HPV Results:       Latest Ref Rng & Units 12/10/2020     8:22 AM 2020     8:05 AM 2020    11:02 AM   PAP / HPV   PAP (Historical)  NIL    NIL     HPV 16 DNA NEG^Negative Negative   Negative      HPV 18 DNA NEG^Negative Negative   Negative      Other HR HPV NEG^Negative Positive   Positive            Latest Ref Rng & Units 12/10/2020     8:22 AM 2020     8:05 AM 2020    11:02 AM   PAP / HPV   PAP (Historical)  NIL    NIL     HPV 16 DNA NEG^Negative Negative   Negative      HPV 18 DNA NEG^Negative Negative   Negative      Other HR HPV NEG^Negative Positive   Positive        Reviewed and updated as needed this visit by clinical staff   Tobacco  Allergies  Meds      Soc Hx        Reviewed and updated as needed this visit by Provider                 Past Medical History:   Diagnosis Date     SAM II (cervical intraepithelial neoplasia II) 2016    LEEP completed     Irritable bowel syndrome without diarrhea     avoids caramel color; gluten free     Personal history of other medical treatment (CODE)     , both vaginal deliveries.  Prolapsed uterus with first delivery.     Stomach ulcer 2019      Past Surgical History:   Procedure Laterality Date     COLONOSCOPY N/A 2019    F/U  " normal     ENDOSCOPY  2019    Pyloric ulcer     ESOPHAGOSCOPY, GASTROSCOPY, DUODENOSCOPY (EGD), COMBINED N/A 2019    Procedure: ESOPHAGOGASTRODUODENOSCOPY, WITH BIOPSY;  Surgeon: Oli Jackson MD;  Location: [facility] OR     EXTRACTION(S) DENTAL      [place] teeth extraction     LEEP TX, CERVICAL  2016     OTHER SURGICAL HISTORY      10/26/2017,[id number],ANKLE SURGERY,ligamentous repair; Dr. Ren     OB History    Para Term  AB Living   4 2 2 0 2 2   SAB IAB Ectopic Multiple Live Births   0 0 0 0 2      # Outcome Date GA Lbr Jay/2nd Weight Sex Delivery Anes PTL Lv   4 Term 2006     Vag-Spont   TRAV   3 AB 2005 6w6d          2 AB            1 Term      Vag-Spont   TRAV       Review of Systems   Constitutional: Negative for chills and fever.   HENT: Negative for congestion, ear pain, hearing loss and sore throat.    Eyes: Negative for pain and visual disturbance.   Respiratory: Negative for cough and shortness of breath.    Cardiovascular: Negative for chest pain, palpitations and peripheral edema.   Gastrointestinal: Negative for abdominal pain, constipation, diarrhea, heartburn, hematochezia and nausea.   Genitourinary: Negative for dysuria, frequency, genital sores, hematuria and urgency.   Musculoskeletal: Negative for arthralgias, joint swelling and myalgias.   Skin: Negative for rash.   Neurological: Negative for dizziness, headaches and paresthesias.   Psychiatric/Behavioral: Negative for mood changes. The patient is not nervous/anxious.           OBJECTIVE:   /76   Pulse 68   Temp 97.6  F (36.4  C)   Resp 14   Ht 1.676 m (5' 6\")   Wt 70.1 kg (154 lb 9.6 oz)   LMP 2023 (Exact Date)   SpO2 97%   BMI 24.95 kg/m    Physical Exam  GENERAL: healthy, alert and no distress  EYES: Eyes grossly normal to inspection, PERRL and conjunctivae and sclerae normal  HENT: ear canals and TM's normal, nose and mouth without ulcers or lesions  NECK: no adenopathy, no asymmetry, masses, or " scars and thyroid normal to palpation  RESP: lungs clear to auscultation - no rales, rhonchi or wheezes  CV: regular rate and rhythm, normal S1 S2, no S3 or S4, no murmur, click or rub, no peripheral edema and peripheral pulses strong  Genitourinary Exam Female: External genitalia, vulva and vagina are without lesions, masses, or ulcerations. Speculum exam reveals normal appearing cervix. No cervical motion tenderness. Pap smear obtained.   MS: no gross musculoskeletal defects noted, no edema  SKIN: no suspicious lesions or rashes  NEURO: Normal strength and tone, mentation intact and speech normal  PSYCH: mentation appears normal, affect normal/bright      ASSESSMENT/PLAN:       ICD-10-CM    1. Routine history and physical examination of adult  Z00.00 CBC and Differential     Basic Metabolic Panel     CBC and Differential     Basic Metabolic Panel      2. Screening for cervical cancer  Z12.4 Pap Screen with HPV - recommended age 30 - 65 years      3. Lipid screening  Z13.220 Lipid Panel     Lipid Panel      4. Screening for diabetes mellitus  Z13.1 Hemoglobin A1c     Hemoglobin A1c      5. Encounter for screening mammogram for breast cancer  Z12.31 MA Screen Bilateral w/Ba      6. Pelvic pain in female  R10.2 Ob/Gyn Referral        1.  Pap smear updated, mammogram ordered.  Up-to-date on colonoscopy through 2029.  Basic lab work pending as above.  Up-to-date on immunizations.    2.  Pap smear obtained.    3.  Lipid panel pending.  Encourage healthy lifestyle.    4.  A1c pending.  Encourage healthy lifestyle.    5.  Mammogram ordered.    6.  Patient describing symptoms of possible uterine prolapse versus rectocele or cystocele.  Exam largely unrevealing.  Referral to OB/GYN for additional evaluation.    Patient has been advised of split billing requirements and indicates understanding: Yes      COUNSELING:  Reviewed preventive health counseling, as reflected in patient instructions        She reports that she has  never smoked. She has never been exposed to tobacco smoke. She has never used smokeless tobacco.      Maggei Mariee PA-C  [facility] AND [facility]

## 2023-06-16 ENCOUNTER — OFFICE VISIT (OUTPATIENT)
Dept: FAMILY MEDICINE | Facility: OTHER | Age: 48
End: 2023-06-16
Attending: PHYSICIAN ASSISTANT
Payer: COMMERCIAL

## 2023-06-16 VITALS
OXYGEN SATURATION: 97 % | WEIGHT: 154.6 LBS | BODY MASS INDEX: 24.85 KG/M2 | HEART RATE: 68 BPM | SYSTOLIC BLOOD PRESSURE: 138 MMHG | DIASTOLIC BLOOD PRESSURE: 76 MMHG | HEIGHT: 66 IN | TEMPERATURE: 97.6 F | RESPIRATION RATE: 14 BRPM

## 2023-06-16 DIAGNOSIS — Z13.220 LIPID SCREENING: ICD-10-CM

## 2023-06-16 DIAGNOSIS — Z00.00 ROUTINE HISTORY AND PHYSICAL EXAMINATION OF ADULT: Primary | ICD-10-CM

## 2023-06-16 DIAGNOSIS — Z12.4 SCREENING FOR CERVICAL CANCER: ICD-10-CM

## 2023-06-16 DIAGNOSIS — R10.2 PELVIC PAIN IN FEMALE: ICD-10-CM

## 2023-06-16 DIAGNOSIS — Z13.1 SCREENING FOR DIABETES MELLITUS: ICD-10-CM

## 2023-06-16 DIAGNOSIS — Z12.31 ENCOUNTER FOR SCREENING MAMMOGRAM FOR BREAST CANCER: ICD-10-CM

## 2023-06-16 LAB
ANION GAP SERPL CALCULATED.3IONS-SCNC: 10 MMOL/L (ref 7–15)
BASOPHILS # BLD AUTO: 0 10E3/UL (ref 0–0.2)
BASOPHILS NFR BLD AUTO: 1 %
BUN SERPL-MCNC: 11.9 MG/DL (ref 6–20)
CALCIUM SERPL-MCNC: 9.4 MG/DL (ref 8.6–10)
CHLORIDE SERPL-SCNC: 105 MMOL/L (ref 98–107)
CHOLEST SERPL-MCNC: 192 MG/DL
CREAT SERPL-MCNC: 0.61 MG/DL (ref 0.51–0.95)
DEPRECATED HCO3 PLAS-SCNC: 27 MMOL/L (ref 22–29)
EOSINOPHIL # BLD AUTO: 0.1 10E3/UL (ref 0–0.7)
EOSINOPHIL NFR BLD AUTO: 1 %
ERYTHROCYTE [DISTWIDTH] IN BLOOD BY AUTOMATED COUNT: 12.1 % (ref 10–15)
GFR SERPL CREATININE-BSD FRML MDRD: >90 ML/MIN/1.73M2
GLUCOSE SERPL-MCNC: 105 MG/DL (ref 70–99)
HBA1C MFR BLD: 5.3 % (ref 4–6.2)
HCT VFR BLD AUTO: 39.1 % (ref 35–47)
HDLC SERPL-MCNC: 54 MG/DL
HGB BLD-MCNC: 13.3 G/DL (ref 11.7–15.7)
IMM GRANULOCYTES # BLD: 0 10E3/UL
IMM GRANULOCYTES NFR BLD: 0 %
LDLC SERPL CALC-MCNC: 108 MG/DL
LYMPHOCYTES # BLD AUTO: 2.7 10E3/UL (ref 0.8–5.3)
LYMPHOCYTES NFR BLD AUTO: 45 %
MCH RBC QN AUTO: 31.8 PG (ref 26.5–33)
MCHC RBC AUTO-ENTMCNC: 34 G/DL (ref 31.5–36.5)
MCV RBC AUTO: 94 FL (ref 78–100)
MONOCYTES # BLD AUTO: 0.4 10E3/UL (ref 0–1.3)
MONOCYTES NFR BLD AUTO: 7 %
NEUTROPHILS # BLD AUTO: 2.8 10E3/UL (ref 1.6–8.3)
NEUTROPHILS NFR BLD AUTO: 46 %
NONHDLC SERPL-MCNC: 138 MG/DL
NRBC # BLD AUTO: 0 10E3/UL
NRBC BLD AUTO-RTO: 0 /100
PLATELET # BLD AUTO: 266 10E3/UL (ref 150–450)
POTASSIUM SERPL-SCNC: 4.2 MMOL/L (ref 3.4–5.3)
RBC # BLD AUTO: 4.18 10E6/UL (ref 3.8–5.2)
SODIUM SERPL-SCNC: 142 MMOL/L (ref 136–145)
TRIGL SERPL-MCNC: 152 MG/DL
WBC # BLD AUTO: 6 10E3/UL (ref 4–11)

## 2023-06-16 PROCEDURE — 87624 HPV HI-RISK TYP POOLED RSLT: CPT | Mod: ZL | Performed by: PHYSICIAN ASSISTANT

## 2023-06-16 PROCEDURE — 83036 HEMOGLOBIN GLYCOSYLATED A1C: CPT | Mod: ZL | Performed by: PHYSICIAN ASSISTANT

## 2023-06-16 PROCEDURE — 80061 LIPID PANEL: CPT | Mod: ZL | Performed by: PHYSICIAN ASSISTANT

## 2023-06-16 PROCEDURE — 36415 COLL VENOUS BLD VENIPUNCTURE: CPT | Mod: ZL | Performed by: PHYSICIAN ASSISTANT

## 2023-06-16 PROCEDURE — 85025 COMPLETE CBC W/AUTO DIFF WBC: CPT | Mod: ZL | Performed by: PHYSICIAN ASSISTANT

## 2023-06-16 PROCEDURE — 80048 BASIC METABOLIC PNL TOTAL CA: CPT | Mod: ZL | Performed by: PHYSICIAN ASSISTANT

## 2023-06-16 PROCEDURE — 99212 OFFICE O/P EST SF 10 MIN: CPT | Mod: 25 | Performed by: PHYSICIAN ASSISTANT

## 2023-06-16 PROCEDURE — G0123 SCREEN CERV/VAG THIN LAYER: HCPCS | Performed by: PHYSICIAN ASSISTANT

## 2023-06-16 PROCEDURE — 99396 PREV VISIT EST AGE 40-64: CPT | Performed by: PHYSICIAN ASSISTANT

## 2023-06-16 ASSESSMENT — ENCOUNTER SYMPTOMS
JOINT SWELLING: 0
DIZZINESS: 0
COUGH: 0
NAUSEA: 0
DYSURIA: 0
PALPITATIONS: 0
FEVER: 0
FREQUENCY: 0
HEADACHES: 0
ARTHRALGIAS: 0
NERVOUS/ANXIOUS: 0
SORE THROAT: 0
ABDOMINAL PAIN: 0
HEARTBURN: 0
PARESTHESIAS: 0
CHILLS: 0
DIARRHEA: 0
HEMATOCHEZIA: 0
EYE PAIN: 0
SHORTNESS OF BREATH: 0
HEMATURIA: 0
MYALGIAS: 0
CONSTIPATION: 0

## 2023-06-16 ASSESSMENT — PAIN SCALES - GENERAL: PAINLEVEL: NO PAIN (0)

## 2023-06-16 NOTE — NURSING NOTE
Patient presents to clinic for annual physical.  Marjorie Perkins LPN ....................  6/16/2023   2:02 PM  EXT [id number]

## 2023-06-22 LAB
BKR LAB AP GYN ADEQUACY: NORMAL
BKR LAB AP GYN INTERPRETATION: NORMAL
BKR LAB AP HPV REFLEX: NORMAL
BKR LAB AP PREVIOUS ABNL DX: NORMAL
BKR LAB AP PREVIOUS ABNORMAL: NORMAL
PATH REPORT.COMMENTS IMP SPEC: NORMAL
PATH REPORT.COMMENTS IMP SPEC: NORMAL
PATH REPORT.RELEVANT HX SPEC: NORMAL

## 2023-06-27 LAB
HUMAN PAPILLOMA VIRUS 16 DNA: NEGATIVE
HUMAN PAPILLOMA VIRUS 18 DNA: NEGATIVE
HUMAN PAPILLOMA VIRUS FINAL DIAGNOSIS: NORMAL
HUMAN PAPILLOMA VIRUS OTHER HR: NEGATIVE

## 2023-07-21 ENCOUNTER — HOSPITAL ENCOUNTER (OUTPATIENT)
Dept: MAMMOGRAPHY | Facility: OTHER | Age: 48
Discharge: HOME | End: 2023-07-21
Attending: PHYSICIAN ASSISTANT | Admitting: PHYSICIAN ASSISTANT
Payer: COMMERCIAL

## 2023-07-21 DIAGNOSIS — Z12.31 ENCOUNTER FOR SCREENING MAMMOGRAM FOR BREAST CANCER: ICD-10-CM

## 2023-07-21 PROCEDURE — 77067 SCR MAMMO BI INCL CAD: CPT

## 2023-08-01 ENCOUNTER — OFFICE VISIT (OUTPATIENT)
Dept: OBGYN | Facility: OTHER | Age: 48
End: 2023-08-01
Attending: PHYSICIAN ASSISTANT
Payer: COMMERCIAL

## 2023-08-01 VITALS
HEART RATE: 76 BPM | WEIGHT: 154.4 LBS | DIASTOLIC BLOOD PRESSURE: 74 MMHG | SYSTOLIC BLOOD PRESSURE: 122 MMHG | BODY MASS INDEX: 24.92 KG/M2

## 2023-08-01 DIAGNOSIS — N39.3 STRESS INCONTINENCE DUE TO PELVIC ORGAN PROLAPSE: ICD-10-CM

## 2023-08-01 DIAGNOSIS — R10.2 PELVIC PAIN IN FEMALE: ICD-10-CM

## 2023-08-01 DIAGNOSIS — N81.10 BADEN-WALKER GRADE 3 CYSTOCELE: Primary | ICD-10-CM

## 2023-08-01 PROCEDURE — 99204 OFFICE O/P NEW MOD 45 MIN: CPT | Performed by: STUDENT IN AN ORGANIZED HEALTH CARE EDUCATION/TRAINING PROGRAM

## 2023-08-01 NOTE — PROGRESS NOTES
"Gynecology Office Visit    Chief Complaint: Pelvic organ prolpase    HPI:    Bibiana Lira is a 47 year old , here for concerns of pelvic organ prolapse. She notes that she first noticed these symptoms for many years. She can feel a bulge when she showers through the labial lips. Typically it is worse on the left side. The pain is a \"pulling rope\" sensation    She notes the pain is about 5-6/10. It hurts with long periods of sitting or daily activities.    OBHx  OB History    Para Term  AB Living   4 2 2 0 2 2   SAB IAB Ectopic Multiple Live Births   0 0 0 0 2      # Outcome Date GA Lbr Jay/2nd Weight Sex Delivery Anes PTL Lv   4 Term 2006     Vag-Spont   TRAV   3 AB  6w6d          2 AB            1 Term      Vag-Spont   TRAV    x2    GYN history:     Last pap smear 2023: NIL, HPV negative. No history of abnormal pap smears  Last menstrual period in 2023, some light spotting occassionally.     Past medical history:  Past Medical History:   Diagnosis Date    SAM II (cervical intraepithelial neoplasia II)     LEEP completed    Irritable bowel syndrome without diarrhea     avoids caramel color; gluten free    Personal history of other medical treatment (CODE)     , both vaginal deliveries.  Prolapsed uterus with first delivery.    Stomach ulcer 2019       Past Surgical History:  Past Surgical History:   Procedure Laterality Date    COLONOSCOPY N/A 2019    F/U  normal    ENDOSCOPY  2019    Pyloric ulcer    ESOPHAGOSCOPY, GASTROSCOPY, DUODENOSCOPY (EGD), COMBINED N/A 2019    Procedure: ESOPHAGOGASTRODUODENOSCOPY, WITH BIOPSY;  Surgeon: Oli Jackson MD;  Location: [facility] OR    EXTRACTION(S) DENTAL      [place] teeth extraction    LEEP TX, CERVICAL  2016    OTHER SURGICAL HISTORY      10/26/2017,[id number],ANKLE SURGERY,ligamentous repair; Dr. Ren       Medications:  No current outpatient medications on file.     No current facility-administered " "medications for this visit.     Allergies:       Allergies   Allergen Reactions    Gluten Meal GI Disturbance     \"flu like symptoms\"    [place] Oil Headache     migraine    Sulfa Antibiotics      Verified in Meditech: Y, Severity in Meditech: S  Other reaction(s): Erythema    Tilactase GI Disturbance     AND fatigue    Sulfadiazine Rash       Social History:  Social History     Tobacco Use    Smoking status: Never     Passive exposure: Never    Smokeless tobacco: Never   Vaping Use    Vaping Use: Never used   Substance Use Topics    Alcohol use: Yes     Comment:  rare    Drug use: No       Family History:  Family History   Problem Relation Age of Onset    Diabetes Mother         Diabetes    Hypertension Mother         Hypertension    Heart Disease Mother         Heart Disease,pacemaker    Other - See Comments Mother         Stoke x 2    Hyperlipidemia Mother         Hyperlipidemia    Cerebrovascular Disease Mother     Diabetes Type 2  Mother     Other - See Comments Father         Actinic keratoses    Hypertension Father         Hypertension    Hyperlipidemia Father         Hyperlipidemia    Diabetes Type 2  Father         Diabetes type II    Family History Negative Brother         Good Health    Other - See Comments Sister         Gluten intolerance     ROS:   Skin: negative for rash, bruising  Eyes: negative for visual blurring, double vision  Ears/Nose/Throat: negative for nasal congestion, vertigo  Respiratory: No shortness of breath, dyspnea on exertion, cough, or hemoptysis  Cardiovascular: negative for palpitations, chest pain, lower extremity edema and syncope or near-syncope  Gastrointestinal: negative for, nausea, vomiting and hematemesis  Genitourinary: negative for, dysuria, + stress incontinence, + frequency of urination, + prolapse pressure and pain.  Musculoskeletal: negative for, back pain and muscular weakness  Neurologic: negative for, headaches, syncope, seizures and local weakness  Psychiatric: " negative for, anxiety, depression and hallucinations  Hematologic/Lymphatic/Immunologic: negative for, anemia, chills and fever    Physical Exam  /74   Pulse 76   Wt 70 kg (154 lb 6.4 oz)   BMI 24.92 kg/m    Gen: Well-appearing, no acute distressed, well-groomed, alert  HEENT: Normocephalic, atraumatic  Cardiovascular: Regular rate,No peripheral edema, normal peripheral circulation  Pulm: lungs clear to auscultation without crackles or wheezing  Abd: Soft, non-tender, non-distended  Ext: No LE edema, extremities warm and well perfused  Pelvic:  Normal appearing external female genitalia. Normal hair distribution. Grade 2 cystocele at rest, Grade 3 with valsalva. Grade 2 uterine prolapse, Grade 2 rectocele. Vagina is without lesions with moist, pink ruggae. There is no vaginal discharge. Cervix parous, no lesions, no cervical motion tenderness. Uterus is approximately 5 cm, mobile, non-tender. No adnexal tenderness or masses    Assessment/Plan:  Ms. Bibiana Lira is a 47 year old yo  who presents to office today with pelvic organ prolapse: Grade 2 uterine prolapse, Grade 3 cystocele, Grade 2 rectocele    # Pelvic Organ prolapse: Grade 3 cystocele, Grade 2 Uterine prolapse, Grade 2 Rectocele  - Offered both surgical and non-surgical management, declines pessary at this time  - Discussed all surgical approaches including native tissue repair vs. Sacrocolpopexy-- as she is young and very active, most interested in sacrocolpopexy with mid-urethral sling for stress incontinence.   Referral for urogynecology team at [facility] placed today. We discussed that their team may have a different approach and may recommend a different type of surgical technique as well.      # Stress incontinence       We discussed that pelvic organ prolpase can be treated in two manners: conservatively with the placement of a pessary vs. Definitive management with surgical correction. A pessary would involve a pessary fitting and  maintenance with removing the pessary periodically for cleaning (either take it out nightly on their own and replace in the morning vs. Office visits for a pessary check q 1 month initially to ensure the vaginal tissue is not getting irritated and then can space out to q 3 months  likely). The pessary will not fix the underlying problem, but can significantly help reduce prolapse symptoms. Alternatively, the surgical procedure is an option as well. We discussed the procedure in detail including the anticipated recovery time and limitations to lifting weights, engaging her core, and pelvic rest with the surgical recovery. We discussed the risks associated with hysterectomy including but not limited to bleeding, infection and injury to surrounding organs. We discussed the rare possibility of needing a blood transfusion and she consented to blood products if needed. We also discussed the small risk of injury to the bladder,  Ureters or bowel requiring repair at the time of surgery.     We also discussed the mid-urethral sling. She endorses a significant history of urinary leaking with coughing, lifting heavy things and sneezing.  We discussed that the mesh is a permanent structure that is meant to provide extra support under the urethra to reduce leaking with those activities. We emphasized the importance of allowing appropriate healing and reducing infection risk with this sling in place. We talked about the risks including injury to the urethra or the potential for needing it to be loosened if it is difficult to empty her bladder.    We also discussed that occasionally after prolapse repair this can unmask a stress urinary incontinence problem.  With diagrams to demonstrate how gravity can be playing a role at potentially reducing or limiting any stress incontinence.        Total amount of time spent during today's encounter including chart prep, face to face, exam, discussion and documentation was 45  minutes    Nataly Miller MD on 8/2/2023 at 12:36 PM

## 2023-08-01 NOTE — NURSING NOTE
Pt presents to clinic today for consult on pelvic pain and prolapse.      Medication Reconciliation: complete  Shaye Ambrose LPN

## 2023-08-02 ENCOUNTER — TRANSCRIBE ORDERS (OUTPATIENT)
Dept: OTHER | Age: 48
End: 2023-08-02

## 2023-08-02 DIAGNOSIS — N81.10 BADEN-WALKER GRADE 3 CYSTOCELE: Primary | ICD-10-CM

## 2023-09-15 ENCOUNTER — TELEPHONE (OUTPATIENT)
Dept: UROLOGY | Facility: CLINIC | Age: 48
End: 2023-09-15
Payer: COMMERCIAL

## 2023-09-15 DIAGNOSIS — N81.11 CYSTOCELE, MIDLINE: Primary | ICD-10-CM

## 2023-09-15 NOTE — TELEPHONE ENCOUNTER
Please place UA/UC order for upcoming appointment with Annie Merchant.    Thank you,    Dominique [facility] Visit Facilitator- Surgical Specialties

## 2023-09-18 ENCOUNTER — OFFICE VISIT (OUTPATIENT)
Dept: UROLOGY | Facility: CLINIC | Age: 48
End: 2023-09-18
Payer: COMMERCIAL

## 2023-09-18 ENCOUNTER — LAB (OUTPATIENT)
Dept: LAB | Facility: CLINIC | Age: 48
End: 2023-09-18
Payer: COMMERCIAL

## 2023-09-18 DIAGNOSIS — N81.11 CYSTOCELE, MIDLINE: ICD-10-CM

## 2023-09-18 DIAGNOSIS — N39.46 MIXED INCONTINENCE: ICD-10-CM

## 2023-09-18 DIAGNOSIS — N81.11 CYSTOCELE, MIDLINE: Primary | ICD-10-CM

## 2023-09-18 DIAGNOSIS — R39.15 URINARY URGENCY: ICD-10-CM

## 2023-09-18 DIAGNOSIS — N81.4 UTEROVAGINAL PROLAPSE: ICD-10-CM

## 2023-09-18 DIAGNOSIS — R35.0 URINARY FREQUENCY: ICD-10-CM

## 2023-09-18 DIAGNOSIS — N81.6 RECTOCELE: ICD-10-CM

## 2023-09-18 DIAGNOSIS — K59.00 CONSTIPATION, UNSPECIFIED CONSTIPATION TYPE: ICD-10-CM

## 2023-09-18 LAB
ALBUMIN UR-MCNC: NEGATIVE MG/DL
APPEARANCE UR: CLEAR
BILIRUB UR QL STRIP: NEGATIVE
COLOR UR AUTO: YELLOW
GLUCOSE UR STRIP-MCNC: NEGATIVE MG/DL
HGB UR QL STRIP: NEGATIVE
KETONES UR STRIP-MCNC: NEGATIVE MG/DL
LEUKOCYTE ESTERASE UR QL STRIP: NEGATIVE
NITRATE UR QL: NEGATIVE
PH UR STRIP: 5.5 [PH] (ref 5–7)
SKIP: NORMAL
SP GR UR STRIP: 1.03 (ref 1–1.03)
UROBILINOGEN UR STRIP-MCNC: NORMAL MG/DL

## 2023-09-18 PROCEDURE — 81003 URINALYSIS AUTO W/O SCOPE: CPT

## 2023-09-18 PROCEDURE — 99204 OFFICE O/P NEW MOD 45 MIN: CPT | Mod: 25 | Performed by: PHYSICIAN ASSISTANT

## 2023-09-18 PROCEDURE — 51798 US URINE CAPACITY MEASURE: CPT | Performed by: PHYSICIAN ASSISTANT

## 2023-09-18 NOTE — PROGRESS NOTES
Urology Clinic    Name: Bibiana Lira    MRN: [id number]   YOB: 1975  Accompanied at today's visit by: sister (bisi)               Assessment and Plan:   47 year old female with symptomatic pelvic organ prolapse, MICHELLE, urinary urgency/frequency, constipation.     - PVR WNL  - UA negative  - Educated patient about POP. Discussed treatment options for POP which include continuing to monitor, PFPT, pessary management and prolapse repair surgery. Discussed different surgical options. Patient is interested in sacrocolpopexy with hysterectomy and suburethral sling procedure.   - Did not see HELGA on exam today, however patient is very bothered by her HELGA. Discussed anti-incontinent procedures. Will have patient further discuss with Dr. Kennedy about possible anti-incontinence procedure at time of prolapse repair surgery.   - Avoid bladder irritants.   - Discussed with patient her UUI, urinary urgency/frequency may persist after surgery. Continue to monitor UUI symptoms as patient would like HELGA and prolapse taken care of first with surgery. Consider OAB medication in the future vs PFPT if UUI persists after surgery.   - Follow-up with Dr. Kennedy to further discuss prolapse repair and possible anti-incontinence surgery.   - Dicussed use of estrogen cream following surgery.   - Discussed how constipation can affect prolapse and will start bowel regimen following surgery.     Orders Placed This Encounter   Procedures    MEASURE POST-VOID RESIDUAL URINE/BLADDER CAPACITY, US NON-IMAGING     After discussing the assessment and plan with patient, patient verbalized understanding and agreed to the above plan. All questions answered.     42 minutes were spent today on the date of the encounter in reviewing the EMR, direct patient care, coordination of care and documentation in addition to exam.     Annie Merchant PA-C  September 18, 2023    Patient Care Team:  Margoth Ramachandran DO as PCP - General (Family  Practice)  Maggie Mariee PA-C as Assigned PCP  Annie Merchant PA-C as Physician Assistant  Nataly Miller MD as Assigned OBGYN Provider  SELF, REFERRED          Chief Complaint:   Pelvic organ prolapse          History of Present Illness:   2023    HISTORY: Bibiana Lira is a 47 year old  (vaginal deliveries) female as a new consultation for concerns of pelvic organ prolapse for several years. Recently seen by OB/GYN on 23 for pelvic organ prolapse and on exam noted a grade 3 cystocele, grade 2 rectocele and grade 2 uterine prolapse. She can feel a bulge when she showers along the labia and bothers her on a daily basis. States she no longer bike rides or does out door/physical activities like she use to due to discomfort from the vaginal bulge. Was offered both surgical and non-surgical management as she declined pessary by OB/GYN. She was interested in hysterectomy and sacrocolpopexy along with midurethral sling for HELGA so was referred to our team. Patient reports voiding every 2 hours during  the day and 0-1x/night. Describes MICHELLE; both equally bothersome. Does not wear pads. Will change her underwear occasionally due to accidents. Feels like they empty completely after voiding. Drinks 2-4 cups of coffee per day. Denies gross hematuria or history of kidney stones. Denies history of recurrent UTIs. Not sexually active currently, but may be in the future. Describes constipation. Denies splinting to defecate or urinate. PMH is significant for migraine, bilateral sensorineural hearing loss. PSH includes ankle surgery. Last pap smear 2023 NILM and HPV negative. Last menstrual cycle was 2023. Denies history of smoking.  Is a nurse. Patient voices no other concerns at this time.          Past Medical History:     Past Medical History:   Diagnosis Date    SAM II (cervical intraepithelial neoplasia II) 2016    LEEP completed    Irritable bowel syndrome without diarrhea     avoids  "caramel color; gluten free    Personal history of other medical treatment (CODE)     , both vaginal deliveries.  Prolapsed uterus with first delivery.    Stomach ulcer 2019            Past Surgical History:     Past Surgical History:   Procedure Laterality Date    COLONOSCOPY N/A 2019    F/U  normal    ENDOSCOPY  2019    Pyloric ulcer    ESOPHAGOSCOPY, GASTROSCOPY, DUODENOSCOPY (EGD), COMBINED N/A 2019    Procedure: ESOPHAGOGASTRODUODENOSCOPY, WITH BIOPSY;  Surgeon: Oli Jackson MD;  Location: [facility] OR    EXTRACTION(S) DENTAL      [place] teeth extraction    LEEP TX, CERVICAL  2016    OTHER SURGICAL HISTORY      10/26/2017,[id number],ANKLE SURGERY,ligamentous repair; Dr. Ren            Social History:     Social History     Tobacco Use    Smoking status: Never     Passive exposure: Never    Smokeless tobacco: Never   Substance Use Topics    Alcohol use: Yes     Comment:  rare            Family History:     Family History   Problem Relation Age of Onset    Diabetes Mother         Diabetes    Hypertension Mother         Hypertension    Heart Disease Mother         Heart Disease,pacemaker    Other - See Comments Mother         Stoke x 2    Hyperlipidemia Mother         Hyperlipidemia    Cerebrovascular Disease Mother     Diabetes Type 2  Mother     Other - See Comments Father         Actinic keratoses    Hypertension Father         Hypertension    Hyperlipidemia Father         Hyperlipidemia    Diabetes Type 2  Father         Diabetes type II    Family History Negative Brother         Good Health    Other - See Comments Sister         Gluten intolerance              Allergies:     Allergies   Allergen Reactions    Gluten Meal GI Disturbance     \"flu like symptoms\"    [place] Oil Headache     migraine    Sulfa Antibiotics      Verified in [employer]: Y, Severity in [employer]: S  Other reaction(s): Erythema    Tilactase GI Disturbance     AND fatigue    Sulfadiazine Rash            Medications: "     No current outpatient medications on file.     No current facility-administered medications for this visit.             Review of Systems:    ROS: 14 point ROS neg other than the symptoms noted above in the HPI.          Physical Exam:   not currently breastfeeding.  Data Unavailable, There is no height or weight on file to calculate BMI., 0 lbs 0 oz  Gen appearance: Age-appropriate appearing female in NAD.   HEENT:  EOMI, conjunctiva clear/white. Normal ROM of neck for age.   Psych:  alert , In no acute distress.  Neuro:  A&Ox3  Skin:  Clear of obvious rashes, ecchymoses.  Resp:  Normal respiratory effort; not in acute respiratory distress.   Vasc:  Regular rate.  lymph:  No obvious LE edema bilaterally.     exam:  Vulva is normal in appearance. Urethra normal.. Negative for HELGA with reduction of speculum when instructed to cough though does have mobility to the urethra. Some pain to palpation along left levator ani muscle. No obvious abnormalities to cervix. Cystocele grade 4 (protrudes about 1cm out from introitus). Rectocele grade 3. When bares down see bulging/laxity of the perineum. Uterovaginal prolapse grade 2. Strength 4/5. No obvious masses, lesions, ulcers, bleeding noted on internal or external exam.          Data:    PVR  58mL    Labs:  Creatinine   Date Value Ref Range Status   06/16/2023 0.61 0.51 - 0.95 mg/dL Final   12/10/2020 0.67 0.60 - 1.20 mg/dL Final       UA RESULTS:  Recent Labs   Lab Test 09/18/23  1337   COLOR Yellow   APPEARANCE Clear   URINEGLC Negative   URINEBILI Negative   URINEKETONE Negative   SG 1.035   UBLD Negative   URINEPH 5.5   PROTEIN Negative   NITRITE Negative   LEUKEST Negative       Lab on 09/18/2023   Component Date Value Ref Range Status    Color Urine 09/18/2023 Yellow  Colorless, Straw, Light Yellow, Yellow Final    Appearance Urine 09/18/2023 Clear  Clear Final    Glucose Urine 09/18/2023 Negative  Negative mg/dL Final    Bilirubin Urine 09/18/2023 Negative   Negative Final    Ketones Urine 09/18/2023 Negative  Negative mg/dL Final    Specific Gravity Urine 09/18/2023 1.035  0.999 - 1.035 Final    Blood Urine 09/18/2023 Negative  Negative Final    pH Urine 09/18/2023 5.5  5.0 - 7.0 Final    Protein Albumin Urine 09/18/2023 Negative  Negative mg/dL Final    Urobilinogen Urine 09/18/2023 Normal  Normal, 2.0 mg/dL Final    Nitrite Urine 09/18/2023 Negative  Negative Final    Leukocyte Esterase Urine 09/18/2023 Negative  Negative Final

## 2023-09-18 NOTE — NURSING NOTE
Bibiana Lira's goals for this visit include:   Chief Complaint   Patient presents with    New Patient       She requests these members of her care team be copied on today's visit information:       PCP: Margoth Ramachandran    Referring Provider:  Referred Self, MD  No address on file    post void residual: 58 ml    Do you need any medication refills at today's visit?     Fern Mccallum LPN on 9/18/2023 at 1:44 PM

## 2023-09-18 NOTE — PATIENT INSTRUCTIONS
Follow-up with Dr Kennedy to discuss surgery    Below is a list of things that can irritate the bladder and should be eliminated as can cause urge incontinence, urgency/freqeuncy:  Caffeinated soft drinks.  Coffee.  Tea.  Chocolate.  Tomato-based foods.  Acidic juices and fruits. (includes cranberry juice)  Alcohol.  Nicotine  Carbonated drinks.  Aspartame/Nutrasweet.    ________________________________

## 2023-10-02 ENCOUNTER — VIRTUAL VISIT (OUTPATIENT)
Dept: UROLOGY | Facility: CLINIC | Age: 48
End: 2023-10-02
Payer: COMMERCIAL

## 2023-10-02 DIAGNOSIS — N39.3 STRESS INCONTINENCE: ICD-10-CM

## 2023-10-02 DIAGNOSIS — N81.6 RECTOCELE: ICD-10-CM

## 2023-10-02 DIAGNOSIS — N81.11 MIDLINE CYSTOCELE: Primary | ICD-10-CM

## 2023-10-02 PROCEDURE — 99214 OFFICE O/P EST MOD 30 MIN: CPT | Mod: 95 | Performed by: UROLOGY

## 2023-10-02 NOTE — PROGRESS NOTES
HPI:  Bibiana Lira is a 48 year old female with significant pelvic organ prolapse and stress urinary incontinence.    Reviewed previous notes from Bettye Merchant from 9/18/23, Per her note:     exam:  Vulva is normal in appearance. Urethra normal.. Negative for HELGA with reduction of speculum when instructed to cough though does have mobility to the urethra. Some pain to palpation along left levator ani muscle. No obvious abnormalities to cervix. Cystocele grade 4 (protrudes about 1cm out from introitus). Rectocele grade 3. When bares down see bulging/laxity of the perineum. Uterovaginal prolapse grade 2. Strength 4/5. No obvious masses, lesions, ulcers, bleeding noted on internal or external exam.     Exam:  There were no vitals taken for this visit.  GENERAL: Healthy, alert and no distress  EYES: Eyes grossly normal to inspection.  No discharge or erythema, or obvious scleral/conjunctival abnormalities.  RESP: No audible wheeze, cough, or visible cyanosis.  No visible retractions or increased work of breathing.    SKIN: Visible skin clear. No significant rash, abnormal pigmentation or lesions.  NEURO: Cranial nerves grossly intact.  Mentation and speech appropriate for age.  PSYCH: Mentation appears normal, affect normal/bright, judgement and insight intact, normal speech and appearance well-groomed.          Review of Labs:  The following labs were reviewed by me and discussed with the patient:  Lab Results   Component Value Date    WBC 6.0 06/16/2023    WBC 6.3 12/10/2020     Lab Results   Component Value Date    RBC 4.18 06/16/2023    RBC 4.19 12/10/2020     Lab Results   Component Value Date    HGB 13.3 06/16/2023    HGB 12.9 12/10/2020     Lab Results   Component Value Date    HCT 39.1 06/16/2023    HCT 38.5 12/10/2020     Lab Results   Component Value Date    MCV 94 06/16/2023    MCV 92 12/10/2020     Lab Results   Component Value Date    MCH 31.8 06/16/2023    MCH 30.8 12/10/2020     Lab Results    Component Value Date    MCHC 34.0 06/16/2023    MCHC 33.5 12/10/2020     Lab Results   Component Value Date    RDW 12.1 06/16/2023    RDW 12.1 12/10/2020     Lab Results   Component Value Date     06/16/2023     12/10/2020        Last Comprehensive Metabolic Panel:  Sodium   Date Value Ref Range Status   06/16/2023 142 136 - 145 mmol/L Final   12/10/2020 137 134 - 144 mmol/L Final     Potassium   Date Value Ref Range Status   06/16/2023 4.2 3.4 - 5.3 mmol/L Final   12/10/2020 3.6 3.5 - 5.1 mmol/L Final     Chloride   Date Value Ref Range Status   06/16/2023 105 98 - 107 mmol/L Final   12/10/2020 104 98 - 107 mmol/L Final     Carbon Dioxide   Date Value Ref Range Status   12/10/2020 25 21 - 31 mmol/L Final     Carbon Dioxide (CO2)   Date Value Ref Range Status   06/16/2023 27 22 - 29 mmol/L Final     Anion Gap   Date Value Ref Range Status   06/16/2023 10 7 - 15 mmol/L Final   12/10/2020 8 3 - 14 mmol/L Final     Glucose   Date Value Ref Range Status   06/16/2023 105 (H) 70 - 99 mg/dL Final   12/10/2020 100 70 - 105 mg/dL Final     Urea Nitrogen   Date Value Ref Range Status   06/16/2023 11.9 6.0 - 20.0 mg/dL Final   12/10/2020 9 7 - 25 mg/dL Final     Creatinine   Date Value Ref Range Status   06/16/2023 0.61 0.51 - 0.95 mg/dL Final   12/10/2020 0.67 0.60 - 1.20 mg/dL Final     GFR Estimate   Date Value Ref Range Status   06/16/2023 >90 >60 mL/min/1.73m2 Final     Comment:     eGFR calculated using 2021 CKD-EPI equation.   12/10/2020 >90 >60 mL/min/[1.73_m2] Final     Calcium   Date Value Ref Range Status   06/16/2023 9.4 8.6 - 10.0 mg/dL Final   12/10/2020 9.2 8.6 - 10.3 mg/dL Final     Bilirubin Total   Date Value Ref Range Status   12/10/2020 0.5 0.3 - 1.0 mg/dL Final     Alkaline Phosphatase   Date Value Ref Range Status   12/10/2020 69 34 - 104 U/L Final     ALT   Date Value Ref Range Status   12/10/2020 16 7 - 52 U/L Final     AST   Date Value Ref Range Status   12/10/2020 18 13 - 39 U/L Final                 Color Urine (no units)   Date Value   09/18/2023 Yellow     Appearance Urine (no units)   Date Value   09/18/2023 Clear     Glucose Urine (mg/dL)   Date Value   09/18/2023 Negative     Bilirubin Urine (no units)   Date Value   09/18/2023 Negative     Ketones Urine (mg/dL)   Date Value   09/18/2023 Negative     Specific Gravity Urine (no units)   Date Value   09/18/2023 1.035     pH Urine (no units)   Date Value   09/18/2023 5.5     Protein Albumin Urine (mg/dL)   Date Value   09/18/2023 Negative     Nitrite Urine (no units)   Date Value   09/18/2023 Negative     Leukocyte Esterase Urine (no units)   Date Value   09/18/2023 Negative          Assessment & Plan   49 y/o female with grade III and IV POP as well as stress urinary incontinence.  We discussed options for her prolapse.  She is most interested in robotic sacrocolpoxey and sling.  Will arrange for her to see Dr. Raman to discuss a hysterectomy.  -schedule apt. With Dr. Raman to discuss surgery  -schedule robotic sacrocolpopexy and sling with Dr. Raman for hysterectomy.  Pt would like to have this done before the end of the year.    Ebenezer Kennedy MD  [facility]      ==========================      Additional Coding Information:    Time spent:  36 minutes spent on the date of the encounter doing chart review, history and exam, documentation and further activities per the note

## 2023-10-02 NOTE — NURSING NOTE
Is the patient currently in the state of [place]? YES    Visit mode:VIDEO    If the visit is dropped, the patient can be reconnected by: VIDEO VISIT: Text to cell phone:   Telephone Information:   Mobile [phone]       Will anyone else be joining the visit? NO  (If patient encounters technical issues they should call [phone]    How would you like to obtain your AVS? MyChart    Are changes needed to the allergy or medication list? Yes Pt states taking Tumeric, Vitamin D, Mg, Fish oil, Collegan, Biotin.     Reason for visit: KARENA LEWISF

## 2023-10-02 NOTE — PATIENT INSTRUCTIONS
-schedule apt. With Dr. Raman to discuss surgery  -schedule robotic sacrocolpopexy and sling with Dr. Raman for hysterectomy.  Pt would like to have this done before the end of the year.

## 2023-10-02 NOTE — PROGRESS NOTES
Virtual Visit Details    Type of service:  Video Visit     Originating Location (pt. Location): Home    Distant Location (provider location):  Off-site  Platform used for Video Visit: Jose

## 2023-10-05 DIAGNOSIS — N81.11 MIDLINE CYSTOCELE: Primary | ICD-10-CM

## 2023-10-05 DIAGNOSIS — N81.6 RECTOCELE: ICD-10-CM

## 2023-11-25 ASSESSMENT — ANXIETY QUESTIONNAIRES
8. IF YOU CHECKED OFF ANY PROBLEMS, HOW DIFFICULT HAVE THESE MADE IT FOR YOU TO DO YOUR WORK, TAKE CARE OF THINGS AT HOME, OR GET ALONG WITH OTHER PEOPLE?: NOT DIFFICULT AT ALL
IF YOU CHECKED OFF ANY PROBLEMS ON THIS QUESTIONNAIRE, HOW DIFFICULT HAVE THESE PROBLEMS MADE IT FOR YOU TO DO YOUR WORK, TAKE CARE OF THINGS AT HOME, OR GET ALONG WITH OTHER PEOPLE: NOT DIFFICULT AT ALL
5. BEING SO RESTLESS THAT IT IS HARD TO SIT STILL: NOT AT ALL
6. BECOMING EASILY ANNOYED OR IRRITABLE: NOT AT ALL
2. NOT BEING ABLE TO STOP OR CONTROL WORRYING: NOT AT ALL
GAD7 TOTAL SCORE: 1
7. FEELING AFRAID AS IF SOMETHING AWFUL MIGHT HAPPEN: SEVERAL DAYS
7. FEELING AFRAID AS IF SOMETHING AWFUL MIGHT HAPPEN: SEVERAL DAYS
3. WORRYING TOO MUCH ABOUT DIFFERENT THINGS: NOT AT ALL
1. FEELING NERVOUS, ANXIOUS, OR ON EDGE: NOT AT ALL
4. TROUBLE RELAXING: NOT AT ALL
GAD7 TOTAL SCORE: 1

## 2023-11-30 ENCOUNTER — OFFICE VISIT (OUTPATIENT)
Dept: OBGYN | Facility: CLINIC | Age: 48
End: 2023-11-30
Attending: OBSTETRICS & GYNECOLOGY
Payer: COMMERCIAL

## 2023-11-30 VITALS
HEART RATE: 74 BPM | HEIGHT: 66 IN | BODY MASS INDEX: 25.07 KG/M2 | WEIGHT: 156 LBS | SYSTOLIC BLOOD PRESSURE: 118 MMHG | DIASTOLIC BLOOD PRESSURE: 74 MMHG

## 2023-11-30 DIAGNOSIS — R19.5 CHANGE IN STOOL CALIBER: ICD-10-CM

## 2023-11-30 DIAGNOSIS — N39.3 STRESS INCONTINENCE DUE TO PELVIC ORGAN PROLAPSE: ICD-10-CM

## 2023-11-30 DIAGNOSIS — N81.11 CYSTOCELE, MIDLINE: Primary | ICD-10-CM

## 2023-11-30 DIAGNOSIS — N81.6 RECTOCELE: ICD-10-CM

## 2023-11-30 PROCEDURE — 99213 OFFICE O/P EST LOW 20 MIN: CPT | Performed by: OBSTETRICS & GYNECOLOGY

## 2023-11-30 PROCEDURE — 99204 OFFICE O/P NEW MOD 45 MIN: CPT | Performed by: OBSTETRICS & GYNECOLOGY

## 2023-11-30 RX ORDER — ACETAMINOPHEN 325 MG/1
975 TABLET ORAL ONCE
Status: CANCELLED | OUTPATIENT
Start: 2023-11-30 | End: 2023-11-30

## 2023-11-30 NOTE — PROGRESS NOTES
"Progress Note    SUBJECTIVE:  Bibiana Lira is an 48 year old, , who is here for hysterectomy consultation.     Concerns today include:  Bibiana presents for a hysterectomy consultation. She has been experiencing pelvic organ prolapse for several months now, and reports leaking of urine during laughing, coughing, sneezing, etc. She also reports decreased caliber of stool recently and increased loose stools. She is going through menopause and her last period was 2023. She reports hot flashes.    Menstrual History:      2019    11:15 AM 10/3/2020    10:40 AM 2023    12:40 PM   Menstrual History   LAST MENSTRUAL PERIOD 2019       Last    Lab Results   Component Value Date    PAP NIL 12/10/2020     History of abnormal Pap smear: YES - SAM 2/3 on biopsy - PAP/HPV co-testing at 12, 24 months.  If two negative results repeat co-testing in 3 years, if negative then routine screening.  LEEP in     Last   Lab Results   Component Value Date    HPV16 Negative 2023    HPV16 Negative 12/10/2020     Last   Lab Results   Component Value Date    HPV18 Negative 2023    HPV18 Negative 12/10/2020     Last   Lab Results   Component Value Date    HRHPV Negative 2023    HRHPV Positive 12/10/2020       Mammogram current: yes  Last Mammogram:       Last Colonoscopy:  No results found for this or any previous visit.  Patient states she had a endoscopy and colonoscopy about 5 years ago.  Colonoscopy was normal.    HISTORY:    Allergies   Allergen Reactions    Food Diarrhea and GI Disturbance    Gluten Meal GI Disturbance     \"flu like symptoms\"    Lactase-Lactobacillus Fatigue and GI Disturbance    [place] Oil Headache     migraine    Sulfa Antibiotics      Verified in [employer]: Y, Severity in [employer]: S  Other reaction(s): Erythema    Tilactase GI Disturbance     AND fatigue    Sulfadiazine Rash     Immunization History   Administered Date(s) Administered    " COVID-19 Monovalent 18+ (Moderna) 2021, 2021    DTAP (<7y) 2011    HIB (PRP-T) 2011    Hepatitis B, Adult 02/10/2000, 2000, 2000    Influenza (IIV3) PF 10/24/2013    Influenza Vaccine >6 months,quad, PF 11/10/2014, 10/16/2015, 2016, 2018    Mantoux Tuberculin Skin Test 2013    Pneumo Conj 13-V (2010&after) 2011    TDAP Vaccine (Boostrix) 2012, 2020       OB History    Para Term  AB Living   4 2 2 0 2 2   SAB IAB Ectopic Multiple Live Births   0 0 0 0 2     Past Medical History:   Diagnosis Date    SAM II (cervical intraepithelial neoplasia II)     LEEP completed    Irritable bowel syndrome without diarrhea     avoids caramel color; gluten free    Personal history of other medical treatment (CODE)     , both vaginal deliveries.  Prolapsed uterus with first delivery.    Stomach ulcer 2019     Past Surgical History:   Procedure Laterality Date    COLONOSCOPY N/A 2019    F/U  normal    ENDOSCOPY  2019    Pyloric ulcer    ESOPHAGOSCOPY, GASTROSCOPY, DUODENOSCOPY (EGD), COMBINED N/A 2019    Procedure: ESOPHAGOGASTRODUODENOSCOPY, WITH BIOPSY;  Surgeon: Oli Jackson MD;  Location: [facility] OR    EXTRACTION(S) DENTAL      [place] teeth extraction    LEEP TX, CERVICAL      OTHER SURGICAL HISTORY      10/26/2017,[id number],ANKLE SURGERY,ligamentous repair; Dr. Ren     Family History   Problem Relation Age of Onset    Diabetes Mother         Diabetes    Hypertension Mother         Hypertension    Heart Disease Mother         Heart Disease,pacemaker    Other - See Comments Mother         Stoke x 2    Hyperlipidemia Mother         Hyperlipidemia    Cerebrovascular Disease Mother     Diabetes Type 2  Mother     Other - See Comments Father         Actinic keratoses    Hypertension Father         Hypertension    Hyperlipidemia Father         Hyperlipidemia    Diabetes Type 2  Father         Diabetes type II     "Family History Negative Brother         Good Health    Other - See Comments Sister         Gluten intolerance     Social History     Socioeconomic History    Marital status: Single     Spouse name: None    Number of children: None    Years of education: None    Highest education level: None   Tobacco Use    Smoking status: Never     Passive exposure: Never    Smokeless tobacco: Never   Vaping Use    Vaping Use: Never used   Substance and Sexual Activity    Alcohol use: Yes     Comment:  rare    Drug use: No    Sexual activity: Not Currently     Partners: Male     Comment: Birth control method: would like to become sexually active soon with boyfriend   Social History Narrative     2010, final in 2011.  Two children.    Living with her parents in [place].    Employed with [employer].    Starting LPN training fall 2013, completed.    In relationship - possibly thinking about marriage in 2016.       ROS    EXAM:  Blood pressure 118/74, pulse 74, height 1.676 m (5' 6\"), weight 70.8 kg (156 lb), not currently breastfeeding. Body mass index is 25.18 kg/m .  General - pleasant female in no acute distress.  Skin - no suspicious lesions or rashes  Abdomen - soft, nontender, nondistended, no masses or organomegaly noted.  Musculoskeletal - no gross deformities.  Neurological - normal strength, sensation, and mental status.    Pelvic Exam:  EG/BUS: Normal genital architecture without lesions, erythema or abnormal secretions Bartholin's, Urethra, Blue Bell's normal   Urethral meatus: normal    Urethra: no masses, tenderness, or scarring    Bladder: no masses or tenderness    Vagina: moist, pink, rugae with creamy, white, and odorless  secretions. Grade 3 cystocele and grade 2 rectocele  Cervix: No gross lesions, appears shortened c/w prior LEEP procedure  Uterus: small, smooth, firm, mobile w/o pain, descensus to mid vagina  Adnexa: Within normal limits and No masses, nodularity, tenderness  Rectum:anus normal "       ASSESSMENT:  Encounter Diagnoses   Name Primary?    Cystocele, midline Yes    Rectocele     Stress incontinence due to pelvic organ prolapse     Change in stool caliber         PLAN:   -Pre-op TVUS ordered for pre-op eval  No orders of the defined types were placed in this encounter.  Given change in stool recommend colonoscopy prior to surgery, referral placed.    Discussed surgery and risks/benefits and alternatives.  Reviewed recovery and lifting/pelvic restrictions. Order for surgery placed, plan total hysterectomy given history of SAM 2    Additional teaching done at this visit regarding .    This patient was seen and discussed with Dr. Raman.  Sujatha Morales, MS3  [facility]    I, Maria Raman, was present with the medical student who participated in the service and in the documentation of the note. I have verified the history and personally performed the physical exam and medical decision making. I agree with the assessment and plan of care as documented in the note.     Maria Raman MD    Answers submitted by the patient for this visit:  ELEAZAR-7 (Submitted on 11/25/2023)  ELEAZAR 7 TOTAL SCORE: 1

## 2023-11-30 NOTE — PATIENT INSTRUCTIONS
Thank you for trusting us with your care!     If you need to contact us for questions about:  Symptoms, Scheduling & Medical Questions; Non-urgent (2-3 day response) Jamal message, Urgent (needing response today) [phone] (if after 3:30pm next day response)   Prescriptions: Please call your Pharmacy   Billing: [place] [phone] or [facility]:[phone]

## 2023-11-30 NOTE — LETTER
2023       RE: Bibiana Lira  [address] [address] [address]     Dear Colleague,    Thank you for referring your patient, Bibiana Lira, to the [facility] [place] at [facility]. Please see a copy of my visit note below.    Chief Complaint   Patient presents with    Establish Care     Hysterectomy consult    Herminia Moran LPN   Answers submitted by the patient for this visit:  ELEAZAR-7 (Submitted on 2023)  ELEAZAR 7 TOTAL SCORE: 1      Progress Note    SUBJECTIVE:  Bibiana Lira is an 48 year old, , who is here for hysterectomy consultation.     Concerns today include:  Bibiana presents for a hysterectomy consultation. She has been experiencing pelvic organ prolapse for several months now, and reports leaking of urine during laughing, coughing, sneezing, etc. She also reports decreased caliber of stool recently and increased loose stools. She is going through menopause and her last period was 2023. She reports hot flashes.    Menstrual History:      2019    11:15 AM 10/3/2020    10:40 AM 2023    12:40 PM   Menstrual History   LAST MENSTRUAL PERIOD 2019       Last    Lab Results   Component Value Date    PAP NIL 12/10/2020     History of abnormal Pap smear: YES - SAM 2/3 on biopsy - PAP/HPV co-testing at 12, 24 months.  If two negative results repeat co-testing in 3 years, if negative then routine screening.  LEEP in 2016    Last   Lab Results   Component Value Date    HPV16 Negative 2023    HPV16 Negative 12/10/2020     Last   Lab Results   Component Value Date    HPV18 Negative 2023    HPV18 Negative 12/10/2020     Last   Lab Results   Component Value Date    HRHPV Negative 2023    HRHPV Positive 12/10/2020       Mammogram current: yes  Last Mammogram:       Last Colonoscopy:  No results found for this or any previous visit.  Patient  "states she had a endoscopy and colonoscopy about 5 years ago.  Colonoscopy was normal.    HISTORY:    Allergies   Allergen Reactions    Food Diarrhea and GI Disturbance    Gluten Meal GI Disturbance     \"flu like symptoms\"    Lactase-Lactobacillus Fatigue and GI Disturbance    [place] Oil Headache     migraine    Sulfa Antibiotics      Verified in [employer]: Y, Severity in [employer]: S  Other reaction(s): Erythema    Tilactase GI Disturbance     AND fatigue    Sulfadiazine Rash     Immunization History   Administered Date(s) Administered    COVID-19 Monovalent 18+ (Moderna) 2021, 2021    DTAP (<7y) 2011    HIB (PRP-T) 2011    Hepatitis B, Adult 02/10/2000, 2000, 2000    Influenza (IIV3) PF 10/24/2013    Influenza Vaccine >6 months,quad, PF 11/10/2014, 10/16/2015, 2016, 2018    Mantoux Tuberculin Skin Test 2013    Pneumo Conj 13-V (2010&after) 2011    TDAP Vaccine (Boostrix) 2012, 2020       OB History    Para Term  AB Living   4 2 2 0 2 2   SAB IAB Ectopic Multiple Live Births   0 0 0 0 2     Past Medical History:   Diagnosis Date    SAM II (cervical intraepithelial neoplasia II)     LEEP completed    Irritable bowel syndrome without diarrhea     avoids caramel color; gluten free    Personal history of other medical treatment (CODE)     , both vaginal deliveries.  Prolapsed uterus with first delivery.    Stomach ulcer 2019     Past Surgical History:   Procedure Laterality Date    COLONOSCOPY N/A 2019    F/U  normal    ENDOSCOPY  2019    Pyloric ulcer    ESOPHAGOSCOPY, GASTROSCOPY, DUODENOSCOPY (EGD), COMBINED N/A 2019    Procedure: ESOPHAGOGASTRODUODENOSCOPY, WITH BIOPSY;  Surgeon: Oli Jackson MD;  Location: [facility] OR    EXTRACTION(S) DENTAL      [place] teeth extraction    LEEP TX, CERVICAL  2016    OTHER SURGICAL HISTORY      10/26/2017,[id number],ANKLE SURGERY,ligamentous repair; Dr. Ren     Family " "History   Problem Relation Age of Onset    Diabetes Mother         Diabetes    Hypertension Mother         Hypertension    Heart Disease Mother         Heart Disease,pacemaker    Other - See Comments Mother         Stoke x 2    Hyperlipidemia Mother         Hyperlipidemia    Cerebrovascular Disease Mother     Diabetes Type 2  Mother     Other - See Comments Father         Actinic keratoses    Hypertension Father         Hypertension    Hyperlipidemia Father         Hyperlipidemia    Diabetes Type 2  Father         Diabetes type II    Family History Negative Brother         Good Health    Other - See Comments Sister         Gluten intolerance     Social History     Socioeconomic History    Marital status: Single     Spouse name: None    Number of children: None    Years of education: None    Highest education level: None   Tobacco Use    Smoking status: Never     Passive exposure: Never    Smokeless tobacco: Never   Vaping Use    Vaping Use: Never used   Substance and Sexual Activity    Alcohol use: Yes     Comment:  rare    Drug use: No    Sexual activity: Not Currently     Partners: Male     Comment: Birth control method: would like to become sexually active soon with boyfriend   Social History Narrative     2010, final in 2011.  Two children.    Living with her parents in [place].    Employed with [employer].    Starting LPN training fall 2013, completed.    In relationship - possibly thinking about marriage in 2016.       ROS    EXAM:  Blood pressure 118/74, pulse 74, height 1.676 m (5' 6\"), weight 70.8 kg (156 lb), not currently breastfeeding. Body mass index is 25.18 kg/m .  General - pleasant female in no acute distress.  Skin - no suspicious lesions or rashes  Abdomen - soft, nontender, nondistended, no masses or organomegaly noted.  Musculoskeletal - no gross deformities.  Neurological - normal strength, sensation, and mental status.    Pelvic Exam:  EG/BUS: Normal genital architecture without " lesions, erythema or abnormal secretions Bartholin's, Urethra, [place]'s normal   Urethral meatus: normal    Urethra: no masses, tenderness, or scarring    Bladder: no masses or tenderness    Vagina: moist, pink, rugae with creamy, white, and odorless  secretions. Grade 3 cystocele and grade 2 rectocele  Cervix: No gross lesions, appears shortened c/w prior LEEP procedure  Uterus: small, smooth, firm, mobile w/o pain, descensus to mid vagina  Adnexa: Within normal limits and No masses, nodularity, tenderness  Rectum:anus normal       ASSESSMENT:  Encounter Diagnoses   Name Primary?    Cystocele, midline Yes    Rectocele     Stress incontinence due to pelvic organ prolapse     Change in stool caliber         PLAN:   -Pre-op TVUS ordered for pre-op eval  No orders of the defined types were placed in this encounter.  Given change in stool recommend colonoscopy prior to surgery, referral placed.    Discussed surgery and risks/benefits and alternatives.  Reviewed recovery and lifting/pelvic restrictions. Order for surgery placed, plan total hysterectomy given history of SAM 2    Additional teaching done at this visit regarding .    This patient was seen and discussed with Dr. Raman.  Sujatha Morales, MS3  [facility]    I, Maria Raman, was present with the medical student who participated in the service and in the documentation of the note. I have verified the history and personally performed the physical exam and medical decision making. I agree with the assessment and plan of care as documented in the note.     Maria Raman MD    Answers submitted by the patient for this visit:  ELEAZAR-7 (Submitted on 11/25/2023)  ELEAZAR 7 TOTAL SCORE: 1

## 2023-11-30 NOTE — PROGRESS NOTES
Chief Complaint   Patient presents with    Establish Care     Hysterectomy consult    Herminia Moran LPN   Answers submitted by the patient for this visit:  ELEAZAR-7 (Submitted on 11/25/2023)  ELEAZAR 7 TOTAL SCORE: 1

## 2023-12-01 DIAGNOSIS — Z12.11 ENCOUNTER FOR SCREENING COLONOSCOPY: Primary | ICD-10-CM

## 2023-12-01 NOTE — TELEPHONE ENCOUNTER
Screening Questions for the Scheduling of Screening Colonoscopies   (If Colonoscopy is diagnostic, Provider should review the chart before scheduling.)  Are you younger than 45 or older than 80?  NO  Do you take aspirin or fish oil?  NO (if yes, tell patient to stop 1 week prior to Colonoscopy)  Do you take warfarin (Coumadin), clopidogrel (Plavix), apixaban (Eliquis), dabigatram (Pradaxa), rivaroxaban (Xarelto) or any blood thinner? NO  Do you take Ozempic? NO  Do you use oxygen or a CPAP at home?  NO  Do you have kidney disease? NO  Are you on dialysis? NO  Have you had a stroke or heart attack in the last year? NO  Have you had a stent in your heart or any blood vessel in the last year? NO  Have you had a transplant of any organ? NO  Have you had a colonoscopy or upper endoscopy (EGD) before? YES         When?  2019  Date of scheduled Colonoscopy. 01/22/2024  Provider DAO  [facility]

## 2023-12-04 RX ORDER — BISACODYL 5 MG/1
TABLET, DELAYED RELEASE ORAL
Qty: 2 TABLET | Refills: 0 | Status: ON HOLD | OUTPATIENT
Start: 2024-01-15 | End: 2024-01-22

## 2023-12-04 RX ORDER — POLYETHYLENE GLYCOL 3350, SODIUM CHLORIDE, SODIUM BICARBONATE, POTASSIUM CHLORIDE 420; 11.2; 5.72; 1.48 G/4L; G/4L; G/4L; G/4L
4000 POWDER, FOR SOLUTION ORAL ONCE
Qty: 4000 ML | Refills: 0 | Status: SHIPPED | OUTPATIENT
Start: 2024-01-15 | End: 2024-01-15

## 2023-12-05 ENCOUNTER — TELEPHONE (OUTPATIENT)
Dept: FAMILY MEDICINE | Facility: OTHER | Age: 48
End: 2023-12-05
Payer: COMMERCIAL

## 2023-12-05 NOTE — TELEPHONE ENCOUNTER
Patient called and would like a referral for an MRI of her shoulder and a hearing evaluation. Is this something that can be done during her pre op that will be in January? Thanks!  Cherie Bonilla on 12/5/2023 at 8:56 AM

## 2023-12-05 NOTE — TELEPHONE ENCOUNTER
She will need a separate appointment for these. Please call and make an appointment.  Shi Dawkins LPN  12/5/2023  9:07 AM

## 2023-12-19 ENCOUNTER — PREP FOR PROCEDURE (OUTPATIENT)
Dept: UROLOGY | Facility: CLINIC | Age: 48
End: 2023-12-19
Payer: COMMERCIAL

## 2024-01-05 ENCOUNTER — HOSPITAL ENCOUNTER (OUTPATIENT)
Dept: ULTRASOUND IMAGING | Facility: OTHER | Age: 49
Discharge: HOME | End: 2024-01-05
Attending: OBSTETRICS & GYNECOLOGY | Admitting: OBSTETRICS & GYNECOLOGY
Payer: COMMERCIAL

## 2024-01-05 DIAGNOSIS — N39.3 STRESS INCONTINENCE DUE TO PELVIC ORGAN PROLAPSE: ICD-10-CM

## 2024-01-05 DIAGNOSIS — N81.6 RECTOCELE: ICD-10-CM

## 2024-01-05 DIAGNOSIS — N81.11 CYSTOCELE, MIDLINE: ICD-10-CM

## 2024-01-05 PROCEDURE — 76856 US EXAM PELVIC COMPLETE: CPT

## 2024-01-05 PROCEDURE — 76830 TRANSVAGINAL US NON-OB: CPT

## 2024-01-16 PROBLEM — R10.84 ABDOMINAL PAIN, GENERALIZED: Status: RESOLVED | Noted: 2019-12-18 | Resolved: 2024-01-16

## 2024-01-16 PROBLEM — K25.3 ACUTE PYLORIC CHANNEL ULCER: Status: RESOLVED | Noted: 2019-12-23 | Resolved: 2024-01-16

## 2024-01-16 PROBLEM — R19.4 CHANGE IN BOWEL HABITS: Status: ACTIVE | Noted: 2024-01-16

## 2024-01-20 ENCOUNTER — NURSE TRIAGE (OUTPATIENT)
Dept: NURSING | Facility: CLINIC | Age: 49
End: 2024-01-20
Payer: COMMERCIAL

## 2024-01-21 NOTE — TELEPHONE ENCOUNTER
Patient has a colonoscopy on 7 am on Monday.  She cannot find the instructions. No instructions in her file.    She was given Doculax 5mg, 2 tab and Nulytely powder in a jug.    Care Advise given. Patient able to repeat instructions.      Evin Millard RN, BSN  Triage Nurse Advisor          Reason for Disposition   Bowel prep for colonoscopy, questions about    Protocols used: Colonoscopy Symptoms and Wbncedvag-P-DO

## 2024-01-22 ENCOUNTER — ANESTHESIA EVENT (OUTPATIENT)
Dept: SURGERY | Facility: OTHER | Age: 49
End: 2024-01-22
Payer: COMMERCIAL

## 2024-01-22 ENCOUNTER — ANESTHESIA (OUTPATIENT)
Dept: SURGERY | Facility: OTHER | Age: 49
End: 2024-01-22
Payer: COMMERCIAL

## 2024-01-22 ENCOUNTER — HOSPITAL ENCOUNTER (OUTPATIENT)
Facility: OTHER | Age: 49
Discharge: HOME | End: 2024-01-22
Attending: SURGERY | Admitting: SURGERY
Payer: COMMERCIAL

## 2024-01-22 VITALS
TEMPERATURE: 97.4 F | OXYGEN SATURATION: 99 % | HEART RATE: 71 BPM | HEIGHT: 66 IN | SYSTOLIC BLOOD PRESSURE: 117 MMHG | BODY MASS INDEX: 25.07 KG/M2 | WEIGHT: 156 LBS | RESPIRATION RATE: 18 BRPM | DIASTOLIC BLOOD PRESSURE: 86 MMHG

## 2024-01-22 PROCEDURE — 999N000010 HC STATISTIC ANES STAT CODE-CRNA PER MINUTE: Performed by: SURGERY

## 2024-01-22 PROCEDURE — 45380 COLONOSCOPY AND BIOPSY: CPT | Performed by: SURGERY

## 2024-01-22 PROCEDURE — 45378 DIAGNOSTIC COLONOSCOPY: CPT | Performed by: SURGERY

## 2024-01-22 PROCEDURE — 250N000011 HC RX IP 250 OP 636: Performed by: NURSE ANESTHETIST, CERTIFIED REGISTERED

## 2024-01-22 PROCEDURE — 258N000003 HC RX IP 258 OP 636: Performed by: SURGERY

## 2024-01-22 PROCEDURE — 45380 COLONOSCOPY AND BIOPSY: CPT | Performed by: NURSE ANESTHETIST, CERTIFIED REGISTERED

## 2024-01-22 PROCEDURE — 88305 TISSUE EXAM BY PATHOLOGIST: CPT

## 2024-01-22 PROCEDURE — 250N000009 HC RX 250: Performed by: NURSE ANESTHETIST, CERTIFIED REGISTERED

## 2024-01-22 RX ORDER — LIDOCAINE HYDROCHLORIDE 20 MG/ML
INJECTION, SOLUTION INFILTRATION; PERINEURAL PRN
Status: DISCONTINUED | OUTPATIENT
Start: 2024-01-22 | End: 2024-01-22

## 2024-01-22 RX ORDER — PROPOFOL 10 MG/ML
INJECTION, EMULSION INTRAVENOUS PRN
Status: DISCONTINUED | OUTPATIENT
Start: 2024-01-22 | End: 2024-01-22

## 2024-01-22 RX ORDER — NALOXONE HYDROCHLORIDE 0.4 MG/ML
0.2 INJECTION, SOLUTION INTRAMUSCULAR; INTRAVENOUS; SUBCUTANEOUS
Status: DISCONTINUED | OUTPATIENT
Start: 2024-01-22 | End: 2024-01-22 | Stop reason: HOSPADM

## 2024-01-22 RX ORDER — SODIUM CHLORIDE, SODIUM LACTATE, POTASSIUM CHLORIDE, CALCIUM CHLORIDE 600; 310; 30; 20 MG/100ML; MG/100ML; MG/100ML; MG/100ML
INJECTION, SOLUTION INTRAVENOUS CONTINUOUS
Status: DISCONTINUED | OUTPATIENT
Start: 2024-01-22 | End: 2024-01-22 | Stop reason: HOSPADM

## 2024-01-22 RX ORDER — PROPOFOL 10 MG/ML
INJECTION, EMULSION INTRAVENOUS CONTINUOUS PRN
Status: DISCONTINUED | OUTPATIENT
Start: 2024-01-22 | End: 2024-01-22

## 2024-01-22 RX ORDER — ONDANSETRON 2 MG/ML
4 INJECTION INTRAMUSCULAR; INTRAVENOUS
Status: DISCONTINUED | OUTPATIENT
Start: 2024-01-22 | End: 2024-01-22 | Stop reason: HOSPADM

## 2024-01-22 RX ORDER — FLUMAZENIL 0.1 MG/ML
0.2 INJECTION, SOLUTION INTRAVENOUS
Status: DISCONTINUED | OUTPATIENT
Start: 2024-01-22 | End: 2024-01-22 | Stop reason: HOSPADM

## 2024-01-22 RX ORDER — NALOXONE HYDROCHLORIDE 0.4 MG/ML
0.4 INJECTION, SOLUTION INTRAMUSCULAR; INTRAVENOUS; SUBCUTANEOUS
Status: DISCONTINUED | OUTPATIENT
Start: 2024-01-22 | End: 2024-01-22 | Stop reason: HOSPADM

## 2024-01-22 RX ORDER — LIDOCAINE 40 MG/G
CREAM TOPICAL
Status: DISCONTINUED | OUTPATIENT
Start: 2024-01-22 | End: 2024-01-22 | Stop reason: HOSPADM

## 2024-01-22 RX ADMIN — PROPOFOL 150 MCG/KG/MIN: 10 INJECTION, EMULSION INTRAVENOUS at 08:25

## 2024-01-22 RX ADMIN — LIDOCAINE HYDROCHLORIDE 40 MG: 20 INJECTION, SOLUTION INFILTRATION; PERINEURAL at 08:25

## 2024-01-22 RX ADMIN — SODIUM CHLORIDE, SODIUM LACTATE, POTASSIUM CHLORIDE, AND CALCIUM CHLORIDE: 600; 310; 30; 20 INJECTION, SOLUTION INTRAVENOUS at 07:36

## 2024-01-22 RX ADMIN — PROPOFOL 100 MG: 10 INJECTION, EMULSION INTRAVENOUS at 08:25

## 2024-01-22 ASSESSMENT — ACTIVITIES OF DAILY LIVING (ADL): ADLS_ACUITY_SCORE: 20

## 2024-01-22 NOTE — OP NOTE
PROCEDURE NOTE    DATE OF SERVICE: 1/22/2024    SURGEON: Oli Jackson MD    PRE-OP DIAGNOSIS:    Family history of colon cancer  Change in bowel habits        POST-OP DIAGNOSIS:  Same  Normal Exam  Rectocele    PROCEDURE:   Colonoscopy with random biopsies      ANESTHESIA:  MELA Lindsey CRNA    INDICATION FOR THE PROCEDURE: The patient is a 48 year old female with change in bowel habits . The patient has no other complaints  . After explaining the risks to include bleeding, perforation, potential inability toreach the cecum, the patient wished to proceed.    PROCEDURE:After adequate sedation, the patient was in the left lateral decubitus position.  Rectal exam was performed.  There was normal tone and no palpable masses and rectocele present.  The colonoscope was introduced into the rectum and advanced to the cecum with Mild difficulty.  The patient's prep was excellent.  The terminal cecum was reached.  The TI, cecum, ascending, transverse, descending and sigmoid colon was unremarkable . Random biopsies taken from TI, right, left and rectum  The scope was retroflexed in the rectum.  The rectum was unremarkable  .  The scope was straightened and removed.  The patient tolerated the procedure well.     ESTIMATED BLOOD LOSS: none    COMPLICATIONS:  None    TISSUE REMOVED:  Yes    RECOMMEND:      Follow-up pending pathology      Oli Jackson MD FACS

## 2024-01-22 NOTE — ANESTHESIA PREPROCEDURE EVALUATION
"Anesthesia Pre-Procedure Evaluation    Patient: Bibiana Lira   MRN: [id number] : 1975        Procedure : Procedure(s):  Colonoscopy          Past Medical History:   Diagnosis Date     SAM II (cervical intraepithelial neoplasia II) 2016    LEEP completed     Irritable bowel syndrome without diarrhea     avoids caramel color; gluten free     Personal history of other medical treatment (CODE)     , both vaginal deliveries.  Prolapsed uterus with first delivery.     Stomach ulcer 2019      Past Surgical History:   Procedure Laterality Date     COLONOSCOPY N/A 2019    F/U  normal     ENDOSCOPY  2019    Pyloric ulcer     ESOPHAGOSCOPY, GASTROSCOPY, DUODENOSCOPY (EGD), COMBINED N/A 2019    Procedure: ESOPHAGOGASTRODUODENOSCOPY, WITH BIOPSY;  Surgeon: Oli Jackson MD;  Location: [facility] OR     EXTRACTION(S) DENTAL      [place] teeth extraction     LEEP TX, CERVICAL  2016     OTHER SURGICAL HISTORY      10/26/2017,[id number],ANKLE SURGERY,ligamentous repair; Dr. Ren      Allergies   Allergen Reactions     Food Diarrhea and GI Disturbance     Gluten Meal GI Disturbance     \"flu like symptoms\"     Lactase-Lactobacillus Fatigue and GI Disturbance     [place] Oil Headache     migraine     Sulfa Antibiotics      Verified in Meditech: Y, Severity in Med[employer]ch: S  Other reaction(s): Erythema     Tilactase GI Disturbance     AND fatigue      Social History     Tobacco Use     Smoking status: Never     Passive exposure: Never     Smokeless tobacco: Never   Substance Use Topics     Alcohol use: Yes     Comment:  rare      Wt Readings from Last 1 Encounters:   24 70.8 kg (156 lb)        Anesthesia Evaluation   Pt has had prior anesthetic.         ROS/MED HX  ENT/Pulmonary:       Neurologic:     (+)      migraines,                          Cardiovascular:       METS/Exercise Tolerance: >4 METS    Hematologic:       Musculoskeletal:       GI/Hepatic:       Renal/Genitourinary:       Endo:     " "  Psychiatric/Substance Use:       Infectious Disease:       Malignancy:       Other:          Physical Exam    Airway  airway exam normal      Mallampati: II   TM distance: > 3 FB   Neck ROM: full   Mouth opening: > 3 cm    Respiratory Devices and Support         Dental       (+) Minor Abnormalities - some fillings, tiny chips      Cardiovascular   cardiovascular exam normal       Rhythm and rate: regular and normal     Pulmonary   pulmonary exam normal        breath sounds clear to auscultation       OUTSIDE LABS:  CBC:   Lab Results   Component Value Date    WBC 6.0 06/16/2023    WBC 6.3 12/10/2020    HGB 13.3 06/16/2023    HGB 12.9 12/10/2020    HCT 39.1 06/16/2023    HCT 38.5 12/10/2020     06/16/2023     12/10/2020     BMP:   Lab Results   Component Value Date     06/16/2023     12/10/2020    POTASSIUM 4.2 06/16/2023    POTASSIUM 3.6 12/10/2020    CHLORIDE 105 06/16/2023    CHLORIDE 104 12/10/2020    CO2 27 06/16/2023    CO2 25 12/10/2020    BUN 11.9 06/16/2023    BUN 9 12/10/2020    CR 0.61 06/16/2023    CR 0.67 12/10/2020     (H) 06/16/2023     12/10/2020     COAGS: No results found for: \"PTT\", \"INR\", \"FIBR\"  POC:   Lab Results   Component Value Date    HCG Negative 12/18/2019     HEPATIC:   Lab Results   Component Value Date    ALBUMIN 4.3 12/10/2020    PROTTOTAL 7.4 12/10/2020    ALT 16 12/10/2020    AST 18 12/10/2020    ALKPHOS 69 12/10/2020    BILITOTAL 0.5 12/10/2020     OTHER:   Lab Results   Component Value Date    A1C 5.3 06/16/2023    ARVIND 9.4 06/16/2023    LIPASE 18 12/16/2019    AMYLASE 20 (L) 12/16/2019    T4 0.61 04/09/2018       Anesthesia Plan    ASA Status:  1    NPO Status:  NPO Appropriate    Anesthesia Type: MAC.     - Reason for MAC: straight local not clinically adequate   Induction: Intravenous.   Maintenance: Balanced.        Consents    Anesthesia Plan(s) and associated risks, benefits, and realistic alternatives discussed. Questions answered and " "patient/representative(s) expressed understanding.     - Discussed: Risks, Benefits and Alternatives for BOTH SEDATION and the PROCEDURE were discussed     - Discussed with:  Patient      - Extended Intubation/Ventilatory Support Discussed: No.      - Patient is DNR/DNI Status: No     Use of blood products discussed: No .     Postoperative Care            Comments:    Other Comments: Risks, benefits and alternatives discussed and would like to proceed. General anesthesia ok if indicated.              DEONTE Martinez CRNA    I have reviewed the pertinent notes and labs in the chart from the past 30 days and (re)examined the patient.  Any updates or changes from those notes are reflected in this note.              # Overweight: Estimated body mass index is 25.18 kg/m  as calculated from the following:    Height as of this encounter: 1.676 m (5' 6\").    Weight as of this encounter: 70.8 kg (156 lb).      "

## 2024-01-22 NOTE — DISCHARGE INSTRUCTIONS
[facility]  Adult Discharge Orders & Instructions    ________________________________________________________________          For 12 hours after surgery  Get plenty of rest.  A responsible adult must stay with you for at least 12 hours after you leave the hospital.   You may feel lightheaded.  IF so, sit for a few minutes before standing.  Have someone help you get up.   You may have a slight fever. Call the doctor if your fever is over 101 F (38.3 C) (taken under the tongue) or lasts longer than 24 hours.  You may have a dry mouth, a sore throat, muscle aches or trouble sleeping.  These should go away after 24 hours.  Do not make important or legal decisions.  6.   Do not drive or use heavy equipment.  If you have weakness or tingling, don't drive or use heavy equipment until this feeling goes away.    To contact a doctor, call   [phone]_______________________

## 2024-01-22 NOTE — OR NURSING
Bibiana has been discharged to home at 0945 via ambulatory accompanied by sister, Anita.    Written discharge instructions were provided to patient and sister.  Prescriptions were none.      Patient and adult caring for them verbalize understanding of discharge instructions including no driving until tomorrow and no longer taking narcotic pain medications - no operating mechanical equipment and no making any important decisions.They understand reason for discharge, and necessary follow-up appointments.

## 2024-01-22 NOTE — H&P
"History and Physical    CHIEF COMPLAINT / REASON FOR PROCEDURE:  change in bowel habits    PERTINENT HISTORY   Patient is a 48 year old female who presents today for colonoscopy for change in bowel habits.   Last colonoscopy . Has FH colon cancer. Having hysterectomy for cystocele/rectocele. May have had some loose stools and change in caliber.      Past Medical History:   Diagnosis Date    SAM II (cervical intraepithelial neoplasia II)     LEEP completed    Irritable bowel syndrome without diarrhea     avoids caramel color; gluten free    Personal history of other medical treatment (CODE)     , both vaginal deliveries.  Prolapsed uterus with first delivery.    Stomach ulcer 2019     Past Surgical History:   Procedure Laterality Date    COLONOSCOPY N/A 2019    F/U  normal    ENDOSCOPY  2019    Pyloric ulcer    ESOPHAGOSCOPY, GASTROSCOPY, DUODENOSCOPY (EGD), COMBINED N/A 2019    Procedure: ESOPHAGOGASTRODUODENOSCOPY, WITH BIOPSY;  Surgeon: Oli Jackson MD;  Location: [facility] OR    EXTRACTION(S) DENTAL      [place] teeth extraction    LEEP TX, CERVICAL  2016    OTHER SURGICAL HISTORY      10/26/2017,[id number],ANKLE SURGERY,ligamentous repair; Dr. Ren       Bleeding tendencies:  No    ALLERGIES/SENSITIVITIES:   Allergies   Allergen Reactions    Food Diarrhea and GI Disturbance    Gluten Meal GI Disturbance     \"flu like symptoms\"    Lactase-Lactobacillus Fatigue and GI Disturbance    [place] Oil Headache     migraine    Sulfa Antibiotics      Verified in [employer]: Y, Severity in Meditech: S  Other reaction(s): Erythema    Tilactase GI Disturbance     AND fatigue        CURRENT MEDICATIONS:    Prior to Admission medications    Medication Sig Start Date End Date Taking? Authorizing Provider   bisacodyl (DULCOLAX) 5 MG EC tablet Take as directed by colonoscopy prep instructions 1/15/24  Yes Oli Jackson MD       Physical Exam:  /59   Pulse 72   Temp 98.3  F (36.8  C) (Tympanic) " "  Resp 20   Ht 1.676 m (5' 6\")   Wt 70.8 kg (156 lb)   SpO2 96%   BMI 25.18 kg/m    EXAM:  Chest/Respiratory Exam: Normal - Clear to auscultation without rales, rhonchi, or wheezing.  Cardiovascular Exam: regular rate and rhythm        PLAN: COLONOSCOPY .  Patient understands risks of bleeding, perforation, potential inability to reach cecum, aspiration and wishes to proceed.      "

## 2024-01-22 NOTE — ANESTHESIA POSTPROCEDURE EVALUATION
Patient: Bibiana Lira    Procedure: Procedure(s):  Colonoscopy WITH BIOPSIES       Anesthesia Type:  MAC    Note:  Disposition: Outpatient   Postop Pain Control: Uneventful            Sign Out: Well controlled pain   PONV: No   Neuro/Psych: Uneventful            Sign Out: Acceptable/Baseline neuro status   Airway/Respiratory: Uneventful            Sign Out: Acceptable/Baseline resp. status   CV/Hemodynamics: Uneventful            Sign Out: Acceptable CV status; No obvious hypovolemia; No obvious fluid overload   Other NRE: NONE   DID A NON-ROUTINE EVENT OCCUR? No           Last vitals:  Vitals Value Taken Time   /59 01/22/24 0915   Temp 97.2  F (36.2  C) 01/22/24 0915   Pulse 69 01/22/24 0915   Resp 18 01/22/24 0915   SpO2 100 % 01/22/24 0915   Vitals shown include unfiled device data.    Electronically Signed By: DEONTE GARCIA CRNA  January 22, 2024  9:28 AM

## 2024-01-22 NOTE — ANESTHESIA CARE TRANSFER NOTE
Patient: Bibiana Lira    Procedure: Procedure(s):  Colonoscopy WITH BIOPSIES       Diagnosis: Change in bowel habits [R19.4]  Diagnosis Additional Information: No value filed.    Anesthesia Type:   MAC     Note:    Oropharynx: oropharynx clear of all foreign objects  Level of Consciousness: awake  Oxygen Supplementation: room air    Independent Airway: airway patency satisfactory and stable  Dentition: dentition unchanged  Vital Signs Stable: post-procedure vital signs reviewed and stable    Patient transferred to: Phase II    Handoff Report: Identifed the Patient, Identified the Reponsible Provider, Reviewed the pertinent medical history, Discussed the surgical course, Reviewed Intra-OP anesthesia mangement and issues during anesthesia, Set expectations for post-procedure period and Allowed opportunity for questions and acknowledgement of understanding      Vitals:  Vitals Value Taken Time   BP     Temp     Pulse     Resp     SpO2         Electronically Signed By: DEONTE GARCIA CRNA  January 22, 2024  8:53 AM

## 2024-01-24 PROBLEM — R19.4 CHANGE IN BOWEL HABITS: Status: RESOLVED | Noted: 2024-01-16 | Resolved: 2024-01-24

## 2024-01-24 LAB
PATH REPORT.COMMENTS IMP SPEC: NORMAL
PATH REPORT.FINAL DX SPEC: NORMAL
PATH REPORT.RELEVANT HX SPEC: NORMAL
PHOTO IMAGE: NORMAL

## 2024-01-26 ENCOUNTER — HOSPITAL ENCOUNTER (OUTPATIENT)
Dept: MRI IMAGING | Facility: OTHER | Age: 49
Discharge: HOME | End: 2024-01-26
Attending: CHIROPRACTOR
Payer: COMMERCIAL

## 2024-01-26 ENCOUNTER — HOSPITAL ENCOUNTER (OUTPATIENT)
Dept: GENERAL RADIOLOGY | Facility: OTHER | Age: 49
Discharge: HOME | End: 2024-01-26
Attending: CHIROPRACTOR
Payer: COMMERCIAL

## 2024-01-26 DIAGNOSIS — M25.619 LIMITED RANGE OF MOTION OF SHOULDER: ICD-10-CM

## 2024-01-26 DIAGNOSIS — M25.511 RIGHT ANTERIOR SHOULDER PAIN: ICD-10-CM

## 2024-01-26 DIAGNOSIS — S46.911A STRAIN OF RIGHT SHOULDER: ICD-10-CM

## 2024-01-26 DIAGNOSIS — S46.911A RIGHT SHOULDER STRAIN: ICD-10-CM

## 2024-01-26 PROCEDURE — 255N000002 HC RX 255 OP 636: Performed by: STUDENT IN AN ORGANIZED HEALTH CARE EDUCATION/TRAINING PROGRAM

## 2024-01-26 PROCEDURE — 73222 MRI JOINT UPR EXTREM W/DYE: CPT | Mod: RT

## 2024-01-26 PROCEDURE — 77002 NEEDLE LOCALIZATION BY XRAY: CPT

## 2024-01-26 PROCEDURE — 250N000009 HC RX 250: Performed by: STUDENT IN AN ORGANIZED HEALTH CARE EDUCATION/TRAINING PROGRAM

## 2024-01-26 PROCEDURE — 23350 INJECTION FOR SHOULDER X-RAY: CPT

## 2024-01-26 PROCEDURE — A9575 INJ GADOTERATE MEGLUMI 0.1ML: HCPCS | Mod: JW | Performed by: STUDENT IN AN ORGANIZED HEALTH CARE EDUCATION/TRAINING PROGRAM

## 2024-01-26 RX ORDER — LIDOCAINE HYDROCHLORIDE 10 MG/ML
20 INJECTION, SOLUTION INFILTRATION; PERINEURAL ONCE
Status: COMPLETED | OUTPATIENT
Start: 2024-01-26 | End: 2024-01-26

## 2024-01-26 RX ORDER — GADOTERATE MEGLUMINE 376.9 MG/ML
0.1 INJECTION INTRAVENOUS ONCE
Status: COMPLETED | OUTPATIENT
Start: 2024-01-26 | End: 2024-01-26

## 2024-01-26 RX ADMIN — IOHEXOL 5 ML: 240 INJECTION, SOLUTION INTRATHECAL; INTRAVASCULAR; INTRAVENOUS; ORAL at 12:34

## 2024-01-26 RX ADMIN — GADOTERATE MEGLUMINE 0.1 ML: 376.9 INJECTION INTRAVENOUS at 12:34

## 2024-01-26 RX ADMIN — LIDOCAINE HYDROCHLORIDE 2 ML: 10 INJECTION, SOLUTION INFILTRATION; PERINEURAL at 12:34

## 2024-02-07 ENCOUNTER — TELEPHONE (OUTPATIENT)
Dept: UROLOGY | Facility: CLINIC | Age: 49
End: 2024-02-07
Payer: COMMERCIAL

## 2024-02-07 NOTE — TELEPHONE ENCOUNTER
Spoke with patient and verified time and date of 6 week follow up appointment with  - let patient know this is a video visit not an in-person visit. Confirmed 4/15 at 10:30 am video visit with  for 6 week post op.    Bibiana Saab MA on 2/7/2024 at 2:59 PM

## 2024-02-07 NOTE — TELEPHONE ENCOUNTER
Patient is scheduled for a surgical procedure with Dr. Kennedy      Spoke with: patient via phone      Date of Surgery/Procedure: Tuesday March 05, 2024    [facility]      Informed patient they will need an adult : Yes     Pre-op: Yes      H&P: Patient is scheduled 2/9/24     Additional imaging/appointments:     Post-op: Needs to be scheduled 2 weeks     Post-op: Monday April 15, 2024     Additional comments: Patient previous surgery date was in April, she left voice message wanting to coordinate her follow ups on the same day as Dr. Raman's to avoid the drive.      Surgery packet: Previously given during appointment with Dr. Raman, can send a new one to [facility]     Patient is aware that surgery time is tentative to change and to expect a call 3-1 business days from Pre Admission Nursing for instructions and arrival time

## 2024-02-09 ENCOUNTER — HOSPITAL ENCOUNTER (OUTPATIENT)
Dept: MAMMOGRAPHY | Facility: OTHER | Age: 49
Discharge: HOME | End: 2024-02-09
Attending: FAMILY MEDICINE
Payer: COMMERCIAL

## 2024-02-09 ENCOUNTER — OFFICE VISIT (OUTPATIENT)
Dept: FAMILY MEDICINE | Facility: OTHER | Age: 49
End: 2024-02-09
Attending: PHYSICIAN ASSISTANT
Payer: COMMERCIAL

## 2024-02-09 VITALS
OXYGEN SATURATION: 98 % | WEIGHT: 162 LBS | HEART RATE: 90 BPM | DIASTOLIC BLOOD PRESSURE: 60 MMHG | RESPIRATION RATE: 20 BRPM | SYSTOLIC BLOOD PRESSURE: 120 MMHG | TEMPERATURE: 97.9 F | BODY MASS INDEX: 26.03 KG/M2 | HEIGHT: 66 IN

## 2024-02-09 DIAGNOSIS — Z12.31 VISIT FOR SCREENING MAMMOGRAM: ICD-10-CM

## 2024-02-09 DIAGNOSIS — G89.29 CHRONIC RIGHT SHOULDER PAIN: ICD-10-CM

## 2024-02-09 DIAGNOSIS — Z01.818 PREOP GENERAL PHYSICAL EXAM: Primary | ICD-10-CM

## 2024-02-09 DIAGNOSIS — L71.9 ACNE ROSACEA: ICD-10-CM

## 2024-02-09 DIAGNOSIS — M25.511 CHRONIC RIGHT SHOULDER PAIN: ICD-10-CM

## 2024-02-09 DIAGNOSIS — R10.2 PELVIC PAIN IN FEMALE: ICD-10-CM

## 2024-02-09 DIAGNOSIS — H91.93 DECREASED HEARING OF BOTH EARS: ICD-10-CM

## 2024-02-09 DIAGNOSIS — N81.9 FEMALE GENITAL PROLAPSE, UNSPECIFIED TYPE: ICD-10-CM

## 2024-02-09 DIAGNOSIS — R12 HEARTBURN: ICD-10-CM

## 2024-02-09 DIAGNOSIS — N92.6 MENSTRUAL IRREGULARITY: ICD-10-CM

## 2024-02-09 LAB — HGB BLD-MCNC: 13.1 G/DL (ref 11.7–15.7)

## 2024-02-09 PROCEDURE — 85018 HEMOGLOBIN: CPT | Mod: ZL | Performed by: PHYSICIAN ASSISTANT

## 2024-02-09 PROCEDURE — 36415 COLL VENOUS BLD VENIPUNCTURE: CPT | Mod: ZL | Performed by: PHYSICIAN ASSISTANT

## 2024-02-09 PROCEDURE — 99214 OFFICE O/P EST MOD 30 MIN: CPT | Performed by: PHYSICIAN ASSISTANT

## 2024-02-09 PROCEDURE — 77063 BREAST TOMOSYNTHESIS BI: CPT

## 2024-02-09 RX ORDER — FAMOTIDINE 20 MG/1
20 TABLET, FILM COATED ORAL 2 TIMES DAILY PRN
Qty: 60 TABLET | Refills: 3 | Status: SHIPPED | OUTPATIENT
Start: 2024-02-09

## 2024-02-09 RX ORDER — POLYETHYLENE GLYCOL 3350, SODIUM CHLORIDE, SODIUM BICARBONATE, POTASSIUM CHLORIDE 420; 11.2; 5.72; 1.48 G/4L; G/4L; G/4L; G/4L
4000 POWDER, FOR SOLUTION ORAL ONCE
COMMUNITY
Start: 2024-01-18 | End: 2024-03-21

## 2024-02-09 RX ORDER — LATANOPROST 50 UG/ML
1 SOLUTION/ DROPS OPHTHALMIC DAILY
COMMUNITY
Start: 2024-01-05

## 2024-02-09 ASSESSMENT — PAIN SCALES - GENERAL: PAINLEVEL: MILD PAIN (2)

## 2024-02-09 NOTE — PATIENT INSTRUCTIONS
Patient should take the following medications the morning of surgery with a small sip of water: hold all meds.  Patient was instructed to hold the following medications the morning of surgery: hold all meds.     Patient was advised to call our office and the surgical services with any change in condition or new symptoms if they were to develop between today and their surgical date.  Especially any cardiopulmonary symptoms or symptoms concerning for an infection.     Discontinue aspirin, aleve, naproxen and ibuprofen 7 days prior to surgery to reduce bleeding risk.  It is fine to take tylenol the week before surgery.  Hold vitamins and herbal remedies for 14 days before surgery.    Use pepcid twice daily as needed for stomach irritation.      Preparing for Your Surgery  Getting started  A nurse will call you to review your health history and instructions. They will give you an arrival time based on your scheduled surgery time. Please be ready to share:  Your doctor's clinic name and phone number  Your medical, surgical, and anesthesia history  A list of allergies and sensitivities  A list of medicines, including herbal treatments and over-the-counter drugs  Whether the patient has a legal guardian (ask how to send us the papers in advance)  Please tell us if you're pregnant--or if there's any chance you might be pregnant. Some surgeries may injure a fetus (unborn baby), so they require a pregnancy test. Surgeries that are safe for a fetus don't always need a test, and you can choose whether to have one.   If you have a child who's having surgery, please ask for a copy of Preparing for Your Child's Surgery.    Preparing for surgery  Within 10 to 30 days of surgery: Have a pre-op exam (sometimes called an H&P, or History and Physical). This can be done at a clinic or pre-operative center.  If you're having a , you may not need this exam. Talk to your care team.  At your pre-op exam, talk to your care team  about all medicines you take. If you need to stop any medicines before surgery, ask when to start taking them again.  We do this for your safety. Many medicines can make you bleed too much during surgery. Some change how well surgery (anesthesia) drugs work.  Call your insurance company to let them know you're having surgery. (If you don't have insurance, call [phone].)  Call your clinic if there's any change in your health. This includes signs of a cold or flu (sore throat, runny nose, cough, rash, fever). It also includes a scrape or scratch near the surgery site.  If you have questions on the day of surgery, call your hospital or surgery center.  Eating and drinking guidelines  For your safety: Unless your surgeon tells you otherwise, follow the guidelines below.  Eat and drink as usual until 8 hours before you arrive for surgery. After that, no food or milk.  Drink clear liquids until 2 hours before you arrive. These are liquids you can see through, like water, Gatorade, and Propel Water. They also include plain black coffee and tea (no cream or milk), candy, and breath mints. You can spit out gum when you arrive.  If you drink alcohol: Stop drinking it the night before surgery.  If your care team tells you to take medicine on the morning of surgery, it's okay to take it with a sip of water.  Preventing infection  Shower or bathe the night before and morning of your surgery. Follow the instructions your clinic gave you. (If no instructions, use regular soap.)  Don't shave or clip hair near your surgery site. We'll remove the hair if needed.  Don't smoke or vape the morning of surgery. You may chew nicotine gum up to 2 hours before surgery. A nicotine patch is okay.  Note: Some surgeries require you to completely quit smoking and nicotine. Check with your surgeon.  Your care team will make every effort to keep you safe from infection. We will:  Clean our hands often with soap and water (or an alcohol-based  hand rub).  Clean the skin at your surgery site with a special soap that kills germs.  Give you a special gown to keep you warm. (Cold raises the risk of infection.)  Wear special hair covers, masks, gowns and gloves during surgery.  Give antibiotic medicine, if prescribed. Not all surgeries need antibiotics.  What to bring on the day of surgery  Photo ID and insurance card  Copy of your health care directive, if you have one  Glasses and hearing aids (bring cases)  You can't wear contacts during surgery  Inhaler and eye drops, if you use them (tell us about these when you arrive)  CPAP machine or breathing device, if you use them  A few personal items, if spending the night  If you have . . .  A pacemaker, ICD (cardiac defibrillator) or other implant: Bring the ID card.  An implanted stimulator: Bring the remote control.  A legal guardian: Bring a copy of the certified (court-stamped) guardianship papers.  Please remove any jewelry, including body piercings. Leave jewelry and other valuables at home.  If you're going home the day of surgery  You must have a responsible adult drive you home. They should stay with you overnight as well.  If you don't have someone to stay with you, and you aren't safe to go home alone, we may keep you overnight. Insurance often won't pay for this.  After surgery  If it's hard to control your pain or you need more pain medicine, please call your surgeon's office.  Questions?   If you have any questions for your care team, list them here: _________________________________________________________________________________________________________________________________________________________________________ ____________________________________ ____________________________________ ____________________________________  For informational purposes only. Not to replace the advice of your health care provider. Copyright   2003, 2019 [facility]. All rights reserved. Clinically  reviewed by Belem Iverson MD. [employer] 640080 - REV 12/22.    How to Take Your Medication Before Surgery  - Take all of your medications before surgery except as noted below  - HOLD (do not take) Aspirin for 7 days  - STOP taking all vitamins and herbal supplements 14 days before surgery.

## 2024-02-09 NOTE — PROGRESS NOTES
Preoperative Evaluation  [facility]  [address]  [place] [address]  Phone: [phone]  Fax: [phone]  Primary Provider: Margoth Ramachandran  Pre-op Performing Provider: ELIZ RITTER  Feb 9, 2024       Bibiana is a 48 year old, presenting for the following:  Pre-Op Exam (DOS 03/05/24- [facility], Dr. Raman- hysterectomy  and Urology Dr. Kennedy //)        2/9/2024    10:50 AM   Additional Questions   Roomed by Yolis RIBEIRO LPN   Accompanied by self     Surgical Information  Surgery/Procedure: HYSTERECTOMY, TOTAL, ROBOT-ASSISTED, WITH BILATERAL SALPINGECTOMY   SACROCOLPOPEXY, ROBOT-ASSISTED, LAPAROSCOPIC, WITH INSERTION OF MIDURETHRAL SLING AND CYSTOSCOPY   Surgery Location: [facility]   Surgeon: Dr. Raman hysterectomy and Dr. Kennedy urology   Surgery Date: 03/05/24  Time of Surgery: tbd  Where patient plans to recover: At home with family  Fax number for surgical facility: Note does not need to be faxed, will be available electronically in Epic.    Assessment & Plan     The proposed surgical procedure is considered INTERMEDIATE risk.    Problem List Items Addressed This Visit          Urinary    Menstrual irregularity    Relevant Orders    Hemoglobin (Completed)     Other Visit Diagnoses       Preop general physical exam    -  Primary    Relevant Orders    Hemoglobin (Completed)    Female genital prolapse, unspecified type        Pelvic pain in female        Heartburn        Relevant Medications    famotidine (PEPCID) 20 MG tablet    Decreased hearing of both ears        Relevant Orders    Adult Audiology  Referral    Chronic right shoulder pain        Relevant Orders    Orthopedic  Referral    Physical Therapy Referral    Acne rosacea        Relevant Medications    latanoprost (XALATAN) 0.005 % ophthalmic solution    metroNIDAZOLE (METROCREAM) 0.75 % external cream          Pelvic pain in female and female genital prolapse: Completed  hemoglobin for preoperative clearance.  Labs is stable.  No acute concerns at this time prior to surgery.  Patient is approved for surgery.    Chronic right shoulder pain: Referred for orthopedics for consult.  Also placed a referral for physical therapy.  Encouraged arm circles and wall climb.  Encouraged to take ibuprofen for relief up to 4 times per day.  Encouraged rest and elevation.  Encouraged to use ice or heat 15 minutes at a time several times per day to decrease pain. Return to clinic in 1-2 weeks as necessary for persistent pain. Return to clinic with any change or worsening of symptoms.     Heartburn: Patient was started on famotidine 20 mg twice daily as needed.  Encouraged bland diet.  Return for recheck if symptoms or not improving or worsening.    Decreased hearing of both ears: Referred to audiology for hearing consult.    Acne rosacea: Patient was started on metronidazole cream for treatment.  Discussed symptoms and concerns at length. Return to clinic with change/worsening of symptoms.        Risks and Recommendations  The patient has the following additional risks and recommendations for perioperative complications:   - No identified additional risk factors other than previously addressed    Antiplatelet or Anticoagulation Medication Instructions   - Patient is on no antiplatelet or anticoagulation medications.    Additional Medication Instructions  Patient Instructions   Patient should take the following medications the morning of surgery with a small sip of water: hold all meds.  Patient was instructed to hold the following medications the morning of surgery: hold all meds.     Patient was advised to call our office and the surgical services with any change in condition or new symptoms if they were to develop between today and their surgical date.  Especially any cardiopulmonary symptoms or symptoms concerning for an infection.     Discontinue aspirin, aleve, naproxen and ibuprofen 7 days  prior to surgery to reduce bleeding risk.  It is fine to take tylenol the week before surgery.  Hold vitamins and herbal remedies for 14 days before surgery.    Use pepcid twice daily as needed for stomach irritation.      Preparing for Your Surgery  Getting started  A nurse will call you to review your health history and instructions. They will give you an arrival time based on your scheduled surgery time. Please be ready to share:  Your doctor's clinic name and phone number  Your medical, surgical, and anesthesia history  A list of allergies and sensitivities  A list of medicines, including herbal treatments and over-the-counter drugs  Whether the patient has a legal guardian (ask how to send us the papers in advance)  Please tell us if you're pregnant--or if there's any chance you might be pregnant. Some surgeries may injure a fetus (unborn baby), so they require a pregnancy test. Surgeries that are safe for a fetus don't always need a test, and you can choose whether to have one.   If you have a child who's having surgery, please ask for a copy of Preparing for Your Child's Surgery.    Preparing for surgery  Within 10 to 30 days of surgery: Have a pre-op exam (sometimes called an H&P, or History and Physical). This can be done at a clinic or pre-operative center.  If you're having a , you may not need this exam. Talk to your care team.  At your pre-op exam, talk to your care team about all medicines you take. If you need to stop any medicines before surgery, ask when to start taking them again.  We do this for your safety. Many medicines can make you bleed too much during surgery. Some change how well surgery (anesthesia) drugs work.  Call your insurance company to let them know you're having surgery. (If you don't have insurance, call [phone].)  Call your clinic if there's any change in your health. This includes signs of a cold or flu (sore throat, runny nose, cough, rash, fever). It also  includes a scrape or scratch near the surgery site.  If you have questions on the day of surgery, call your hospital or surgery center.  Eating and drinking guidelines  For your safety: Unless your surgeon tells you otherwise, follow the guidelines below.  Eat and drink as usual until 8 hours before you arrive for surgery. After that, no food or milk.  Drink clear liquids until 2 hours before you arrive. These are liquids you can see through, like water, Gatorade, and Propel Water. They also include plain black coffee and tea (no cream or milk), candy, and breath mints. You can spit out gum when you arrive.  If you drink alcohol: Stop drinking it the night before surgery.  If your care team tells you to take medicine on the morning of surgery, it's okay to take it with a sip of water.  Preventing infection  Shower or bathe the night before and morning of your surgery. Follow the instructions your clinic gave you. (If no instructions, use regular soap.)  Don't shave or clip hair near your surgery site. We'll remove the hair if needed.  Don't smoke or vape the morning of surgery. You may chew nicotine gum up to 2 hours before surgery. A nicotine patch is okay.  Note: Some surgeries require you to completely quit smoking and nicotine. Check with your surgeon.  Your care team will make every effort to keep you safe from infection. We will:  Clean our hands often with soap and water (or an alcohol-based hand rub).  Clean the skin at your surgery site with a special soap that kills germs.  Give you a special gown to keep you warm. (Cold raises the risk of infection.)  Wear special hair covers, masks, gowns and gloves during surgery.  Give antibiotic medicine, if prescribed. Not all surgeries need antibiotics.  What to bring on the day of surgery  Photo ID and insurance card  Copy of your health care directive, if you have one  Glasses and hearing aids (bring cases)  You can't wear contacts during surgery  Inhaler and eye  drops, if you use them (tell us about these when you arrive)  CPAP machine or breathing device, if you use them  A few personal items, if spending the night  If you have . . .  A pacemaker, ICD (cardiac defibrillator) or other implant: Bring the ID card.  An implanted stimulator: Bring the remote control.  A legal guardian: Bring a copy of the certified (court-stamped) guardianship papers.  Please remove any jewelry, including body piercings. Leave jewelry and other valuables at home.  If you're going home the day of surgery  You must have a responsible adult drive you home. They should stay with you overnight as well.  If you don't have someone to stay with you, and you aren't safe to go home alone, we may keep you overnight. Insurance often won't pay for this.  After surgery  If it's hard to control your pain or you need more pain medicine, please call your surgeon's office.  Questions?   If you have any questions for your care team, list them here: _________________________________________________________________________________________________________________________________________________________________________ ____________________________________ ____________________________________ ____________________________________  For informational purposes only. Not to replace the advice of your health care provider. Copyright   2003, 2019 [employer] [facility]. All rights reserved. Clinically reviewed by Belem Iverson MD. [employer] [id number] - REV 12/22.    How to Take Your Medication Before Surgery  - Take all of your medications before surgery except as noted below  - HOLD (do not take) Aspirin for 7 days  - STOP taking all vitamins and herbal supplements 14 days before surgery.     Recommendation  APPROVAL GIVEN to proceed with proposed procedure, without further diagnostic evaluation.    Ordering of each unique test  Prescription drug management  30 minutes spent by me on the date of the encounter doing chart  review, history and exam, documentation and further activities per the note    Subjective       HPI related to upcoming procedure:     Patient is currently struggling with pelvic organ prolapse and significant pelvic pain for several months.  Patient has leaking of urine during laughing, coughing, and sneezing.  Decreased caliber of stool and increased loose stools.  Has pelvic discomfort that is painful.  The pelvic discomfort is worse with long car rides or standing for long period of time.  Cannot complete her normal activities such as walking and exercise as they cause discomfort.  They are going to be placing a sling to help with her urinary frequency and urinary symptoms.  Feels like her bladder is not emptying as it should.  No dysuria or hematuria.        2/9/2024    10:25 AM   Preop Questions   1. Have you ever had a heart attack or stroke? No   2. Have you ever had surgery on your heart or blood vessels, such as a stent placement, a coronary artery bypass, or surgery on an artery in your head, neck, heart, or legs? No   3. Do you have chest pain with activity? No   4. Do you have a history of  heart failure? No   5. Do you currently have a cold, bronchitis or symptoms of other infection? No   6. Do you have a cough, shortness of breath, or wheezing? No   7. Do you or anyone in your family have previous history of blood clots? YES - dad has history of a blood clot in his arm after having COVID-19   8. Do you or does anyone in your family have a serious bleeding problem such as prolonged bleeding following surgeries or cuts? No   9. Have you ever had problems with anemia or been told to take iron pills? YES - had to take iron with heavy menses in the past   10. Have you had any abnormal blood loss such as black, tarry or bloody stools, or abnormal vaginal bleeding? No   11. Have you ever had a blood transfusion? No   12. Are you willing to have a blood transfusion if it is medically needed before, during, or  after your surgery? Yes   13. Have you or any of your relatives ever had problems with anesthesia? YES - when her wisdom teeth were taken out she had a harder time waking up from surgery and had nausea after surgery. Ankle surgery since then went well   14. Do you have sleep apnea, excessive snoring or daytime drowsiness? No   15. Do you have any artifical heart valves or other implanted medical devices like a pacemaker, defibrillator, or continuous glucose monitor? No   16. Do you have artificial joints? No   17. Are you allergic to latex? No   18. Is there any chance that you may be pregnant? No       Health Care Directive  Patient does not have a Health Care Directive or Living Will: Discussed advance care planning with patient; information given to patient to review.    Preoperative Review of    reviewed - no record of controlled substances prescribed.      Status of Chronic Conditions:  Patient needs to be scheduled for repeat hearing evaluation.  Has a hard time hearing in both ears.  Currently has a hearing aid in her left ear.  Wondering if she needs to have a repeat evaluation.  May need to have a hearing aid in her right ear.  Concerned that the left ear is not hearing as well as it should.    Chiropractor recently referred her for a right shoulder MRI.  Feels like her right shoulder has been getting weaker and more painful over the last 8 to 9 months.  Having increased right anterior and superior shoulder pain.  Has a very hard time lifting her shoulder up.  Has to prop her shoulder up in the shower to be able to shave underneath her arm.  Wondering about getting an orthopedic consult.  Right shoulder MRI showed degenerative fraying involving the anterior inferior labrum and subacromial impingement with mild rotator cuff tendinosis.    Patient has been struggling with a rash on her cheeks.  Wondering if it is acne rosacea or another concern.  Does not seem to be flared with food.    Hearing eval.  Hard time hearing.  Both ears struggling.  Aid in left side now.     Patient has tolerated surgery well in the past.    Patient has no recent cough or cold symptoms.  No recent fevers, chills, sore throat, ear pain, chest pain, palpitations, problems breathing, stomachaches, nausea, vomiting, diarrhea, constipation, blood in stool or urine, dysuria, frequency, urgency.    Patient can walk up a flight of stairs without becoming short of breath or having chest pain: YES   Patient is able to tolerate greater than 4 METs of activity without any cardiopulmonary symptoms.    Patient Active Problem List    Diagnosis Date Noted    Bilateral sensorineural hearing loss 2019     Priority: Medium    Menstrual irregularity 2017     Priority: Medium     Overview:   Breakthrough spotting started Feb-2016: off and on.      Right ankle instability 2017     Priority: Medium     Overview:   S/p fracture in  and severe sprain early .      Migraine headache 2009     Priority: Medium     Overview:   Migraine Headache        Past Medical History:   Diagnosis Date    SAM II (cervical intraepithelial neoplasia II)     LEEP completed    Irritable bowel syndrome without diarrhea     avoids caramel color; gluten free    Personal history of other medical treatment (CODE)     , both vaginal deliveries.  Prolapsed uterus with first delivery.    Stomach ulcer 2019     Past Surgical History:   Procedure Laterality Date    COLONOSCOPY N/A 2019    F/U  normal    COLONOSCOPY N/A 2024    F/U  normal robert no colitis    ENDOSCOPY  2019    Pyloric ulcer    ESOPHAGOSCOPY, GASTROSCOPY, DUODENOSCOPY (EGD), COMBINED N/A 2019    Procedure: ESOPHAGOGASTRODUODENOSCOPY, WITH BIOPSY;  Surgeon: Oli Jackson MD;  Location: [facility] OR    EXTRACTION(S) DENTAL      [place] teeth extraction    LEEP TX, CERVICAL  2016    OTHER SURGICAL HISTORY      10/26/2017,INF741,ANKLE SURGERY,ligamentous  "repair; Dr. Ren     Current Outpatient Medications   Medication Sig Dispense Refill    famotidine (PEPCID) 20 MG tablet Take 1 tablet (20 mg) by mouth 2 times daily as needed (heartburn) 60 tablet 3    latanoprost (XALATAN) 0.005 % ophthalmic solution Place 1 drop into both eyes daily      metroNIDAZOLE (METROCREAM) 0.75 % external cream Apply topically 2 times daily 45 g 3    polyethylene glycol-electrolytes (NULYTELY) 420 g solution Take 4,000 mLs by mouth once         Allergies   Allergen Reactions    Food Diarrhea and GI Disturbance    Gluten Meal GI Disturbance     \"flu like symptoms\"    Lactase-Lactobacillus Fatigue and GI Disturbance    Holt Oil Headache     migraine    Sulfa Antibiotics      Verified in Meditech: Y, Severity in [employer]: S  Other reaction(s): Erythema    Tilactase GI Disturbance     AND fatigue        Social History     Tobacco Use    Smoking status: Never     Passive exposure: Never    Smokeless tobacco: Never   Substance Use Topics    Alcohol use: Yes     Comment:  rare     Family History   Problem Relation Age of Onset    Diabetes Mother         Diabetes    Hypertension Mother         Hypertension    Heart Disease Mother         Heart Disease,pacemaker    Other - See Comments Mother         Stoke x 2    Hyperlipidemia Mother         Hyperlipidemia    Cerebrovascular Disease Mother     Diabetes Type 2  Mother     Other - See Comments Father         Actinic keratoses    Hypertension Father         Hypertension    Hyperlipidemia Father         Hyperlipidemia    Diabetes Type 2  Father         Diabetes type II    Other - See Comments Sister         Gluten intolerance    Family History Negative Brother         Good Health     History   Drug Use No         Review of Systems    Review of Systems  Constitutional, neuro, ENT, endocrine, pulmonary, cardiac, gastrointestinal, genitourinary, musculoskeletal, integument and psychiatric systems are negative, except as otherwise noted.  Objective " "   /60   Pulse 90   Temp 97.9  F (36.6  C) (Temporal)   Resp 20   Ht 1.664 m (5' 5.5\")   Wt 73.5 kg (162 lb)   SpO2 98%   BMI 26.55 kg/m     Estimated body mass index is 26.55 kg/m  as calculated from the following:    Height as of this encounter: 1.664 m (5' 5.5\").    Weight as of this encounter: 73.5 kg (162 lb).  Physical Exam  GENERAL: alert and no distress  EYES: Eyes grossly normal to inspection, PERRL and conjunctivae and sclerae normal  HENT: ear canals and TM's normal, nose and mouth without ulcers or lesions  NECK: no adenopathy, no asymmetry, masses, or scars  RESP: lungs clear to auscultation - no rales, rhonchi or wheezes  CV: regular rate and rhythm, normal S1 S2, no S3 or S4, no murmur, click or rub, no peripheral edema  ABDOMEN: soft, nontender, no hepatosplenomegaly, no masses and bowel sounds normal  MS: no gross musculoskeletal defects noted, no edema  SKIN: no suspicious lesions or rashes  NEURO: Normal strength and tone, mentation intact and speech normal  PSYCH: mentation appears normal, affect normal/bright    Recent Labs   Lab Test 06/16/23  1434   HGB 13.3         POTASSIUM 4.2   CR 0.61   A1C 5.3        Diagnostics  Recent Results (from the past 720 hour(s))   Surgical Pathology Exam    Collection Time: 01/22/24  8:34 AM   Result Value Ref Range    Case Report       Surgical Pathology Report                         Case: [id number]                                  Authorizing Provider:  Oli Jackson MD           Collected:           01/22/2024 08:34 AM          Ordering Location:     [facility] and    Received:            01/22/2024 11:13 AM                                 Hospital                                                                     Pathologist:           Lab, External [facility]                                                                                   Pathologist                                                                  Specimens: "   A) - Small Intestine, Terminal Ileum, TERMINAL ILLEUM BIOPSIES                                      B) - Large Intestine, Colon, RIGHT COLON BIOPSIES                                                   C) - Large Intestine, Colon, LEFT COLON BIOPSIES                                                    D) - Rectum, RECTAL BIOPSIES                                                               Final Diagnosis       SEE SCANNED REPORT      Clinical Information       Procedure:  Colonoscopy WITH BIOPSIES  Pre-op Diagnosis: Change in bowel habits [R19.4]  Post-op Diagnosis: R19.4 - Change in bowel habits [ICD-10-CM]      Case Images     Hemoglobin    Collection Time: 02/09/24 11:57 AM   Result Value Ref Range    Hemoglobin 13.1 11.7 - 15.7 g/dL      No EKG required, no history of coronary heart disease, significant arrhythmia, peripheral arterial disease or other structural heart disease.    Revised Cardiac Risk Index (RCRI)  The patient has the following serious cardiovascular risks for perioperative complications:   - No serious cardiac risks = 0 points     RCRI Interpretation: 0 points: Class I (very low risk - 0.4% complication rate)         Signed Electronically by: Cherie Doe PA-C  Copy of this evaluation report is provided to requesting physician.

## 2024-02-09 NOTE — NURSING NOTE
"Chief Complaint   Patient presents with    Pre-Op Exam     DOS 03/05/24- [facility], Dr. Raman- hysterectomy  and Urology Dr. Kennedy      Also would like referral for hearing test through [facility]   Initial /60   Pulse 90   Temp 97.9  F (36.6  C) (Temporal)   Resp 20   Ht 1.664 m (5' 5.5\")   Wt 73.5 kg (162 lb)   BMI 26.55 kg/m   Estimated body mass index is 26.55 kg/m  as calculated from the following:    Height as of this encounter: 1.664 m (5' 5.5\").    Weight as of this encounter: 73.5 kg (162 lb).  Medication Review: complete    The next two questions are to help us understand your food security.  If you are feeling you need any assistance in this area, we have resources available to support you today.          2/9/2024   SDOH- Food Insecurity   Within the past 12 months, did you worry that your food would run out before you got money to buy more? N   Within the past 12 months, did the food you bought just not last and you didn t have money to get more? N         Health Care Directive:  Patient does not have a Health Care Directive or Living Will: Discussed advance care planning with patient; however, patient declined at this time.    Ana Luisa Rush, ANTONIETTA      "

## 2024-02-09 NOTE — COMMUNITY RESOURCES LIST (ENGLISH)
02/09/2024    [place] [employer]  N/A  For questions about this resource list or additional care needs, please contact your primary care clinic or care manager.  Phone: [phone]   Email: N/A   Address: [address] [place] [id number]   Hours: N/A        Financial Stability       Utility payment assistance  1  [employer] [facility] Main Office - Energy Assistance Program Distance: 6.3 miles      Phone/Virtual   [address] [facility] [address] [place] [address]  Language: English  Hours: Mon - Thu 8:00 AM - 4:30 PM , Fri 8:00 AM - 12:00 PM  Fees: Free   Phone: (302) 972-6720 Email: zoe@[employer].us Website: http://www.[employer].us          Important Numbers & Websites       Emergency Services   911  City Services   311  Poison Control   (129) 977-5144  Suicide Prevention Lifeline   (688) 320-4122 (TALK)  Child Abuse Hotline   (704) 113-3141 (4-A-Child)  Sexual Assault Hotline   (533) 965-4115 (HOPE)  National Runaway Safeline   (351) 942-6840 (RUNAWAY)  All-Options Talkline   (607) 568-6172  Substance Abuse Referral   (737) 523-1980 (HELP)

## 2024-03-01 ENCOUNTER — TELEPHONE (OUTPATIENT)
Dept: OBGYN | Facility: CLINIC | Age: 49
End: 2024-03-01
Payer: COMMERCIAL

## 2024-03-01 DIAGNOSIS — Z78.9 NEED FOR FOLLOW-UP BY SOCIAL WORKER: Primary | ICD-10-CM

## 2024-03-01 NOTE — TELEPHONE ENCOUNTER
Patient calling with concerns about her surgery next week. She lives very far away and her sister is bringing her for surgery.  She has a later surgery and wants to stay overnight in the hospital. She also wants her sister to stay in the hospital with her.  RN discussed this with Dr. Khanna and Juliet. The decision for staying overnight is done the day of surgery.  SW consult put in for the sister staying overnight with her. All questions answered.

## 2024-03-04 ENCOUNTER — PATIENT OUTREACH (OUTPATIENT)
Dept: CARE COORDINATION | Facility: CLINIC | Age: 49
End: 2024-03-04
Payer: COMMERCIAL

## 2024-03-04 ENCOUNTER — ANESTHESIA EVENT (OUTPATIENT)
Dept: SURGERY | Facility: CLINIC | Age: 49
End: 2024-03-04
Payer: COMMERCIAL

## 2024-03-04 ASSESSMENT — ACTIVITIES OF DAILY LIVING (ADL): DEPENDENT_IADLS:: INDEPENDENT

## 2024-03-04 NOTE — PROGRESS NOTES
Clinic Care Coordination Contact  OUTREACH    Referral Information:  Referral Source: Specialist - [facility]    Primary Diagnosis: Psychosocial    Chief Complaint   Patient presents with    Clinic Care Coordination - Initial        Universal Utilization: Bibiana is followed by Dr. Ramachandran at [facility] for primary care.  Bibiana is connected to Dr. Raman for OBGYN surgery.   Clinic Utilization  Difficulty keeping appointments:: No  Compliance Concerns: No  No-Show Concerns: No  No PCP office visit in Past Year: No  Utilization      No Show Count (past year)  0             ED Visits  0             Hospital Admissions  1                    Current as of: 3/1/2024 10:49 PM              PHYLLIS DOWNEY called and spoke with Bibiana; introduced self, discussed role of Care Coordination, and explained reason for call; referral received for lodging support for her and her sister if she is not admitted to the hospital following her procedure tomorrow, 3/5/24.  PHYLLIS DOWNEY offered to email her with list if discounted hotel rooms in [facility] [place] to use if needed.  Bibiana asked if she is admitted overnight in the hospital, could her sister stay with her in her room.  PHYLLIS DOWNEY unsure and stated PHYLLIS DOWNEY would reach out to the clinic RN to inquire further.  Bibiana confirmed her email in chart as [email or web address]  PHYLLIS DOWNEY stated PHYLLIS DOWNEY would follow up with her via email once PHYLLIS DOWNEY received an answer back about being admitted and sister staying with her.  Bibiana thanked for the call and help.   PHYLLIS DOWNEY sent Bibiana an email with discounted hotels in [facility] [place] and stated PYHLLIS DOWNEY would get back to her about her question.   PHYLLIS DOWNEY sent Olga Roberts, RN, secure EPIC chat with Bibiana's question.     Clinical Concerns:  Current Medical Concerns:    Patient Active Problem List   Diagnosis    Menstrual irregularity    Migraine headache    Right ankle instability    Bilateral sensorineural hearing loss       Current Behavioral Concerns: None noted     Education Provided to patient: PHYLLIS DOWNEY role / discounted hotel rates   Pain  Pain (GOAL):: No  Health Maintenance Reviewed: Not assessed  Clinical Pathway: None    Medication Management:  Medication review status: Did not review meds       Functional Status:  Dependent ADLs:: Independent  Dependent IADLs:: Independent  Bed or wheelchair confined:: No  Mobility Status: Independent  Fallen 2 or more times in the past year?: No  Any fall with injury in the past year?: No    Living Situation:  Current living arrangement:: I live in a private home    Lifestyle & Psychosocial Needs:    Social Determinants of Health     Food Insecurity: Low Risk  (2/9/2024)    Food Insecurity     Within the past 12 months, did you worry that your food would run out before you got money to buy more?: No     Within the past 12 months, did the food you bought just not last and you didn t have money to get more?: No   Depression: Not at risk (2/9/2024)    PHQ-2     PHQ-2 Score: 0   Housing Stability: Low Risk  (2/9/2024)    Housing Stability     Do you have housing? : Yes     Are you worried about losing your housing?: No   Tobacco Use: Low Risk  (2/9/2024)    Patient History     Smoking Tobacco Use: Never     Smokeless Tobacco Use: Never     Passive Exposure: Never   Financial Resource Strain: High Risk (2/9/2024)    Financial Resource Strain     Within the past 12 months, have you or your family members you live with been unable to get utilities (heat, electricity) when it was really needed?: Yes   Alcohol Use: Unknown (1/27/2020)    AUDIT-C     Frequency of Alcohol Consumption: Monthly or less     Average Number of Drinks: Not on file     Frequency of Binge Drinking: Not on file   Transportation Needs: Low Risk  (2/9/2024)    Transportation Needs     Within the past 12 months, has lack of transportation kept you from medical appointments, getting your medicines, non-medical meetings or appointments, work, or from getting things that you  need?: No   Physical Activity: Not on file   Interpersonal Safety: Low Risk  (2/9/2024)    Interpersonal Safety     Do you feel physically and emotionally safe where you currently live?: Yes     Within the past 12 months, have you been hit, slapped, kicked or otherwise physically hurt by someone?: No     Within the past 12 months, have you been humiliated or emotionally abused in other ways by your partner or ex-partner?: No   Stress: Not on file   Social Connections: Not on file        Transportation means:: Regular car     Informal Support system:: Friends, Family        Resources and Interventions:  Current Resources   Community Resources: None  Supplies Currently Used at Home: None  Equipment Currently Used at Home: none  Employment Status: employed full-time     Referrals Placed: None    The patient consented via Verbal consent to have contact information and resources sent via email in an unencrypted manner.     Patient/Caregiver understanding: Bibiana reports understanding and denies any additional questions or concerns at this times.   CC engaged in AIDET communication during encounter.       Future Appointments                In 1 week Jefferson Mills MD [facility]    In 2 weeks Dominique Anguiano PA-C [facility] [place], Susan Hibbin    In 2 weeks Annie Merchant PA-C [facility] [place], [facility]    In 2 weeks Maria Raman MD [facility]    In 1 month Ebenezer Kennedy MD [facility]    In 1 month Maria Raman MD [facility]            Plan:   - CC sent email to Bibiana with discounted hotels in [facility]  - CC sent Secure chat to RN with Bibiana's question.  - CC to email Bibiana back with answer once received.       JAMAL Perez ([place])  , Care Coordination  [facility]  [facility]  [facility]  [facility]  [email or web address]   Office: [phone]

## 2024-03-05 ENCOUNTER — HOSPITAL ENCOUNTER (OUTPATIENT)
Facility: CLINIC | Age: 49
Discharge: HOME | End: 2024-03-05
Attending: OBSTETRICS & GYNECOLOGY | Admitting: OBSTETRICS & GYNECOLOGY
Payer: COMMERCIAL

## 2024-03-05 ENCOUNTER — PATIENT OUTREACH (OUTPATIENT)
Dept: CARE COORDINATION | Facility: CLINIC | Age: 49
End: 2024-03-05
Payer: COMMERCIAL

## 2024-03-05 ENCOUNTER — ANESTHESIA (OUTPATIENT)
Dept: SURGERY | Facility: CLINIC | Age: 49
End: 2024-03-05
Payer: COMMERCIAL

## 2024-03-05 VITALS
TEMPERATURE: 98.2 F | OXYGEN SATURATION: 98 % | BODY MASS INDEX: 26.56 KG/M2 | HEIGHT: 65 IN | HEART RATE: 53 BPM | DIASTOLIC BLOOD PRESSURE: 74 MMHG | WEIGHT: 159.39 LBS | RESPIRATION RATE: 21 BRPM | SYSTOLIC BLOOD PRESSURE: 125 MMHG

## 2024-03-05 DIAGNOSIS — N39.3 STRESS INCONTINENCE DUE TO PELVIC ORGAN PROLAPSE: ICD-10-CM

## 2024-03-05 DIAGNOSIS — N81.11 CYSTOCELE, MIDLINE: ICD-10-CM

## 2024-03-05 DIAGNOSIS — N81.6 RECTOCELE: ICD-10-CM

## 2024-03-05 LAB
ABO/RH(D): NORMAL
ANTIBODY SCREEN: NEGATIVE
BASOPHILS # BLD AUTO: 0 10E3/UL (ref 0–0.2)
BASOPHILS NFR BLD AUTO: 1 %
EOSINOPHIL # BLD AUTO: 0 10E3/UL (ref 0–0.7)
EOSINOPHIL NFR BLD AUTO: 0 %
ERYTHROCYTE [DISTWIDTH] IN BLOOD BY AUTOMATED COUNT: 12.2 % (ref 10–15)
HCG SERPL QL: NEGATIVE
HCT VFR BLD AUTO: 38.8 % (ref 35–47)
HGB BLD-MCNC: 13 G/DL (ref 11.7–15.7)
IMM GRANULOCYTES # BLD: 0 10E3/UL
IMM GRANULOCYTES NFR BLD: 0 %
LYMPHOCYTES # BLD AUTO: 2.3 10E3/UL (ref 0.8–5.3)
LYMPHOCYTES NFR BLD AUTO: 44 %
MCH RBC QN AUTO: 31.3 PG (ref 26.5–33)
MCHC RBC AUTO-ENTMCNC: 33.5 G/DL (ref 31.5–36.5)
MCV RBC AUTO: 93 FL (ref 78–100)
MONOCYTES # BLD AUTO: 0.4 10E3/UL (ref 0–1.3)
MONOCYTES NFR BLD AUTO: 8 %
NEUTROPHILS # BLD AUTO: 2.5 10E3/UL (ref 1.6–8.3)
NEUTROPHILS NFR BLD AUTO: 47 %
NRBC # BLD AUTO: 0 10E3/UL
NRBC BLD AUTO-RTO: 0 /100
PLATELET # BLD AUTO: 300 10E3/UL (ref 150–450)
RBC # BLD AUTO: 4.16 10E6/UL (ref 3.8–5.2)
SPECIMEN EXPIRATION DATE: NORMAL
WBC # BLD AUTO: 5.4 10E3/UL (ref 4–11)

## 2024-03-05 PROCEDURE — 272N000001 HC OR GENERAL SUPPLY STERILE: Performed by: OBSTETRICS & GYNECOLOGY

## 2024-03-05 PROCEDURE — 250N000009 HC RX 250: Performed by: STUDENT IN AN ORGANIZED HEALTH CARE EDUCATION/TRAINING PROGRAM

## 2024-03-05 PROCEDURE — 250N000013 HC RX MED GY IP 250 OP 250 PS 637: Performed by: STUDENT IN AN ORGANIZED HEALTH CARE EDUCATION/TRAINING PROGRAM

## 2024-03-05 PROCEDURE — 250N000011 HC RX IP 250 OP 636: Mod: JZ | Performed by: OBSTETRICS & GYNECOLOGY

## 2024-03-05 PROCEDURE — 250N000011 HC RX IP 250 OP 636: Performed by: STUDENT IN AN ORGANIZED HEALTH CARE EDUCATION/TRAINING PROGRAM

## 2024-03-05 PROCEDURE — 258N000003 HC RX IP 258 OP 636: Performed by: STUDENT IN AN ORGANIZED HEALTH CARE EDUCATION/TRAINING PROGRAM

## 2024-03-05 PROCEDURE — 85025 COMPLETE CBC W/AUTO DIFF WBC: CPT | Performed by: OBSTETRICS & GYNECOLOGY

## 2024-03-05 PROCEDURE — 360N000080 HC SURGERY LEVEL 7, PER MIN: Performed by: OBSTETRICS & GYNECOLOGY

## 2024-03-05 PROCEDURE — C9290 INJ, BUPIVACAINE LIPOSOME: HCPCS | Performed by: STUDENT IN AN ORGANIZED HEALTH CARE EDUCATION/TRAINING PROGRAM

## 2024-03-05 PROCEDURE — 710N000012 HC RECOVERY PHASE 2, PER MINUTE: Performed by: OBSTETRICS & GYNECOLOGY

## 2024-03-05 PROCEDURE — 57425 LAPAROSCOPY SURG COLPOPEXY: CPT | Performed by: STUDENT IN AN ORGANIZED HEALTH CARE EDUCATION/TRAINING PROGRAM

## 2024-03-05 PROCEDURE — C1781 MESH (IMPLANTABLE): HCPCS | Performed by: OBSTETRICS & GYNECOLOGY

## 2024-03-05 PROCEDURE — 57425 LAPAROSCOPY SURG COLPOPEXY: CPT | Mod: GC | Performed by: UROLOGY

## 2024-03-05 PROCEDURE — 58571 TLH W/T/O 250 G OR LESS: CPT | Mod: GC | Performed by: OBSTETRICS & GYNECOLOGY

## 2024-03-05 PROCEDURE — 57425 LAPAROSCOPY SURG COLPOPEXY: CPT

## 2024-03-05 PROCEDURE — 84703 CHORIONIC GONADOTROPIN ASSAY: CPT | Performed by: OBSTETRICS & GYNECOLOGY

## 2024-03-05 PROCEDURE — 88305 TISSUE EXAM BY PATHOLOGIST: CPT | Mod: 26 | Performed by: PATHOLOGY

## 2024-03-05 PROCEDURE — 250N000025 HC SEVOFLURANE, PER MIN: Performed by: OBSTETRICS & GYNECOLOGY

## 2024-03-05 PROCEDURE — 88305 TISSUE EXAM BY PATHOLOGIST: CPT | Mod: TC | Performed by: OBSTETRICS & GYNECOLOGY

## 2024-03-05 PROCEDURE — 250N000013 HC RX MED GY IP 250 OP 250 PS 637: Performed by: OBSTETRICS & GYNECOLOGY

## 2024-03-05 PROCEDURE — 370N000017 HC ANESTHESIA TECHNICAL FEE, PER MIN: Performed by: OBSTETRICS & GYNECOLOGY

## 2024-03-05 PROCEDURE — 999N000141 HC STATISTIC PRE-PROCEDURE NURSING ASSESSMENT: Performed by: OBSTETRICS & GYNECOLOGY

## 2024-03-05 PROCEDURE — 86900 BLOOD TYPING SEROLOGIC ABO: CPT | Performed by: STUDENT IN AN ORGANIZED HEALTH CARE EDUCATION/TRAINING PROGRAM

## 2024-03-05 PROCEDURE — 710N000010 HC RECOVERY PHASE 1, LEVEL 2, PER MIN: Performed by: OBSTETRICS & GYNECOLOGY

## 2024-03-05 PROCEDURE — 250N000011 HC RX IP 250 OP 636: Performed by: UROLOGY

## 2024-03-05 PROCEDURE — 36415 COLL VENOUS BLD VENIPUNCTURE: CPT | Performed by: OBSTETRICS & GYNECOLOGY

## 2024-03-05 DEVICE — POLYPROPYLENE MESH FOR SACROCOLPOSUSPENSION/SACROCOLPOPEXY - Y
Type: IMPLANTABLE DEVICE | Site: PELVIS | Status: FUNCTIONAL
Brand: RESTORELLE

## 2024-03-05 RX ORDER — ONDANSETRON 4 MG/1
4 TABLET, ORALLY DISINTEGRATING ORAL EVERY 30 MIN PRN
Status: DISCONTINUED | OUTPATIENT
Start: 2024-03-05 | End: 2024-03-05 | Stop reason: HOSPADM

## 2024-03-05 RX ORDER — FENTANYL CITRATE 50 UG/ML
25-50 INJECTION, SOLUTION INTRAMUSCULAR; INTRAVENOUS
Status: DISCONTINUED | OUTPATIENT
Start: 2024-03-05 | End: 2024-03-05 | Stop reason: HOSPADM

## 2024-03-05 RX ORDER — SODIUM CHLORIDE, SODIUM LACTATE, POTASSIUM CHLORIDE, CALCIUM CHLORIDE 600; 310; 30; 20 MG/100ML; MG/100ML; MG/100ML; MG/100ML
INJECTION, SOLUTION INTRAVENOUS CONTINUOUS
Status: DISCONTINUED | OUTPATIENT
Start: 2024-03-05 | End: 2024-03-05 | Stop reason: HOSPADM

## 2024-03-05 RX ORDER — KETOROLAC TROMETHAMINE 15 MG/ML
15 INJECTION, SOLUTION INTRAMUSCULAR; INTRAVENOUS
Status: DISCONTINUED | OUTPATIENT
Start: 2024-03-05 | End: 2024-03-05 | Stop reason: HOSPADM

## 2024-03-05 RX ORDER — BUPIVACAINE HYDROCHLORIDE AND EPINEPHRINE 2.5; 5 MG/ML; UG/ML
INJECTION, SOLUTION INFILTRATION; PERINEURAL
Status: COMPLETED | OUTPATIENT
Start: 2024-03-05 | End: 2024-03-05

## 2024-03-05 RX ORDER — FENTANYL CITRATE 50 UG/ML
25 INJECTION, SOLUTION INTRAMUSCULAR; INTRAVENOUS EVERY 5 MIN PRN
Status: DISCONTINUED | OUTPATIENT
Start: 2024-03-05 | End: 2024-03-05 | Stop reason: HOSPADM

## 2024-03-05 RX ORDER — NALOXONE HYDROCHLORIDE 0.4 MG/ML
0.2 INJECTION, SOLUTION INTRAMUSCULAR; INTRAVENOUS; SUBCUTANEOUS
Status: DISCONTINUED | OUTPATIENT
Start: 2024-03-05 | End: 2024-03-05 | Stop reason: HOSPADM

## 2024-03-05 RX ORDER — HYDROMORPHONE HYDROCHLORIDE 1 MG/ML
0.2 INJECTION, SOLUTION INTRAMUSCULAR; INTRAVENOUS; SUBCUTANEOUS EVERY 5 MIN PRN
Status: DISCONTINUED | OUTPATIENT
Start: 2024-03-05 | End: 2024-03-05 | Stop reason: HOSPADM

## 2024-03-05 RX ORDER — NALOXONE HYDROCHLORIDE 0.4 MG/ML
0.1 INJECTION, SOLUTION INTRAMUSCULAR; INTRAVENOUS; SUBCUTANEOUS
Status: DISCONTINUED | OUTPATIENT
Start: 2024-03-05 | End: 2024-03-05 | Stop reason: HOSPADM

## 2024-03-05 RX ORDER — HYDROMORPHONE HYDROCHLORIDE 1 MG/ML
0.4 INJECTION, SOLUTION INTRAMUSCULAR; INTRAVENOUS; SUBCUTANEOUS EVERY 5 MIN PRN
Status: DISCONTINUED | OUTPATIENT
Start: 2024-03-05 | End: 2024-03-05 | Stop reason: HOSPADM

## 2024-03-05 RX ORDER — CEFAZOLIN SODIUM/WATER 2 G/20 ML
2 SYRINGE (ML) INTRAVENOUS SEE ADMIN INSTRUCTIONS
Status: DISCONTINUED | OUTPATIENT
Start: 2024-03-05 | End: 2024-03-05 | Stop reason: HOSPADM

## 2024-03-05 RX ORDER — LIDOCAINE HYDROCHLORIDE 20 MG/ML
INJECTION, SOLUTION INFILTRATION; PERINEURAL PRN
Status: DISCONTINUED | OUTPATIENT
Start: 2024-03-05 | End: 2024-03-05

## 2024-03-05 RX ORDER — ACETAMINOPHEN 325 MG/1
975 TABLET ORAL ONCE
Status: COMPLETED | OUTPATIENT
Start: 2024-03-05 | End: 2024-03-05

## 2024-03-05 RX ORDER — SENNA AND DOCUSATE SODIUM 50; 8.6 MG/1; MG/1
1 TABLET, FILM COATED ORAL AT BEDTIME
Qty: 7 TABLET | Refills: 0 | Status: SHIPPED | OUTPATIENT
Start: 2024-03-05 | End: 2024-03-12

## 2024-03-05 RX ORDER — KETOROLAC TROMETHAMINE 30 MG/ML
INJECTION, SOLUTION INTRAMUSCULAR; INTRAVENOUS PRN
Status: DISCONTINUED | OUTPATIENT
Start: 2024-03-05 | End: 2024-03-05

## 2024-03-05 RX ORDER — MEPERIDINE HYDROCHLORIDE 25 MG/ML
12.5 INJECTION INTRAMUSCULAR; INTRAVENOUS; SUBCUTANEOUS EVERY 5 MIN PRN
Status: DISCONTINUED | OUTPATIENT
Start: 2024-03-05 | End: 2024-03-05 | Stop reason: HOSPADM

## 2024-03-05 RX ORDER — METRONIDAZOLE 500 MG/100ML
500 INJECTION, SOLUTION INTRAVENOUS ONCE
Status: COMPLETED | OUTPATIENT
Start: 2024-03-05 | End: 2024-03-05

## 2024-03-05 RX ORDER — DEXAMETHASONE SODIUM PHOSPHATE 4 MG/ML
INJECTION, SOLUTION INTRA-ARTICULAR; INTRALESIONAL; INTRAMUSCULAR; INTRAVENOUS; SOFT TISSUE PRN
Status: DISCONTINUED | OUTPATIENT
Start: 2024-03-05 | End: 2024-03-05

## 2024-03-05 RX ORDER — LABETALOL HYDROCHLORIDE 5 MG/ML
10 INJECTION, SOLUTION INTRAVENOUS
Status: DISCONTINUED | OUTPATIENT
Start: 2024-03-05 | End: 2024-03-05 | Stop reason: HOSPADM

## 2024-03-05 RX ORDER — DIPHENHYDRAMINE HYDROCHLORIDE 50 MG/ML
25 INJECTION INTRAMUSCULAR; INTRAVENOUS EVERY 6 HOURS PRN
Status: DISCONTINUED | OUTPATIENT
Start: 2024-03-05 | End: 2024-03-05 | Stop reason: HOSPADM

## 2024-03-05 RX ORDER — NALOXONE HYDROCHLORIDE 0.4 MG/ML
0.4 INJECTION, SOLUTION INTRAMUSCULAR; INTRAVENOUS; SUBCUTANEOUS
Status: DISCONTINUED | OUTPATIENT
Start: 2024-03-05 | End: 2024-03-05 | Stop reason: HOSPADM

## 2024-03-05 RX ORDER — PROPOFOL 10 MG/ML
INJECTION, EMULSION INTRAVENOUS CONTINUOUS PRN
Status: DISCONTINUED | OUTPATIENT
Start: 2024-03-05 | End: 2024-03-05

## 2024-03-05 RX ORDER — KETAMINE HYDROCHLORIDE 10 MG/ML
INJECTION INTRAMUSCULAR; INTRAVENOUS PRN
Status: DISCONTINUED | OUTPATIENT
Start: 2024-03-05 | End: 2024-03-05

## 2024-03-05 RX ORDER — DIMENHYDRINATE 50 MG/ML
25 INJECTION, SOLUTION INTRAMUSCULAR; INTRAVENOUS
Status: DISCONTINUED | OUTPATIENT
Start: 2024-03-05 | End: 2024-03-05 | Stop reason: HOSPADM

## 2024-03-05 RX ORDER — OXYCODONE HYDROCHLORIDE 5 MG/1
5 TABLET ORAL EVERY 6 HOURS PRN
Qty: 12 TABLET | Refills: 0 | Status: SHIPPED | OUTPATIENT
Start: 2024-03-05 | End: 2024-03-05

## 2024-03-05 RX ORDER — GABAPENTIN 100 MG/1
300 CAPSULE ORAL
Status: COMPLETED | OUTPATIENT
Start: 2024-03-05 | End: 2024-03-05

## 2024-03-05 RX ORDER — CEFAZOLIN SODIUM/WATER 2 G/20 ML
2 SYRINGE (ML) INTRAVENOUS
Status: DISCONTINUED | OUTPATIENT
Start: 2024-03-05 | End: 2024-03-05

## 2024-03-05 RX ORDER — ONDANSETRON 2 MG/ML
4 INJECTION INTRAMUSCULAR; INTRAVENOUS EVERY 30 MIN PRN
Status: DISCONTINUED | OUTPATIENT
Start: 2024-03-05 | End: 2024-03-05 | Stop reason: HOSPADM

## 2024-03-05 RX ORDER — DIPHENHYDRAMINE HCL 25 MG
25 CAPSULE ORAL EVERY 6 HOURS PRN
Status: DISCONTINUED | OUTPATIENT
Start: 2024-03-05 | End: 2024-03-05 | Stop reason: HOSPADM

## 2024-03-05 RX ORDER — ACETAMINOPHEN 325 MG/1
975 TABLET ORAL ONCE
Status: DISCONTINUED | OUTPATIENT
Start: 2024-03-05 | End: 2024-03-05 | Stop reason: HOSPADM

## 2024-03-05 RX ORDER — CEFAZOLIN SODIUM/WATER 2 G/20 ML
2 SYRINGE (ML) INTRAVENOUS
Status: COMPLETED | OUTPATIENT
Start: 2024-03-05 | End: 2024-03-05

## 2024-03-05 RX ORDER — SODIUM CHLORIDE, SODIUM LACTATE, POTASSIUM CHLORIDE, CALCIUM CHLORIDE 600; 310; 30; 20 MG/100ML; MG/100ML; MG/100ML; MG/100ML
INJECTION, SOLUTION INTRAVENOUS CONTINUOUS PRN
Status: DISCONTINUED | OUTPATIENT
Start: 2024-03-05 | End: 2024-03-05

## 2024-03-05 RX ORDER — CEFAZOLIN SODIUM/WATER 2 G/20 ML
2 SYRINGE (ML) INTRAVENOUS SEE ADMIN INSTRUCTIONS
Status: DISCONTINUED | OUTPATIENT
Start: 2024-03-05 | End: 2024-03-05

## 2024-03-05 RX ORDER — HALOPERIDOL 5 MG/ML
1 INJECTION INTRAMUSCULAR
Status: DISCONTINUED | OUTPATIENT
Start: 2024-03-05 | End: 2024-03-05 | Stop reason: HOSPADM

## 2024-03-05 RX ORDER — PROPOFOL 10 MG/ML
INJECTION, EMULSION INTRAVENOUS PRN
Status: DISCONTINUED | OUTPATIENT
Start: 2024-03-05 | End: 2024-03-05

## 2024-03-05 RX ORDER — LIDOCAINE 40 MG/G
CREAM TOPICAL
Status: DISCONTINUED | OUTPATIENT
Start: 2024-03-05 | End: 2024-03-05 | Stop reason: HOSPADM

## 2024-03-05 RX ORDER — HYDRALAZINE HYDROCHLORIDE 20 MG/ML
2.5-5 INJECTION INTRAMUSCULAR; INTRAVENOUS EVERY 10 MIN PRN
Status: DISCONTINUED | OUTPATIENT
Start: 2024-03-05 | End: 2024-03-05 | Stop reason: HOSPADM

## 2024-03-05 RX ORDER — ONDANSETRON 2 MG/ML
INJECTION INTRAMUSCULAR; INTRAVENOUS PRN
Status: DISCONTINUED | OUTPATIENT
Start: 2024-03-05 | End: 2024-03-05

## 2024-03-05 RX ORDER — FENTANYL CITRATE 50 UG/ML
INJECTION, SOLUTION INTRAMUSCULAR; INTRAVENOUS PRN
Status: DISCONTINUED | OUTPATIENT
Start: 2024-03-05 | End: 2024-03-05

## 2024-03-05 RX ORDER — FENTANYL CITRATE 50 UG/ML
50 INJECTION, SOLUTION INTRAMUSCULAR; INTRAVENOUS EVERY 5 MIN PRN
Status: DISCONTINUED | OUTPATIENT
Start: 2024-03-05 | End: 2024-03-05 | Stop reason: HOSPADM

## 2024-03-05 RX ORDER — FLUMAZENIL 0.1 MG/ML
0.2 INJECTION, SOLUTION INTRAVENOUS
Status: DISCONTINUED | OUTPATIENT
Start: 2024-03-05 | End: 2024-03-05 | Stop reason: HOSPADM

## 2024-03-05 RX ORDER — OXYCODONE HYDROCHLORIDE 5 MG/1
5 TABLET ORAL EVERY 6 HOURS PRN
Qty: 12 TABLET | Refills: 0 | Status: SHIPPED | OUTPATIENT
Start: 2024-03-05 | End: 2024-03-21

## 2024-03-05 RX ADMIN — PROPOFOL 150 MG: 10 INJECTION, EMULSION INTRAVENOUS at 13:50

## 2024-03-05 RX ADMIN — BUPIVACAINE 10 ML: 13.3 INJECTION, SUSPENSION, LIPOSOMAL INFILTRATION at 13:59

## 2024-03-05 RX ADMIN — BUPIVACAINE HYDROCHLORIDE AND EPINEPHRINE BITARTRATE 20 ML: 2.5; .005 INJECTION, SOLUTION INFILTRATION; PERINEURAL at 13:59

## 2024-03-05 RX ADMIN — DIPHENHYDRAMINE HYDROCHLORIDE 25 MG: 50 INJECTION, SOLUTION INTRAMUSCULAR; INTRAVENOUS at 18:12

## 2024-03-05 RX ADMIN — Medication 20 MG: at 14:47

## 2024-03-05 RX ADMIN — GABAPENTIN 300 MG: 300 CAPSULE ORAL at 13:08

## 2024-03-05 RX ADMIN — KETOROLAC TROMETHAMINE 15 MG: 30 INJECTION, SOLUTION INTRAMUSCULAR at 16:56

## 2024-03-05 RX ADMIN — PROPOFOL 125 MCG/KG/MIN: 10 INJECTION, EMULSION INTRAVENOUS at 16:42

## 2024-03-05 RX ADMIN — METRONIDAZOLE 500 MG: 500 INJECTION, SOLUTION INTRAVENOUS at 13:44

## 2024-03-05 RX ADMIN — SODIUM CHLORIDE, POTASSIUM CHLORIDE, SODIUM LACTATE AND CALCIUM CHLORIDE: 600; 310; 30; 20 INJECTION, SOLUTION INTRAVENOUS at 17:23

## 2024-03-05 RX ADMIN — SODIUM CHLORIDE, POTASSIUM CHLORIDE, SODIUM LACTATE AND CALCIUM CHLORIDE: 600; 310; 30; 20 INJECTION, SOLUTION INTRAVENOUS at 13:37

## 2024-03-05 RX ADMIN — PROPOFOL 150 MCG/KG/MIN: 10 INJECTION, EMULSION INTRAVENOUS at 14:55

## 2024-03-05 RX ADMIN — ONDANSETRON 4 MG: 2 INJECTION INTRAMUSCULAR; INTRAVENOUS at 14:04

## 2024-03-05 RX ADMIN — Medication 10 MG: at 15:40

## 2024-03-05 RX ADMIN — Medication 2 G: at 13:47

## 2024-03-05 RX ADMIN — HYDROMORPHONE HYDROCHLORIDE 0.5 MG: 1 INJECTION, SOLUTION INTRAMUSCULAR; INTRAVENOUS; SUBCUTANEOUS at 14:42

## 2024-03-05 RX ADMIN — FENTANYL CITRATE 50 MCG: 50 INJECTION INTRAMUSCULAR; INTRAVENOUS at 17:34

## 2024-03-05 RX ADMIN — Medication 50 MG: at 13:49

## 2024-03-05 RX ADMIN — FENTANYL CITRATE 100 MCG: 50 INJECTION INTRAMUSCULAR; INTRAVENOUS at 13:49

## 2024-03-05 RX ADMIN — ONDANSETRON 4 MG: 2 INJECTION INTRAMUSCULAR; INTRAVENOUS at 16:52

## 2024-03-05 RX ADMIN — ACETAMINOPHEN 975 MG: 325 TABLET, FILM COATED ORAL at 13:07

## 2024-03-05 RX ADMIN — PROPOFOL 150 MCG/KG/MIN: 10 INJECTION, EMULSION INTRAVENOUS at 13:52

## 2024-03-05 RX ADMIN — SUGAMMADEX 200 MG: 100 INJECTION, SOLUTION INTRAVENOUS at 17:10

## 2024-03-05 RX ADMIN — HYDROMORPHONE HYDROCHLORIDE 0.5 MG: 1 INJECTION, SOLUTION INTRAMUSCULAR; INTRAVENOUS; SUBCUTANEOUS at 17:18

## 2024-03-05 RX ADMIN — DEXAMETHASONE SODIUM PHOSPHATE 6 MG: 4 INJECTION, SOLUTION INTRA-ARTICULAR; INTRALESIONAL; INTRAMUSCULAR; INTRAVENOUS; SOFT TISSUE at 14:01

## 2024-03-05 RX ADMIN — Medication 50 MG: at 13:52

## 2024-03-05 RX ADMIN — MIDAZOLAM 2 MG: 1 INJECTION INTRAMUSCULAR; INTRAVENOUS at 13:37

## 2024-03-05 RX ADMIN — FENTANYL CITRATE 50 MCG: 50 INJECTION INTRAMUSCULAR; INTRAVENOUS at 17:52

## 2024-03-05 RX ADMIN — POLYETHYLENE GLYCOL 400 AND PROPYLENE GLYCOL 1 DROP: 4; 3 SOLUTION/ DROPS OPHTHALMIC at 13:58

## 2024-03-05 RX ADMIN — Medication 10 MG: at 16:17

## 2024-03-05 RX ADMIN — LIDOCAINE HYDROCHLORIDE 80 MG: 20 INJECTION, SOLUTION INFILTRATION; PERINEURAL at 13:50

## 2024-03-05 RX ADMIN — PHENYLEPHRINE HYDROCHLORIDE 100 MCG: 10 INJECTION INTRAVENOUS at 13:50

## 2024-03-05 ASSESSMENT — ACTIVITIES OF DAILY LIVING (ADL)
ADLS_ACUITY_SCORE: 31
ADLS_ACUITY_SCORE: 32
ADLS_ACUITY_SCORE: 31
ADLS_ACUITY_SCORE: 31
ADLS_ACUITY_SCORE: 32
ADLS_ACUITY_SCORE: 31
ADLS_ACUITY_SCORE: 32
ADLS_ACUITY_SCORE: 31
ADLS_ACUITY_SCORE: 29
ADLS_ACUITY_SCORE: 31
ADLS_ACUITY_SCORE: 31

## 2024-03-05 ASSESSMENT — LIFESTYLE VARIABLES: TOBACCO_USE: 0

## 2024-03-05 NOTE — OP NOTE
Gynecology Operative Note    Bibiana Lira  [id number]     Date of Surgery: 3/5/2024     Surgeon: Maria Raman MD     Assistants: Steven Villarreal MD PGY-4     Pre-operative Diagnoses: Pelvic organ prolapse     Post-operative Diagnoses: Same as above, s/p below stated procedure     Procedure: Robot-assisted total laparoscopic hysterectomy, bilateral salpingectomy     Anesthesia: GETA and TAP block     EBL: 75cc  IVF: 400cc  UO: 350cc     Complications: None  Findings: Bimanual exam with small, anteverted uterus. Speculum exam with normal external genital architecture, normal appearing vagina and parous cervix. Laparoscopic view with no evidence of injury on abdominal entry, uterus normal, normal appearing bilateral tubes and ovaries.     Specimens:   ID Type Source Tests Collected by Time Destination   1 : UTERUS, CERVIX, BILATERAL FALLOPIAN TUBES Tissue Uterus, Cervix, Bilateral Fallopian Tubes SURGICAL PATHOLOGY EXAM Maria Raman MD 3/5/2024  3:15 PM         Indications: Bibiana Lira is a 48 year old  who presented to clinic with pelvic organ prolapse. She was offered surgical management and she desired to proceed. She was counseled on the risks, benefits, and alternatives and desired to proceed. Written informed consent was signed.     Procedure Details: After obtaining informed consent, the patient was brought to the operating room where adequate general anesthesia was administered.  She was placed in the dorsal lithotomy position, and exam under anesthesia revealed the findings noted above. She was prepped and draped in the usual sterile fashion, and a Cardoso catheter was placed. A surgical timeout was performed.  A sterile speculum was placed and the cervix was dilated to 7mm with Hegar dilators and an Advincsaambaa arch manipulator was placed with a 3.5cm KOH cup. The speculum was removed. An 8mm incision was made superior to the umbilicus. The Veress needle was placed, and intraperitoneal  placement was suggested by the saline drop test and an opening pressure of less than 5mmHg.  The abdomen was then insufflated to a maximum pressure of 12mmHg. The Veress needle was removed and an 8mm robotic trocar was placed.  The laparoscope was placed, and a survey of the abdomen revealed the findings noted above. No trauma from entry was noted. Attention was turned to the right lower quadrant. At a point roughly 4cm superomedial to the ASIS, an 8mm incision was made, and an 8mm trocar was placed under direct visualization.  Attention was then turned to the left lower quadrant. Similarly, at a point 4cm superomedial to the ASIS, an 8mm incision was made, and an 8mm trocar was placed under direct visualization. Another robotic trocar was placed nursing home between the lateral port and the umbilical port. An additional 8mm assistant port was placed under direct visualization in the right upper quadrant. No local anesthetic was used due to preoperative TAP block placement. The patient was placed in steep Trendelenburg position and the robot was docked. The monopolar scissors were placed in the right arm and the fenestrated bipolar forceps were placed in the left arm.     At this point, bilateral ureters were identified transperitoneally, and noted to peristalse. The right mesosalpinx was cauterized and ligated in a stepwise fashion toward the cornua until the tube was excised. The right round ligament was grasped, elevated and transected using cautery. The utero-ovarian vessels were also cauterized in stepwise fashion toward the round ligament. Anterior and posterior leaves of the broad ligament were then dissected to the level of uterine artery. A bladder flap was then created by dissecting the vesicouterine peritoneum away from cervix using sharp dissection with the scissors followed by monopolar cautery and blunt dissection once the appropriate plane could be identified. The uterine artery was identified, cauterized  and cut. The left mesosalpinx was cauterized and ligated in a stepwise fashion toward the cornua until the tube was excised. The left round ligament was grasped, elevated and transected using cautery. The utero-ovarian vessels were also cauterized in stepwise fashion toward the round ligament. Anterior and posterior leaves of the broad ligament were then dissected to the level of the uterine artery. The uterine artery was identified, cauterized and cut.     Bilateral cardinal ligaments were cauterized and transected using cautery with care to avoid the ureters laterally. Monopolar cautery was used to create the colpotomy until the uterus was completely amputated at the level of the vaginal cuff along the KOH ring, and the specimen was removed through the vagina. A balloon filled with air was inserted into the vagina to maintain pneumoperitoneum. The vaginal cuff was then irrigated and closed using 2-0 VLoc suture in a running fashion. Good hemostasis was noted at all excision sites. Case was then turned over to Dr. Kenneyd of urology, see separate operative note.      Dr. Raman was present and scrubbed for the entire procedure.    Steven Villarreal MD  Obstetrics, Gynecology, and Women's Health  PGY4  3:46 PM 03/05/2024    I was present and scrubbed throughout the procedure,  I agree with the note above  Maria Raman MD

## 2024-03-05 NOTE — ANESTHESIA PREPROCEDURE EVALUATION
"Anesthesia Pre-Procedure Evaluation    Patient: Bibiana Lira   MRN: [id number] : 1975        Procedure : Procedure(s):  HYSTERECTOMY, TOTAL, ROBOT-ASSISTED, WITH BILATERAL SALPINGECTOMY  SACROCOLPOPEXY, ROBOT-ASSISTED, LAPAROSCOPIC, WITH INSERTION OF MIDURETHRAL SLING AND CYSTOSCOPY (support the bladder, rectum, and urethra with mesh and look in the bladder)          Past Medical History:   Diagnosis Date    SAM II (cervical intraepithelial neoplasia II) 2016    LEEP completed    Gastroesophageal reflux disease     Irritable bowel syndrome without diarrhea     avoids caramel color; gluten free    Personal history of other medical treatment (CODE)     , both vaginal deliveries.  Prolapsed uterus with first delivery.    Stomach ulcer 2019      Past Surgical History:   Procedure Laterality Date    COLONOSCOPY N/A 2019    F/U  normal    COLONOSCOPY N/A 2024    F/U  normal robert no colitis    ENDOSCOPY  2019    Pyloric ulcer    ESOPHAGOSCOPY, GASTROSCOPY, DUODENOSCOPY (EGD), COMBINED N/A 2019    Procedure: ESOPHAGOGASTRODUODENOSCOPY, WITH BIOPSY;  Surgeon: Oli Jackson MD;  Location: [facility] OR    EXTRACTION(S) DENTAL      [place] teeth extraction    LEEP TX, CERVICAL  2016    OTHER SURGICAL HISTORY      10/26/2017,[id number],ANKLE SURGERY,ligamentous repair; Dr. Ren      Allergies   Allergen Reactions    Food Diarrhea and GI Disturbance    Gluten Meal GI Disturbance     \"flu like symptoms\"    Lactase-Lactobacillus Fatigue and GI Disturbance    [place] Headache     migraine    Sulfa Antibiotics      Verified in Meditech: Y, Severity in Meditech: S  Other reaction(s): Erythema    Tilactase GI Disturbance     AND fatigue      Social History     Tobacco Use    Smoking status: Never     Passive exposure: Never    Smokeless tobacco: Never   Substance Use Topics    Alcohol use: Yes     Comment:  rare      Wt Readings from Last 1 Encounters:   24 73.5 kg (162 lb)    " "    Anesthesia Evaluation   Pt has had prior anesthetic. Type: MAC.        ROS/MED HX  ENT/Pulmonary: Comment:   Bilateral sensorineural hearing loss   (-) tobacco use   Neurologic:     (+)      migraines,                          Cardiovascular:       METS/Exercise Tolerance: >4 METS    Hematologic:  - neg hematologic  ROS     Musculoskeletal: Comment: Right ankle instability per chart      GI/Hepatic: Comment:   Irritable bowel syndrome  GERD  Hx/o peptic ulcer disease  On Famotidine      Renal/Genitourinary: Comment: , both vaginal deliveries.    Prolapsed uterus since first delivery.  Now complicated with  Stress incontinence, Cystocele, and Rectocele        Endo:  - neg endo ROS     Psychiatric/Substance Use:       Infectious Disease:  - neg infectious disease ROS     Malignancy: Comment: SAM II, s/p LEEP      Other:            Physical Exam    Airway  airway exam normal           Respiratory Devices and Support         Dental           Cardiovascular   cardiovascular exam normal          Pulmonary   pulmonary exam normal                OUTSIDE LABS:  CBC:   Lab Results   Component Value Date    WBC 6.0 2023    WBC 6.3 12/10/2020    HGB 13.1 2024    HGB 13.3 2023    HCT 39.1 2023    HCT 38.5 12/10/2020     2023     12/10/2020     BMP:   Lab Results   Component Value Date     2023     12/10/2020    POTASSIUM 4.2 2023    POTASSIUM 3.6 12/10/2020    CHLORIDE 105 2023    CHLORIDE 104 12/10/2020    CO2 27 2023    CO2 25 12/10/2020    BUN 11.9 2023    BUN 9 12/10/2020    CR 0.61 2023    CR 0.67 12/10/2020     (H) 2023     12/10/2020     COAGS: No results found for: \"PTT\", \"INR\", \"FIBR\"  POC:   Lab Results   Component Value Date    HCG Negative 2019     HEPATIC:   Lab Results   Component Value Date    ALBUMIN 4.3 12/10/2020    PROTTOTAL 7.4 12/10/2020    ALT 16 12/10/2020    AST 18 12/10/2020    " "ALKPHOS 69 12/10/2020    BILITOTAL 0.5 12/10/2020     OTHER:   Lab Results   Component Value Date    A1C 5.3 06/16/2023    ARVIND 9.4 06/16/2023    LIPASE 18 12/16/2019    AMYLASE 20 (L) 12/16/2019    T4 0.61 04/09/2018       Anesthesia Plan    ASA Status:  2       Anesthesia Type: General.     - Airway: ETT   Induction: Intravenous.   Maintenance: Balanced.   Techniques and Equipment:     - Lines/Monitors: 2nd IV     Consents    Anesthesia Plan(s) and associated risks, benefits, and realistic alternatives discussed. Questions answered and patient/representative(s) expressed understanding.     - Discussed:     - Discussed with:  Patient      - Extended Intubation/Ventilatory Support Discussed: No.      - Patient is DNR/DNI Status: No     Use of blood products discussed: No .     Postoperative Care    Pain management: Oral pain medications, IV analgesics, Multi-modal analgesia, Peripheral nerve block (Single Shot).   PONV prophylaxis: Dexamethasone or Solumedrol, Ondansetron (or other 5HT-3)     Comments:               Naty Meyer MD    I have reviewed the pertinent notes and labs in the chart from the past 30 days and (re)examined the patient.  Any updates or changes from those notes are reflected in this note.              # Overweight: Estimated body mass index is 26.55 kg/m  as calculated from the following:    Height as of 2/9/24: 1.664 m (5' 5.5\").    Weight as of 2/9/24: 73.5 kg (162 lb).      "

## 2024-03-05 NOTE — ANESTHESIA PROCEDURE NOTES
Airway       Patient location during procedure: OR       Procedure Start/Stop Times: 3/5/2024 1:55 PM  Staff -        Performed By: SRNA  Consent for Airway        Urgency: elective  Indications and Patient Condition       Indications for airway management: reena-procedural       Induction type:intravenous       Mask difficulty assessment: 1 - vent by mask    Final Airway Details       Final airway type: endotracheal airway       Successful airway: ETT - single  Endotracheal Airway Details        ETT size (mm): 7.0       Cuffed: yes       Successful intubation technique: direct laryngoscopy       DL Blade Type: MAC 3       Grade View of Cords: 1       Adjucts: stylet       Position: Right       Measured from: lips       Secured at (cm): 22       Bite block used: None    Post intubation assessment        Placement verified by: capnometry, equal breath sounds and chest rise        Number of attempts at approach: 1       Secured with: tape       Ease of procedure: easy       Dentition: Intact and Unchanged    Medication(s) Administered   Medication Administration Time: 3/5/2024 1:55 PM

## 2024-03-05 NOTE — ANESTHESIA CARE TRANSFER NOTE
Patient: Bibiana Lira    Procedure: Procedure(s):  HYSTERECTOMY, TOTAL, ROBOT-ASSISTED, WITH BILATERAL SALPINGECTOMY  SACROCOLPOPEXY, ROBOT-ASSISTED, LAPAROSCOPIC, WITH CYSTOSCOPY (support the bladder, rectum, and urethra with mesh and look in the bladder)       Diagnosis: Stress incontinence due to pelvic organ prolapse [N39.3]  Cystocele, midline [N81.11]  Rectocele [N81.6]  Diagnosis Additional Information: No value filed.    Anesthesia Type:   General     Note:    Oropharynx: oropharynx clear of all foreign objects and spontaneously breathing  Level of Consciousness: drowsy  Oxygen Supplementation: face mask  Level of Supplemental Oxygen (L/min / FiO2): 8  Independent Airway: airway patency satisfactory and stable  Dentition: dentition unchanged  Vital Signs Stable: post-procedure vital signs reviewed and stable  Report to RN Given: handoff report given  Patient transferred to: PACU    Handoff Report: Identifed the Patient, Identified the Reponsible Provider, Reviewed the pertinent medical history, Discussed the surgical course, Reviewed Intra-OP anesthesia mangement and issues during anesthesia, Set expectations for post-procedure period and Allowed opportunity for questions and acknowledgement of understanding      Vitals:  Vitals Value Taken Time   /53  03/05/24 1727   Temp 36.4 C 03/05/24 1727   Pulse 74 03/05/24 1727   Resp 16 03/05/24 1727   SpO2 100 % 03/05/24 1727   Vitals shown include unfiled device data.    Electronically Signed By: DEONTE Joseph CRNA  March 5, 2024  5:28 PM

## 2024-03-05 NOTE — OP NOTE
Operative Report  3/5/24    Pre-op Dx: Pelvic organ prolapse    Post-op Dx: Same     Procedure performed:   1. Robotic sacrocolpopexy   2. Cystoscopy    Surgeon: Ebenezer Kennedy MD   Assistant surgeons: Rose Graham MD    Anesthesia: general   EBL: 10 cc   Complications: None   Disposition: Stable to PACU     Clinical Indication: Ms. Bibiana Lira is a 48 year old female presenting with grade 4 cystocele and grade 3 rectocele, as well as stress urinary incontinence. She presented for repair and we discussed different management options including transvaginal repair and sacrocolpopexy and she elected to proceed with the latter robotically. She is also planned to undergo hysterectomy with gynecology and possible mid-urethral sling if HELGA is revealed.     Operative Procedure:  The procedure was began by the gynecology team access to the abdomen and completed the robotic assisted hysterectomy with bilateral salpingo-oophorectomy.  After the portion of the case was complete, was turned over to the urology team to complete sacrocolpopexy cystoscopy, and possible mid urethral sling.  A timeout was performed before beginning to confirm the patient, procedure, and operative site.      At the console with a grasper in the left hand and a hot mendez in the right, dissection of the bladder off the vaginal was carried out with the aid of a uterine positioning device in the vagina.  Once an adequate length of dissection was achieved the same was done posteriorly towards the rectum again with the aid of an EEA sizer in the rectum to help delineate planes.  At this point the sacral promontory was exposed with its anterior ligament and using 0-gortex, 2 anchoring sutures were placed. We then dissected through the peritoneum to develop a plane from that point to the vaginal cuff. Once this was performed the Y-mesh was then trimmed to an appropriate length and suturing of the mesh started on the posterior vaginal side. 0 V-loc  sutures was used to place an appropriate amount of sutures and secure the mesh in place.  The same exact thing was done anteriorly.   We then brought the vaginal apex to a non-tensioned length and extended the mesh to the sacral promontory and secured into place using 0 Gortex sutures. Next the peritoneal reflection was then closed over the mesh and retroperitonealized using a 3-0 Stratafix.      We then placed a 22-Mosotho cystoscope through the urethra and into the bladder, noting bilateral ureteral orifices effluxing clear urine. The floor of the bladder was intact as well as the bladder neck and the lateral edges. There was no evidence of anchoring suture visible in the bladder. The scope was removed, and then we applied pressure to the full bladder intraperitoneally, and noted no urinary incontinence. Therefore, we opted against placing a mid-urethral sling. The catheter was replaced. The robot was undocked and ports were removed. Skin closure was then performed on all the port sites using a 4-0 Monocryl and dermabond was applied.  The patient was then returned to the supine position and transported to recovery in stable condition.     Post-operative plan:   - Discharge today if meeting PACU criteria  - TOV in PACU  - Follow up as scheduled with urology and gynecology     Patient was seen, evaluated and plan was formulated in conjunction with me and I agree with the above.  I was present for the entire procedure.  Ebenezer Kennedy MD

## 2024-03-05 NOTE — ANESTHESIA PROCEDURE NOTES
"TAP Procedure Note    Pre-Procedure   Staff -        Anesthesiologist:  Ric Adame MD       Performed By: anesthesiologist       Location: OR       Pre-Anesthestic Checklist: patient identified, IV checked, site marked, risks and benefits discussed, informed consent, monitors and equipment checked, pre-op evaluation, at physician/surgeon's request and post-op pain management  Timeout:       Correct Patient: Yes        Correct Procedure: Yes        Correct Site: Yes        Correct Position: Yes        Correct Laterality: Yes        Site Marked: Yes  Procedure Documentation  Procedure: TAP       Laterality: bilateral       Patient Position: supine       Patient Prep/Sterile Barriers: sterile gloves, mask       Skin prep: Chloraprep       Needle Type: short bevel       Needle Gauge: 21.        Needle Length (millimeters): 100        Ultrasound guided       1. Ultrasound was used to identify targeted nerve, plexus, vascular marker, or fascial plane and place a needle adjacent to it in real-time.       2. Ultrasound was used to visualize the spread of anesthetic in close proximity to the above referenced structure.       3. A permanent image is entered into the patient's record.       4. The visualized anatomic structures appeared normal.       5. There were no apparent abnormal pathologic findings.    Assessment/Narrative         The placement was negative for: blood aspirated, painful injection and site bleeding       Paresthesias: No.       Bolus given via needle..        Secured via.        Insertion/Infusion Method: Single Shot       Complications: none    Medication(s) Administered   Bupivacaine 0.25% w/ 1:200K Epi (Injection) - Injection   20 mL - 3/5/2024 1:59:00 PM  Bupivacaine liposome (Exparel) 1.3% LA inj susp (Infiltration) - Infiltration   10 mL - 3/5/2024 1:59:00 PM    FOR [facility] ([facility]) ONLY:   Pain Team Contact information: please page the Pain Team Via [employer]. Search \"Pain\". During " daytime hours, please page the attending first. At night please page the resident first.

## 2024-03-05 NOTE — PROGRESS NOTES
Clinic Care Coordination Contact  Brief Contact    Clinical Data: PHYLLIS  Outreach  Outreach on 03/05/24:  PHYLLIS DOWNEY called and spoke with Bibiana; introduced self and explained reason for call; confirm receipt of email with discounted hotels.  Bibiana confirmed she received the email from PHYLLIS  and thanked for the information.  PHYLLIS  informed Bibiana that PHYLLIS CC inquired about whether her sister could stay in her hospital room if she is admitted overnight, and the RN confirmed she could not.  Bibiana stated that would be fine and would use the email to find a hotel.  Bibiana inquired if there was one [facility] would recommend.  [facility] stated that the two most popular with patients/families is the [employer] or [place] on [address].  PHYLLIS CC commented both have shuttles to go back and forth from the hospital, and noted the [place] shuttle runs later around 10:30pm;  CC advised to confirm the timing with the hotel in case it has changed.  Bibiana thanked PHYLLIS  for all the help.     Status: Closed, resources given and no ongoing needs.     Plan: No further outreaches will be made at this time unless a new referral is made or a change in the patient's status occurs.  Bibiana was provided with [facility] contact information and encouraged to call with any questions or concerns.        JAMAL Perez ([place])  , Care Coordination  [facility]  [facility]  [facility]  [email or web address]   Office: [phone]

## 2024-03-06 NOTE — ANESTHESIA POSTPROCEDURE EVALUATION
Patient: Bibiana Lira    Procedure: Procedure(s):  HYSTERECTOMY, TOTAL, ROBOT-ASSISTED, WITH BILATERAL SALPINGECTOMY  SACROCOLPOPEXY, ROBOT-ASSISTED, LAPAROSCOPIC, WITH CYSTOSCOPY (support the bladder, rectum, and urethra with mesh and look in the bladder)       Anesthesia Type:  General    Note:  Disposition: Inpatient   Postop Pain Control: Uneventful            Sign Out: Well controlled pain   PONV: No   Neuro/Psych: Uneventful            Sign Out: Acceptable/Baseline neuro status   Airway/Respiratory: Uneventful            Sign Out: Acceptable/Baseline resp. status   CV/Hemodynamics: Uneventful            Sign Out: Acceptable CV status; No obvious hypovolemia; No obvious fluid overload   Other NRE:    DID A NON-ROUTINE EVENT OCCUR?        Last vitals:  Vitals Value Taken Time   /74 03/05/24 1845   Temp 36.5  C (97.7  F) 03/05/24 1815   Pulse 53 03/05/24 1845   Resp 21 03/05/24 1830   SpO2 97 % 03/05/24 1845       Electronically Signed By: Tanner Enamorado MD  March 5, 2024  8:22 PM

## 2024-03-06 NOTE — DISCHARGE INSTRUCTIONS
Same-Day Surgery   Adult Discharge Orders & Instructions     For 24 hours after surgery:  Get plenty of rest.  A responsible adult must stay with you for at least 24 hours after you leave the hospital.   Pain medication can slow your reflexes. Do not drive or use heavy equipment.  If you have weakness or tingling, don't drive or use heavy equipment until this feeling goes away.  Mixing alcohol and pain medication can cause dizziness and slow your breathing. It can even be fatal. Do not drink alcohol while taking pain medication.  Avoid strenuous or risky activities.  Ask for help when climbing stairs.   You may feel lightheaded.  If so, sit for a few minutes before standing.  Have someone help you get up.   If you have nausea (feel sick to your stomach), drink only clear liquids such as apple juice, ginger ale, broth or 7-Up.  Rest may also help.  Be sure to drink enough fluids.  Move to a regular diet as you feel able. Take pain medications with a small amount of solid food, such as toast or crackers, to avoid nausea.   A slight fever is normal. Call the doctor if your fever is over 100 F (37.7 C) (taken under the tongue) or lasts longer than 24 hours.  You may have a dry mouth, muscle aches, trouble sleeping or a sore throat.  These symptoms should go away after 24 hours.  Do not make important or legal decisions.   Pain Management:      1. Take pain medication (if prescribed) for pain as directed by your physician.        2. WARNING: If the pain medication you have been prescribed contains Tylenol  (acetaminophen), DO NOT take additional doses of Tylenol (acetaminophen).     Call your doctor for any of the followin.  Signs of infection (fever, growing tenderness at the surgery site, severe pain, a large amount of drainage or bleeding, foul-smelling drainage, redness, swelling).    2.  It has been over 8 to 10 hours since surgery and you are still not able to urinate (pee).    3.  Headache for over 24  · Stable sacral wound noted upon exam   No erythema, drainage -local care and off weighting hours.    4.  Numbness, tingling or weakness the day after surgery (if you had spinal anesthesia).  To contact a doctor, call Dr. Maria Raman at [facility] [phone] or:  '   [phone] and ask for the Resident On Call for:          OB/GYN (answered 24 hours a day)  '   Emergency Department:  [facility]: [phone]  [facility]: [phone]               Rev. 10/2014

## 2024-03-11 LAB
PATH REPORT.COMMENTS IMP SPEC: NORMAL
PATH REPORT.COMMENTS IMP SPEC: NORMAL
PATH REPORT.FINAL DX SPEC: NORMAL
PATH REPORT.GROSS SPEC: NORMAL
PATH REPORT.MICROSCOPIC SPEC OTHER STN: NORMAL
PATH REPORT.RELEVANT HX SPEC: NORMAL
PHOTO IMAGE: NORMAL

## 2024-03-13 ENCOUNTER — OFFICE VISIT (OUTPATIENT)
Dept: ORTHOPEDICS | Facility: OTHER | Age: 49
End: 2024-03-13
Attending: PHYSICIAN ASSISTANT
Payer: COMMERCIAL

## 2024-03-13 VITALS
OXYGEN SATURATION: 98 % | HEIGHT: 65 IN | DIASTOLIC BLOOD PRESSURE: 72 MMHG | HEART RATE: 81 BPM | BODY MASS INDEX: 26.56 KG/M2 | WEIGHT: 159.4 LBS | RESPIRATION RATE: 18 BRPM | SYSTOLIC BLOOD PRESSURE: 116 MMHG

## 2024-03-13 DIAGNOSIS — G89.29 CHRONIC RIGHT SHOULDER PAIN: ICD-10-CM

## 2024-03-13 DIAGNOSIS — M25.511 CHRONIC RIGHT SHOULDER PAIN: ICD-10-CM

## 2024-03-13 PROCEDURE — 99204 OFFICE O/P NEW MOD 45 MIN: CPT | Performed by: ORTHOPAEDIC SURGERY

## 2024-03-13 NOTE — PROGRESS NOTES
Surgical Clinic Consult  Primary physician:     Margoth Ramachandran    Chief complaint:   Right shoulder pain and stiffness    History of present illness:  This is a 48 year old female I am seeing in consultation for chronic right shoulder pain and stiffness.  Patient had MRI scan done showing rotator cuff tendinitis and impingement and AC arthrosis plus labral fraying.  Patient has been doing therapeutic exercises with lack of gains on functionality.  Patient has significant limitations in her function at this point.  Patient reports activities overhead certainly do cause significant difficulty.  Patient is here for next steps and options at this time.    Past medical history:   Past Medical History:   Diagnosis Date    SAM II (cervical intraepithelial neoplasia II)     LEEP completed    Gastroesophageal reflux disease     Irritable bowel syndrome without diarrhea     avoids caramel color; gluten free    Personal history of other medical treatment (CODE)     , both vaginal deliveries.  Prolapsed uterus with first delivery.    Stomach ulcer 2019       Pastsurgical history:  Past Surgical History:   Procedure Laterality Date    COLONOSCOPY N/A 2019    F/U  normal    COLONOSCOPY N/A 2024    F/U  normal robert no colitis    DAVINCI HYSTERECTOMY TOTAL, SALPINGECTOMY BILATERAL Bilateral 3/5/2024    Procedure: HYSTERECTOMY, TOTAL, ROBOT-ASSISTED, WITH BILATERAL SALPINGECTOMY;  Surgeon: Maria Raman MD;  Location: [facility] OR    DAVINCI SACROCOLPOPEXY, CYSTOSCOPY, COMBINED N/A 3/5/2024    Procedure: SACROCOLPOPEXY, ROBOT-ASSISTED, LAPAROSCOPIC, WITH CYSTOSCOPY;  Surgeon: Ebenezer Kennedy MD;  Location: [facility] OR    ENDOSCOPY  2019    Pyloric ulcer    ESOPHAGOSCOPY, GASTROSCOPY, DUODENOSCOPY (EGD), COMBINED N/A 2019    Procedure: ESOPHAGOGASTRODUODENOSCOPY, WITH BIOPSY;  Surgeon: Oli Jackson MD;  Location: [facility] OR    EXTRACTION(S) DENTAL      [place] teeth extraction    LEEP TX, CERVICAL  " 2016    OTHER SURGICAL HISTORY      10/26/2017,[id number],ANKLE SURGERY,ligamentous repair; Dr. Ren       Current medications:  Current Outpatient Medications   Medication Sig Dispense Refill    famotidine (PEPCID) 20 MG tablet Take 1 tablet (20 mg) by mouth 2 times daily as needed (heartburn) 60 tablet 3    latanoprost (XALATAN) 0.005 % ophthalmic solution Place 1 drop into both eyes daily      metroNIDAZOLE (METROCREAM) 0.75 % external cream Apply topically 2 times daily 45 g 3    oxyCODONE (ROXICODONE) 5 MG tablet Take 1 tablet (5 mg) by mouth every 6 hours as needed for pain 12 tablet 0    polyethylene glycol-electrolytes (NULYTELY) 420 g solution Take 4,000 mLs by mouth once         Allergies:  Allergies   Allergen Reactions    Food Diarrhea and GI Disturbance    Gluten Meal GI Disturbance     \"flu like symptoms\"    Lactase-Lactobacillus Fatigue and GI Disturbance    Thurmond Oil Headache     migraine    Sulfa Antibiotics      Verified in Meditech: Y, Severity in [employer]: S  Other reaction(s): Erythema    Tilactase GI Disturbance     AND fatigue       Family history:  Family History   Problem Relation Age of Onset    Diabetes Mother         Diabetes    Hypertension Mother         Hypertension    Heart Disease Mother         Heart Disease,pacemaker    Other - See Comments Mother         Stoke x 2    Hyperlipidemia Mother         Hyperlipidemia    Cerebrovascular Disease Mother     Diabetes Type 2  Mother     Other - See Comments Father         Actinic keratoses    Hypertension Father         Hypertension    Hyperlipidemia Father         Hyperlipidemia    Diabetes Type 2  Father         Diabetes type II    Other - See Comments Sister         Gluten intolerance    Family History Negative Brother         Good Health       Social history:  Social History     Socioeconomic History    Marital status: Single     Spouse name: Not on file    Number of children: Not on file    Years of education: Not on file    Highest " education level: Not on file   Occupational History    Not on file   Tobacco Use    Smoking status: Never     Passive exposure: Never    Smokeless tobacco: Never   Vaping Use    Vaping Use: Never used   Substance and Sexual Activity    Alcohol use: Yes     Comment:  rare    Drug use: No    Sexual activity: Not Currently     Partners: Male     Comment: Birth control method: would like to become sexually active soon with boyfriend   Other Topics Concern    Not on file   Social History Narrative     2010, final in 2011.  Two children.    Living with her parents in [place].    Employed with [employer].    Starting LPN training fall 2013, completed.    In relationship - possibly thinking about marriage in 2016.     Social Determinants of Health     Financial Resource Strain: High Risk (2/9/2024)    Financial Resource Strain     Within the past 12 months, have you or your family members you live with been unable to get utilities (heat, electricity) when it was really needed?: Yes   Food Insecurity: Low Risk  (2/9/2024)    Food Insecurity     Within the past 12 months, did you worry that your food would run out before you got money to buy more?: No     Within the past 12 months, did the food you bought just not last and you didn t have money to get more?: No   Transportation Needs: Low Risk  (2/9/2024)    Transportation Needs     Within the past 12 months, has lack of transportation kept you from medical appointments, getting your medicines, non-medical meetings or appointments, work, or from getting things that you need?: No   Physical Activity: Not on file   Stress: Not on file   Social Connections: Not on file   Interpersonal Safety: Low Risk  (2/9/2024)    Interpersonal Safety     Do you feel physically and emotionally safe where you currently live?: Yes     Within the past 12 months, have you been hit, slapped, kicked or otherwise physically hurt by someone?: No     Within the past 12 months, have you  "been humiliated or emotionally abused in other ways by your partner or ex-partner?: No   Housing Stability: Low Risk  (2/9/2024)    Housing Stability     Do you have housing? : Yes     Are you worried about losing your housing?: No       PROBLEM LIST:  Patient Active Problem List   Diagnosis    Menstrual irregularity    Migraine headache    Right ankle instability    Bilateral sensorineural hearing loss       Review of Systems:  COMPLETE 12 point REVIEW OF SYSTEMS is otherwise negative with the exception of which is stated above.    Physical exam: /72 (BP Location: Right arm, Patient Position: Sitting, Cuff Size: Adult Regular)   Pulse 81   Resp 18   Ht 1.651 m (5' 5\")   Wt 72.3 kg (159 lb 6.4 oz)   SpO2 98%   BMI 26.53 kg/m      General: this is a pleasant female patient in no acute distress.  Patient is awake alert and oriented x3 .   EXAM:  Chest/Respiratory Exam: Normal - Clear to auscultation without rales, rhonchi, or wheezing.  Cardiovascular Exam: normal  Musculoskeletal: Right shoulder examination shows forward elevation to 90 degrees.  Abduction to 90 degrees.  Internal rotation to hip level.  External rotation of 5 degrees.  Pain with impingement testing.  Pain with Yuba testing.  Neuro examination intact.    Imaging: MRI right shoulder shows rotator cuff tendinitis impingement type II acromion AC arthrosis and labral tearing.    Assessment:   Right shoulder adhesive capsulitis with underlying impingement and AC joint arthritis.    Plan:    At this point given significant lack of function of the shoulder and lack of progress in regards to conservative management my recommendation is to proceed forward with shoulder arthroscopy decompression distal clavicle resection labral debridement.  At that same time we will plan to manipulate that joint given her adhesive capsulitis and certainly could do some things to help with that as well.  Patient is certainly interested in that process will work " on timing and scheduling for her moving forward likely in May timeframe or thereabouts.      Jefferson Mills MD

## 2024-03-14 ENCOUNTER — TELEPHONE (OUTPATIENT)
Dept: ORTHOPEDICS | Facility: OTHER | Age: 49
End: 2024-03-14
Payer: COMMERCIAL

## 2024-03-14 ENCOUNTER — HOSPITAL ENCOUNTER (OUTPATIENT)
Facility: OTHER | Age: 49
End: 2024-03-14
Attending: ORTHOPAEDIC SURGERY | Admitting: ORTHOPAEDIC SURGERY
Payer: COMMERCIAL

## 2024-03-14 NOTE — TELEPHONE ENCOUNTER
Patient has a question regarding returning to work after surgery. She is wondering if she will be able to type while she is in the sling. She is just wondering as far as what she will be able to do and what to tell her boss.    Annemarie Cruz on 3/14/2024 at 4:03 PM

## 2024-03-15 ENCOUNTER — TELEPHONE (OUTPATIENT)
Dept: OBGYN | Facility: CLINIC | Age: 49
End: 2024-03-15
Payer: COMMERCIAL

## 2024-03-15 NOTE — TELEPHONE ENCOUNTER
Called patient to review benign pathology results.  She is overall doing well, but still has some pressure in the pelvis, it is difficult to lie flat.  Denies worsening pain, bleeding or fevers.  Did see a small stitch in the toilet, but no issues since then.  Has post-op visit next week.  Reviewed recovery and when to call.    Maria Raman MD

## 2024-03-15 NOTE — PROGRESS NOTES
Otolaryngology Consultation    Patient: Bibiana Lira  : 1975    Patient presents with:  Hearing Problem: Hearing eval      HPI:  Bibiana Lira is a 48 year old female seen today for hearing loss for the last few years. She has felt her right side hearing has decreased.   She had one aid for the last several years. She has felt some moisture in her left ear.       Denies COM or otologic surgeries.   Denies otalgia, otorrhea  Denies worrisome tinnitus. She does have intermittent tinnitus.   Denies fluctuating hearing loss or tinnitus.   Denies vertigo or facial paraesthesia.   +Noise exposure - loud music at time.   Father has HL.         Audiogram- 2019  Type A tympanograms  Thresholds are left ear normal sloping to mild SNHl and right ear normal hearing till 4000 Hz then sloping to mild loss.   SRT=PTA      Current Outpatient Rx   Medication Sig Dispense Refill    famotidine (PEPCID) 20 MG tablet Take 1 tablet (20 mg) by mouth 2 times daily as needed (heartburn) 60 tablet 3    latanoprost (XALATAN) 0.005 % ophthalmic solution Place 1 drop into both eyes daily      metroNIDAZOLE (METROCREAM) 0.75 % external cream Apply topically 2 times daily 45 g 3    oxyCODONE (ROXICODONE) 5 MG tablet Take 1 tablet (5 mg) by mouth every 6 hours as needed for pain 12 tablet 0    polyethylene glycol-electrolytes (NULYTELY) 420 g solution Take 4,000 mLs by mouth once         Allergies: Food, Gluten meal, Lactase-lactobacillus, Orange oil, Sulfa antibiotics, and Tilactase     Past Medical History:   Diagnosis Date    SAM II (cervical intraepithelial neoplasia II) 2016    LEEP completed    Gastroesophageal reflux disease     Irritable bowel syndrome without diarrhea     avoids caramel color; gluten free    Personal history of other medical treatment (CODE)     , both vaginal deliveries.  Prolapsed uterus with first delivery.    Stomach ulcer 2019       Past Surgical History:   Procedure Laterality Date     "COLONOSCOPY N/A 12/23/2019    F/U 2029 normal    COLONOSCOPY N/A 01/22/2024    F/U 2034 normal robert no colitis    DAVINCI HYSTERECTOMY TOTAL, SALPINGECTOMY BILATERAL Bilateral 3/5/2024    Procedure: HYSTERECTOMY, TOTAL, ROBOT-ASSISTED, WITH BILATERAL SALPINGECTOMY;  Surgeon: Maria Raman MD;  [facility]    DAVINCI SACROCOLPOPEXY, CYSTOSCOPY, COMBINED N/A 3/5/2024    Procedure: SACROCOLPOPEXY, ROBOT-ASSISTED, LAPAROSCOPIC, WITH CYSTOSCOPY;  Surgeon: Ebenezer Kennedy MD;  [facility]    ENDOSCOPY  12/23/2019    Pyloric ulcer    ESOPHAGOSCOPY, GASTROSCOPY, DUODENOSCOPY (EGD), COMBINED N/A 12/23/2019    Procedure: ESOPHAGOGASTRODUODENOSCOPY, WITH BIOPSY;  Surgeon: Oli Jackson MD;  Location: [facility] OR    EXTRACTION(S) DENTAL      [place] teeth extraction    LEEP TX, CERVICAL  2016    OTHER SURGICAL HISTORY      10/26/2017,[id number],ANKLE SURGERY,ligamentous repair; Dr. Ren       ENT family history reviewed    Social History     Tobacco Use    Smoking status: Never     Passive exposure: Never    Smokeless tobacco: Never   Vaping Use    Vaping Use: Never used   Substance Use Topics    Alcohol use: Yes     Comment:  rare    Drug use: No       Review of Systems  ROS: 10 point ROS neg other than the symptoms noted above in the HPI     Physical Exam  /52 (BP Location: Right arm, Patient Position: Sitting, Cuff Size: Adult Regular)   Pulse 90   Temp 98.6  F (37  C) (Temporal)   Resp 18   Ht 1.651 m (5' 5\")   Wt 72.3 kg (159 lb 6.3 oz)   SpO2 98%   BMI 26.52 kg/m      General - The patient is well nourished and well developed, and appears to have good nutritional status.  Alert and oriented to person and place, answers questions and cooperates with examination appropriately.   Head and Face - Normocephalic and atraumatic, with no gross asymmetry noted.  The facial nerve is intact, with strong symmetric movements.  Voice and Breathing - The patient was breathing comfortably without the use of accessory " muscles. There was no wheezing, stridor, or stertor.  The patients voice was clear and strong, and had appropriate pitch and quality.  Ears -ears exam with otoscope and under otologic microscopy.  The external auditory canals are patent, the tympanic membranes are intact without effusion, retraction or mass.  Bony landmarks are intact.  Eyes - Extraocular movements intact, and the pupils were reactive to light.  Sclera were not icteric or injected, conjunctiva were pink and moist.  Mouth - Examination of the oral cavity showed pink, healthy oral mucosa. No lesions or ulcerations noted.  The tongue was mobile and midline, and the dentition were in good condition.    Throat - The walls of the oropharynx were smooth, pink, moist, symmetric, and had no lesions or ulcerations.  The tonsillar pillars and soft palate were symmetric.  The uvula was midline on elevation.    Neck - Normal midline excursion of the laryngotracheal complex during swallowing.  Full range of motion on passive movement.  Palpation of the occipital, submental, submandibular, internal jugular chain, and supraclavicular nodes did not demonstrate any abnormal lymph nodes or masses.  Palpation of the thyroid was soft and smooth, with no nodules or goiter appreciated.  The trachea was mobile and midline.  Nose - External contour is symmetric, no gross deflection or scars.  Nasal mucosa is pink and moist with no abnormal mucus.       Impression and Plan- Bibiana Lira is a 48 year old female with:    ICD-10-CM    1. Sensorineural hearing loss (SNHL) of both ears  H90.3     mild asymemtrical      2. Decreased hearing of both ears  H91.93       3. Tinnitus, bilateral  H93.13                 Reassured normal ear exam.    Complete new audiogram. May complete HA consult   She does have mild asymmetrical sensorineural hearing loss in her prior audiogram from 2019.  Recommended MRI of the IAC.    Hearing protection.      The possible etiologies for this were  discussed. They include medication, infection, trauma or neoplasm.  Infrequently a sensorineural hearing loss will occur during or soon after a viral upper respiratory infection. I  discussed the indication for MRI of the Brain and Internal Auditory Canals.  The possibility of acoustic neuroma causing asymmetrical nerve hearing loss was discussed.  The patient was told acoustic neuromas are very rare, benign, and generally treated by observation only.            Dominique Anguiano PA-C  ENT  [facility]

## 2024-03-18 ENCOUNTER — DOCUMENTATION ONLY (OUTPATIENT)
Dept: OTHER | Facility: CLINIC | Age: 49
End: 2024-03-18
Payer: COMMERCIAL

## 2024-03-19 ENCOUNTER — OFFICE VISIT (OUTPATIENT)
Dept: OTOLARYNGOLOGY | Facility: OTHER | Age: 49
End: 2024-03-19
Attending: PHYSICIAN ASSISTANT
Payer: COMMERCIAL

## 2024-03-19 ENCOUNTER — OFFICE VISIT (OUTPATIENT)
Dept: AUDIOLOGY | Facility: OTHER | Age: 49
End: 2024-03-19
Attending: AUDIOLOGIST
Payer: COMMERCIAL

## 2024-03-19 VITALS
BODY MASS INDEX: 26.56 KG/M2 | DIASTOLIC BLOOD PRESSURE: 52 MMHG | HEART RATE: 90 BPM | TEMPERATURE: 98.6 F | RESPIRATION RATE: 18 BRPM | SYSTOLIC BLOOD PRESSURE: 100 MMHG | OXYGEN SATURATION: 98 % | HEIGHT: 65 IN | WEIGHT: 159.39 LBS

## 2024-03-19 DIAGNOSIS — H90.3 SENSORINEURAL HEARING LOSS (SNHL) OF BOTH EARS: Primary | ICD-10-CM

## 2024-03-19 DIAGNOSIS — H93.13 TINNITUS, BILATERAL: ICD-10-CM

## 2024-03-19 DIAGNOSIS — H91.93 DECREASED HEARING OF BOTH EARS: ICD-10-CM

## 2024-03-19 PROCEDURE — 92550 TYMPANOMETRY & REFLEX THRESH: CPT | Performed by: AUDIOLOGIST

## 2024-03-19 PROCEDURE — 99203 OFFICE O/P NEW LOW 30 MIN: CPT | Mod: 25 | Performed by: PHYSICIAN ASSISTANT

## 2024-03-19 PROCEDURE — 92504 EAR MICROSCOPY EXAMINATION: CPT | Performed by: PHYSICIAN ASSISTANT

## 2024-03-19 PROCEDURE — 92557 COMPREHENSIVE HEARING TEST: CPT | Performed by: AUDIOLOGIST

## 2024-03-19 ASSESSMENT — PAIN SCALES - GENERAL: PAINLEVEL: NO PAIN (0)

## 2024-03-19 NOTE — PATIENT INSTRUCTIONS
Ears look well. No fluid or infection.   Complete audiogram  Complete MRI of Inner ear.  Release of records from [facility].  Hearing protection.       Thank you for allowing Dominique Anguiano PA-C and our ENT team to participate in your care.  If your medications are too expensive, please give the nurse a call.  We can possibly change this medication.  If you have a scheduling or an appointment question please contact our Health Unit Coordinator at [phone], Ext. [phone].    ALL nursing questions or concerns can be directed to your ENT nurse at: [phone] Kayleen       VINEGAR AND DISTILLED WATER IRRIGATION FOR EARS    This solution should only be used if the ears have noted drainage, or debris.    Using a sterile cup, mix 1/2 cup of white vinegar with 1/2 cup distilled water.  Irrigate the ear(s) using 15mL of solution every other day.  Completely dry the ear after irrigation, using a blow dryer on a low heat setting.       **If you are using ear drops, use the drops in the morning and the irrigation solution in the evening.

## 2024-03-19 NOTE — PROGRESS NOTES
Audiology Evaluation Completed. Please refer SCANNED AUDIOGRAM and/or TYMPANOGRAM for BACKGROUND, RESULTS, RECOMMENDATIONS.      Faina RIBEIRO, [facility]  Audiologist #[id number]

## 2024-03-19 NOTE — LETTER
3/19/2024         RE: Bibiana Lira  [address] [address] [address]        Dear Colleague,    Thank you for referring your patient, Bibiana Lira, to the [facility]. Please see a copy of my visit note below.    Otolaryngology Consultation    Patient: Bibiana Lira  : 1975    Patient presents with:  Hearing Problem: Hearing eval      HPI:  Bibiana Lira is a 48 year old female seen today for hearing loss for the last few years. She has felt her right side hearing has decreased.   She had one aid for the last several years. She has felt some moisture in her left ear.       Denies COM or otologic surgeries.   Denies otalgia, otorrhea  Denies worrisome tinnitus. She does have intermittent tinnitus.   Denies fluctuating hearing loss or tinnitus.   Denies vertigo or facial paraesthesia.   +Noise exposure - loud music at time.   Father has HL.         Audiogram- 2019  Type A tympanograms  Thresholds are left ear normal sloping to mild SNHl and right ear normal hearing till 4000 Hz then sloping to mild loss.   SRT=PTA      Current Outpatient Rx   Medication Sig Dispense Refill     famotidine (PEPCID) 20 MG tablet Take 1 tablet (20 mg) by mouth 2 times daily as needed (heartburn) 60 tablet 3     latanoprost (XALATAN) 0.005 % ophthalmic solution Place 1 drop into both eyes daily       metroNIDAZOLE (METROCREAM) 0.75 % external cream Apply topically 2 times daily 45 g 3     oxyCODONE (ROXICODONE) 5 MG tablet Take 1 tablet (5 mg) by mouth every 6 hours as needed for pain 12 tablet 0     polyethylene glycol-electrolytes (NULYTELY) 420 g solution Take 4,000 mLs by mouth once         Allergies: Food, Gluten meal, Lactase-lactobacillus, Orange oil, Sulfa antibiotics, and Tilactase     Past Medical History:   Diagnosis Date     SAM II (cervical intraepithelial neoplasia II) 2016    LEEP completed     Gastroesophageal reflux disease      Irritable bowel syndrome  "without diarrhea     avoids caramel color; gluten free     Personal history of other medical treatment (CODE)     , both vaginal deliveries.  Prolapsed uterus with first delivery.     Stomach ulcer 2019       Past Surgical History:   Procedure Laterality Date     COLONOSCOPY N/A 2019    F/U  normal     COLONOSCOPY N/A 2024    F/U  normal robert no colitis     DAVINCI HYSTERECTOMY TOTAL, SALPINGECTOMY BILATERAL Bilateral 3/5/2024    Procedure: HYSTERECTOMY, TOTAL, ROBOT-ASSISTED, WITH BILATERAL SALPINGECTOMY;  Surgeon: Maria Raman MD;  [facility]     DAVINCI SACROCOLPOPEXY, CYSTOSCOPY, COMBINED N/A 3/5/2024    Procedure: SACROCOLPOPEXY, ROBOT-ASSISTED, LAPAROSCOPIC, WITH CYSTOSCOPY;  Surgeon: Ebenezer Kennedy MD;  [facility]     ENDOSCOPY  2019    Pyloric ulcer     ESOPHAGOSCOPY, GASTROSCOPY, DUODENOSCOPY (EGD), COMBINED N/A 2019    Procedure: ESOPHAGOGASTRODUODENOSCOPY, WITH BIOPSY;  Surgeon: Oli Jackson MD;  Location: [facility] OR     EXTRACTION(S) DENTAL      [place] teeth extraction     LEEP TX, CERVICAL  2016     OTHER SURGICAL HISTORY      10/26/2017,[id number],ANKLE SURGERY,ligamentous repair; Dr. Ren       ENT family history reviewed    Social History     Tobacco Use     Smoking status: Never     Passive exposure: Never     Smokeless tobacco: Never   Vaping Use     Vaping Use: Never used   Substance Use Topics     Alcohol use: Yes     Comment:  rare     Drug use: No       Review of Systems  ROS: 10 point ROS neg other than the symptoms noted above in the HPI     Physical Exam  /52 (BP Location: Right arm, Patient Position: Sitting, Cuff Size: Adult Regular)   Pulse 90   Temp 98.6  F (37  C) (Temporal)   Resp 18   Ht 1.651 m (5' 5\")   Wt 72.3 kg (159 lb 6.3 oz)   SpO2 98%   BMI 26.52 kg/m      General - The patient is well nourished and well developed, and appears to have good nutritional status.  Alert and oriented to person and place, answers " questions and cooperates with examination appropriately.   Head and Face - Normocephalic and atraumatic, with no gross asymmetry noted.  The facial nerve is intact, with strong symmetric movements.  Voice and Breathing - The patient was breathing comfortably without the use of accessory muscles. There was no wheezing, stridor, or stertor.  The patients voice was clear and strong, and had appropriate pitch and quality.  Ears -ears exam with otoscope and under otologic microscopy.  The external auditory canals are patent, the tympanic membranes are intact without effusion, retraction or mass.  Bony landmarks are intact.  Eyes - Extraocular movements intact, and the pupils were reactive to light.  Sclera were not icteric or injected, conjunctiva were pink and moist.  Mouth - Examination of the oral cavity showed pink, healthy oral mucosa. No lesions or ulcerations noted.  The tongue was mobile and midline, and the dentition were in good condition.    Throat - The walls of the oropharynx were smooth, pink, moist, symmetric, and had no lesions or ulcerations.  The tonsillar pillars and soft palate were symmetric.  The uvula was midline on elevation.    Neck - Normal midline excursion of the laryngotracheal complex during swallowing.  Full range of motion on passive movement.  Palpation of the occipital, submental, submandibular, internal jugular chain, and supraclavicular nodes did not demonstrate any abnormal lymph nodes or masses.  Palpation of the thyroid was soft and smooth, with no nodules or goiter appreciated.  The trachea was mobile and midline.  Nose - External contour is symmetric, no gross deflection or scars.  Nasal mucosa is pink and moist with no abnormal mucus.       Impression and Plan- Bibiana Lira is a 48 year old female with:    ICD-10-CM    1. Sensorineural hearing loss (SNHL) of both ears  H90.3     mild asymemtrical      2. Decreased hearing of both ears  H91.93       3. Tinnitus, bilateral   H93.13                 Reassured normal ear exam.    Complete new audiogram. May complete HA consult   She does have mild asymmetrical sensorineural hearing loss in her prior audiogram from 2019.  Recommended MRI of the IAC.    Hearing protection.      The possible etiologies for this were discussed. They include medication, infection, trauma or neoplasm.  Infrequently a sensorineural hearing loss will occur during or soon after a viral upper respiratory infection. I  discussed the indication for MRI of the Brain and Internal Auditory Canals.  The possibility of acoustic neuroma causing asymmetrical nerve hearing loss was discussed.  The patient was told acoustic neuromas are very rare, benign, and generally treated by observation only.            Dominique Anguiano PA-C  ENT  [facility], [place]      Again, thank you for allowing me to participate in the care of your patient.        Sincerely,        Dominique Anguiano PA-C

## 2024-03-21 ENCOUNTER — OFFICE VISIT (OUTPATIENT)
Dept: UROLOGY | Facility: CLINIC | Age: 49
End: 2024-03-21
Payer: COMMERCIAL

## 2024-03-21 ENCOUNTER — OFFICE VISIT (OUTPATIENT)
Dept: OBGYN | Facility: CLINIC | Age: 49
End: 2024-03-21
Attending: OBSTETRICS & GYNECOLOGY
Payer: COMMERCIAL

## 2024-03-21 VITALS — OXYGEN SATURATION: 97 % | SYSTOLIC BLOOD PRESSURE: 108 MMHG | DIASTOLIC BLOOD PRESSURE: 52 MMHG | HEART RATE: 77 BPM

## 2024-03-21 VITALS
WEIGHT: 158 LBS | BODY MASS INDEX: 26.29 KG/M2 | SYSTOLIC BLOOD PRESSURE: 113 MMHG | HEART RATE: 76 BPM | DIASTOLIC BLOOD PRESSURE: 80 MMHG

## 2024-03-21 DIAGNOSIS — Z48.89 ENCOUNTER FOR POSTOPERATIVE CARE: Primary | ICD-10-CM

## 2024-03-21 DIAGNOSIS — Z90.710 STATUS POST LAPAROSCOPIC HYSTERECTOMY: Primary | ICD-10-CM

## 2024-03-21 DIAGNOSIS — K59.00 CONSTIPATION, UNSPECIFIED CONSTIPATION TYPE: ICD-10-CM

## 2024-03-21 LAB — RESIDUAL VOLUME (RV) (EXTERNAL): 4

## 2024-03-21 PROCEDURE — 99207 PR MEASURE POST-VOID RESIDUAL URINE/BLADDER CAPACITY, US NON-IMAGING: CPT | Performed by: PHYSICIAN ASSISTANT

## 2024-03-21 PROCEDURE — 99024 POSTOP FOLLOW-UP VISIT: CPT | Performed by: PHYSICIAN ASSISTANT

## 2024-03-21 PROCEDURE — 99024 POSTOP FOLLOW-UP VISIT: CPT | Performed by: OBSTETRICS & GYNECOLOGY

## 2024-03-21 PROCEDURE — 99213 OFFICE O/P EST LOW 20 MIN: CPT | Performed by: OBSTETRICS & GYNECOLOGY

## 2024-03-21 RX ORDER — ESTRADIOL 0.1 MG/G
2 CREAM VAGINAL
Qty: 40 G | Refills: 3 | Status: SHIPPED | OUTPATIENT
Start: 2024-03-21

## 2024-03-21 ASSESSMENT — PAIN SCALES - GENERAL: PAINLEVEL: MODERATE PAIN (4)

## 2024-03-21 NOTE — PATIENT INSTRUCTIONS
Continue post-op restrictions for 4 more weeks.    Keep follow-up appointment as planned with Dr. Kennedy.     Try to add miralax every other day or daily to help with bowel movements.     Start estrogen cream 3/week at night. Apply blueberry sized amount on finger and rub along vaginal tissue like a face cream. Call clinic if medication is too expensive. You can also try using a Good Rx card to see if cheaper than insurance coverage.

## 2024-03-21 NOTE — LETTER
3/21/2024       RE: Bibiana Lira  [address] [address] [address]     Dear Colleague,    Thank you for referring your patient, Bibiana Lira, to the [facility] [place] at [facility]. Please see a copy of my visit note below.    March 21, 2024    Post operative visit    Patient returns today for follow up s/p total robotic hysterectomy, bilateral salpingectomy with Dr. Raman and sacrocolpopexy with cysto by Dr. Kennedy on 3/5/24. Here today for post-op follow-up with her sister. States she is doing well. Has mild lower abdominal cramping at times. Has some constipation. Using stool softeners, as well as senna or miralax prn. States the bowel regimen can make her feel bloated. Has slight throbbing of vulva when sitting at work, but not overly bothersome. Denies vaginal pain, bleeding, s/s of UTI, trouble with voiding or any other voiding issues. Patient voices no other concerns at this time.     There were no vitals taken for this visit.  Patient is comfortable, in no distress, non-labored breathing.  Abdomen is soft, non-tender, non-distended, and no rebound tenderness. Incision sites along abdomen well approximated and healing appropriately with surgical glue. Slight ecchymosis around the incision sites along abdomen. Abdominal incision sites without obvious bleeding, drainage, erythema, rashes, lesions, ulcers, pain to palpation. Vulva unremarkable. Speculum exam obtained. No obvious abnormal discharge, bleeding, lesions, ulcers, pain to palpation, mesh extrusion, sutures present. Note moderate amount of stool in colon/rectum. No communication between vagina and colon/rectum.    PVR 4 mL by bladder scan    Pathology   Final Diagnosis   Uterus and fallopian tubes, hysterectomy with bilateral salpingectomy:  - Inactive endometrium  - Cervix with nabothian cysts  - Myometrium and fallopian tubes with no significant histologic  abnormality         A/P: Patient is s/p total robotic hysterectomy, bilateral salpingectomy with Dr. Raman and sacrocolpopexy with cysto by Dr. Kennedy on 3/5/24.     - PVR  - Continue post-op restrictions x4 more weeks  - Keep follow-up appointment as planned with Dr. Kennedy.   - encourage increasing miralax to help with BMs.   - Start vaginal estrogen cream 3x/week. Discussed risks/benefits of medication and potential side effects of medication.       Annie Merchant PA-C  Urology    CC  Patient Care Team:  Margoth Ramachandran DO as PCP - General (Family Practice)  Maggie Mariee PA-C as Assigned PCP  Annie Merchant PA-C as Physician Assistant  Ebenezer Kennedy MD as Assigned Surgical Provider  Maria Raman MD as MD (OB/Gyn)  Maria Raman MD as Assigned OBGYN Provider  Cherie Doe PA-C as Physician Assistant (Family Medicine)  Jefferson Mills MD as MD (Orthopaedic Surgery)

## 2024-03-21 NOTE — NURSING NOTE
PVR by scanner= 4mL    Pt states she is doing ok post surgery.  Pt denies seeing any blood.    BRITTNI Umanzor CMA

## 2024-03-21 NOTE — LETTER
3/21/2024       RE: Bibiana Lira  [address] [address] [address]     Dear Colleague,    Thank you for referring your patient, Bibiana Lira, to the [facility] [place] at [facility]. Please see a copy of my visit note below.    Bibiana Lira is 48 year old yo s/p Davinci assisted total laparoscopic hysterectomy, bilateral salpingectomy and sacral colpopexy on 3/5/24 for pelvic prolapse.  She has been doing well. She returned to work yesterday and overall did well, except a little soreness in the left groin from sitting more.  She is not taking pain medication and not having any vaginal bleeding. Bladder and bowel function have returned to normal, although she continues to take a stool softener.     ROS: 10 point ROS neg other than the symptoms noted above in the HPI.  Past Medical History:   Diagnosis Date    SAM II (cervical intraepithelial neoplasia II)     LEEP completed    Gastroesophageal reflux disease     Irritable bowel syndrome without diarrhea     avoids caramel color; gluten free    Personal history of other medical treatment (CODE)     , both vaginal deliveries.  Prolapsed uterus with first delivery.    Stomach ulcer 2019       Past Surgical History:   Procedure Laterality Date    COLONOSCOPY N/A 2019    F/U  normal    COLONOSCOPY N/A 2024    F/U  normal robert no colitis    DAVINCI HYSTERECTOMY TOTAL, SALPINGECTOMY BILATERAL Bilateral 3/5/2024    Procedure: HYSTERECTOMY, TOTAL, ROBOT-ASSISTED, WITH BILATERAL SALPINGECTOMY;  Surgeon: Maria Raman MD;  [facility]    DAVINCI SACROCOLPOPEXY, CYSTOSCOPY, COMBINED N/A 3/5/2024    Procedure: SACROCOLPOPEXY, ROBOT-ASSISTED, LAPAROSCOPIC, WITH CYSTOSCOPY;  Surgeon: Ebenezer Kennedy MD;  [facility]    ENDOSCOPY  2019    Pyloric ulcer    ESOPHAGOSCOPY, GASTROSCOPY, DUODENOSCOPY (EGD), COMBINED N/A 2019     Procedure: ESOPHAGOGASTRODUODENOSCOPY, WITH BIOPSY;  Surgeon: Oli Jackson MD;  Location: [facility] OR    EXTRACTION(S) DENTAL      [place] teeth extraction    LEEP TX, CERVICAL  2016    OTHER SURGICAL HISTORY      10/26/2017,[id number],ANKLE SURGERY,ligamentous repair; Dr. Ren       /80   Pulse 76   Wt 71.7 kg (158 lb)   LMP 02/27/2024   BMI 26.29 kg/m     Gen'l: well appearing  Abdomen: soft, NT, ND well healed laparoscopic incisions, glue is still present  Pelvic exam deferred to next post-op visit    Pathology:  Uterus and fallopian tubes, hysterectomy with bilateral salpingectomy:  - Inactive endometrium  - Cervix with nabothian cysts  - Myometrium and fallopian tubes with no significant histologic abnormality    Assessment/Plan:  2 weeks s/p Davinci assisted total laparoscopic hysterectomy, bilateral salpingectomy and sacral colpopexy doing well.    - Reviewed benign pathology, no cervical dysplasia.  Patient had a LEEP in 2016 so will need pap smear follow-up until 2041.  - Plan for pelvic exam to assess cuff at 6 weeks visit, no concerning symptoms  - Continue pelvic rest and lifting restrictions for 8 wks.  - Has final post-op scheduled.  Maria Raman MD

## 2024-03-21 NOTE — PROGRESS NOTES
Bibiana Lira is 48 year old yo s/p Davinci assisted total laparoscopic hysterectomy, bilateral salpingectomy and sacral colpopexy on 3/5/24 for pelvic prolapse.  She has been doing well. She returned to work yesterday and overall did well, except a little soreness in the left groin from sitting more.  She is not taking pain medication and not having any vaginal bleeding. Bladder and bowel function have returned to normal, although she continues to take a stool softener.     ROS: 10 point ROS neg other than the symptoms noted above in the HPI.  Past Medical History:   Diagnosis Date    SAM II (cervical intraepithelial neoplasia II) 2016    LEEP completed    Gastroesophageal reflux disease     Irritable bowel syndrome without diarrhea     avoids caramel color; gluten free    Personal history of other medical treatment (CODE)     , both vaginal deliveries.  Prolapsed uterus with first delivery.    Stomach ulcer 2019       Past Surgical History:   Procedure Laterality Date    COLONOSCOPY N/A 2019    F/U  normal    COLONOSCOPY N/A 2024    F/U  normal robert no colitis    DAVINCI HYSTERECTOMY TOTAL, SALPINGECTOMY BILATERAL Bilateral 3/5/2024    Procedure: HYSTERECTOMY, TOTAL, ROBOT-ASSISTED, WITH BILATERAL SALPINGECTOMY;  Surgeon: Maria Raman MD;  [facility]    DAVINCI SACROCOLPOPEXY, CYSTOSCOPY, COMBINED N/A 3/5/2024    Procedure: SACROCOLPOPEXY, ROBOT-ASSISTED, LAPAROSCOPIC, WITH CYSTOSCOPY;  Surgeon: Ebenezer Kennedy MD;  [facility]    ENDOSCOPY  2019    Pyloric ulcer    ESOPHAGOSCOPY, GASTROSCOPY, DUODENOSCOPY (EGD), COMBINED N/A 2019    Procedure: ESOPHAGOGASTRODUODENOSCOPY, WITH BIOPSY;  Surgeon: Oli Jackson MD;  Location: [facility] OR    EXTRACTION(S) DENTAL      [place] teeth extraction    LEEP TX, CERVICAL  2016    OTHER SURGICAL HISTORY      10/26/2017,[id number],ANKLE SURGERY,ligamentous repair; Dr. Ren       /80   Pulse 76   Wt 71.7 kg (158 lb)    LMP 02/27/2024   BMI 26.29 kg/m     Gen'l: well appearing  Abdomen: soft, NT, ND well healed laparoscopic incisions, glue is still present  Pelvic exam deferred to next post-op visit    Pathology:  Uterus and fallopian tubes, hysterectomy with bilateral salpingectomy:  - Inactive endometrium  - Cervix with nabothian cysts  - Myometrium and fallopian tubes with no significant histologic abnormality    Assessment/Plan:  2 weeks s/p Davinci assisted total laparoscopic hysterectomy, bilateral salpingectomy and sacral colpopexy doing well.    - Reviewed benign pathology, no cervical dysplasia.  Patient had a LEEP in 2016 so will need pap smear follow-up until 2041.  - Plan for pelvic exam to assess cuff at 6 weeks visit, no concerning symptoms  - Continue pelvic rest and lifting restrictions for 8 wks.  - Has final post-op scheduled.  Maria Raman MD

## 2024-03-21 NOTE — PROGRESS NOTES
March 21, 2024    Post operative visit    Patient returns today for follow up s/p total robotic hysterectomy, bilateral salpingectomy with Dr. Raman and sacrocolpopexy with cysto by Dr. Kennedy on 3/5/24. Here today for post-op follow-up with her sister. States she is doing well. Has mild lower abdominal cramping at times. Has some constipation. Using stool softeners, as well as senna or miralax prn. States the bowel regimen can make her feel bloated. Has slight throbbing of vulva when sitting at work, but not overly bothersome. Denies vaginal pain, bleeding, s/s of UTI, trouble with voiding or any other voiding issues. Patient voices no other concerns at this time.     There were no vitals taken for this visit.  Patient is comfortable, in no distress, non-labored breathing.  Abdomen is soft, non-tender, non-distended, and no rebound tenderness. Incision sites along abdomen well approximated and healing appropriately with surgical glue. Slight ecchymosis around the incision sites along abdomen. Abdominal incision sites without obvious bleeding, drainage, erythema, rashes, lesions, ulcers, pain to palpation. Vulva unremarkable. Speculum exam obtained. No obvious abnormal discharge, bleeding, lesions, ulcers, pain to palpation, mesh extrusion, sutures present. Note moderate amount of stool in colon/rectum. No communication between vagina and colon/rectum.    PVR 4 mL by bladder scan    Pathology   Final Diagnosis   Uterus and fallopian tubes, hysterectomy with bilateral salpingectomy:  - Inactive endometrium  - Cervix with nabothian cysts  - Myometrium and fallopian tubes with no significant histologic abnormality         A/P: Patient is s/p total robotic hysterectomy, bilateral salpingectomy with Dr. Raman and sacrocolpopexy with cysto by Dr. Kennedy on 3/5/24.     - PVR  - Continue post-op restrictions x4 more weeks  - Keep follow-up appointment as planned with Dr. Kennedy.   - encourage increasing miralax to help with  BMs.   - Start vaginal estrogen cream 3x/week. Discussed risks/benefits of medication and potential side effects of medication.       Annie Merchant PA-C  Urology    CC  Patient Care Team:  Margoth Ramachandran DO as PCP - General (Family Practice)  Maggie Mariee PA-C as Assigned PCP  Annie Merchant PA-C as Physician Assistant  Ebenezer Kennedy MD as Assigned Surgical Provider  Maria Raman MD as MD (OB/Gyn)  Maria Raman MD as Assigned OBGYN Provider  Cherie Doe PA-C as Physician Assistant (Family Medicine)  Jefferson Mills MD as MD (Orthopaedic Surgery)

## 2024-04-15 ENCOUNTER — VIRTUAL VISIT (OUTPATIENT)
Dept: UROLOGY | Facility: CLINIC | Age: 49
End: 2024-04-15
Payer: COMMERCIAL

## 2024-04-15 ENCOUNTER — TELEPHONE (OUTPATIENT)
Dept: UROLOGY | Facility: CLINIC | Age: 49
End: 2024-04-15

## 2024-04-15 DIAGNOSIS — M62.89 PELVIC FLOOR DYSFUNCTION: ICD-10-CM

## 2024-04-15 DIAGNOSIS — M62.89 PELVIC FLOOR DYSFUNCTION: Primary | ICD-10-CM

## 2024-04-15 DIAGNOSIS — N95.8 GENITOURINARY SYNDROME OF MENOPAUSE: ICD-10-CM

## 2024-04-15 DIAGNOSIS — N81.11 MIDLINE CYSTOCELE: Primary | ICD-10-CM

## 2024-04-15 PROCEDURE — 99024 POSTOP FOLLOW-UP VISIT: CPT | Mod: 95 | Performed by: UROLOGY

## 2024-04-15 ASSESSMENT — PAIN SCALES - GENERAL: PAINLEVEL: NO PAIN (0)

## 2024-04-15 NOTE — TELEPHONE ENCOUNTER
Ebenezer Kennedy MD Berkenes, Melissa, RN  Delta Howard,  Can you please put in a referral to PT for pelvic floor muscle tightness.  She needs pelvic floor relaxation for hypertonicity. She lives in [facility].    Received the above message from Dr. Kennedy. Order placed per Dr. Kennedy with the above information.     Called and spoke to patient who reports that the [facility] is now affiliated with [facility] and her insurance requires her to stay within [facility]. Informed patient that we will work on getting the referral over to [facility]. Patient aware to call to schedule pelvic floor physical therapy.    Spoke with [facility] (telephone # [phone]) and was transferred to physical therapy department. Left message with request to return call with fax number for their location. Once fax number is received, will send referral.    Anita Pierce RN, BSN

## 2024-04-15 NOTE — PROGRESS NOTES
Reason for Visit: 6 week post op for robotic sacrocolpopexy with hysterectomy on 3/5/24    Clinical Data: Ms. Bibiana Lira  is a 48 year old female with a history of the above. She returns today for her post operative visit.  She states that she is doing well.  No stress incontinence, no urinary urgency or urge inontinence.   Her incisions healing well.      A/P s/p Robotic sacrocolpopexy doing well  -continue the estrogen cream  -discontinue with restrictions  -referral to PFPT for pelvic floor tightness in [place]  -f/u f/u    Thank you for allowing me to participate in the care of  Ms.Amanda LADY Lira and I will keep you updated on her progress.    Ebenezer Kennedy MD

## 2024-04-15 NOTE — NURSING NOTE
Pt , no high pain    Is the patient currently in the state of [place]? YES    Visit mode:VIDEO    If the visit is dropped, the patient can be reconnected by: VIDEO VISIT: Text to cell phone:   Telephone Information:   Mobile [phone]       Will anyone else be joining the visit? NO  (If patient encounters technical issues they should call [phone] :[phone])    How would you like to obtain your AVS? MyChart    Are changes needed to the allergy or medication list? No    Are refills needed on medications prescribed by this physician? No    Reason for visit: RECHECK (6 weeks post op DOS 3/5)    Neeru CAMPOS

## 2024-04-16 NOTE — TELEPHONE ENCOUNTER
Called and spoke to [facility] and [facility] and fax number is [phone].     PT referral successfully faxed to [facility] at [phone].    Anita Pierce RN, BSN

## 2024-04-18 ENCOUNTER — OFFICE VISIT (OUTPATIENT)
Dept: OBGYN | Facility: CLINIC | Age: 49
End: 2024-04-18
Attending: OBSTETRICS & GYNECOLOGY
Payer: COMMERCIAL

## 2024-04-18 VITALS
DIASTOLIC BLOOD PRESSURE: 58 MMHG | HEIGHT: 65 IN | BODY MASS INDEX: 26.29 KG/M2 | SYSTOLIC BLOOD PRESSURE: 106 MMHG | HEART RATE: 71 BPM

## 2024-04-18 DIAGNOSIS — Z90.710 STATUS POST LAPAROSCOPIC HYSTERECTOMY: Primary | ICD-10-CM

## 2024-04-18 PROCEDURE — 99024 POSTOP FOLLOW-UP VISIT: CPT | Performed by: OBSTETRICS & GYNECOLOGY

## 2024-04-18 PROCEDURE — 99213 OFFICE O/P EST LOW 20 MIN: CPT | Performed by: OBSTETRICS & GYNECOLOGY

## 2024-04-18 ASSESSMENT — PAIN SCALES - GENERAL: PAINLEVEL: MILD PAIN (2)

## 2024-04-18 NOTE — PROGRESS NOTES
Bibiana Lira is 48 year old yo s/p Davinci assisted total laparoscopic hysterectomy and bilateral salpingectomy combined with robotic sacral colpopexy on 3/5/24 for pelvic prolapse.  She has been doing well. Feels she is having more energy now, much better after 4 weeks post op.  She had a couple days of light pink spotting in early April, this has completely resolved.  She denies pain, she no longer requires any pain medication.  Bladder and bowel function have returned to normal.  She is so happy she had the surgery done.  Having some pain with sitting, symptoms thought related to increased pelvic floor tone.  Dr. Kennedy has made a referral for pelvic floor PT.      ROS: 10 point ROS neg other than the symptoms noted above in the HPI.  Past Medical History:   Diagnosis Date    SAM II (cervical intraepithelial neoplasia II)     LEEP completed    Gastroesophageal reflux disease     Irritable bowel syndrome without diarrhea     avoids caramel color; gluten free    Personal history of other medical treatment (CODE)     , both vaginal deliveries.  Prolapsed uterus with first delivery.    Stomach ulcer 2019       Past Surgical History:   Procedure Laterality Date    COLONOSCOPY N/A 2019    F/U  normal    COLONOSCOPY N/A 2024    F/U  normal robert no colitis    DAVINCI HYSTERECTOMY TOTAL, SALPINGECTOMY BILATERAL Bilateral 2024    ROBOT-ASSISTED; Surgeon: Maria Raman MD;  Location: [facility].  Ovaries remain.    DAVINCI SACROCOLPOPEXY, CYSTOSCOPY, COMBINED N/A 2024    Procedure: SACROCOLPOPEXY, ROBOT-ASSISTED, LAPAROSCOPIC, WITH CYSTOSCOPY;  Surgeon: Ebenezer Kennedy MD;  Location: [facility] OR    ENDOSCOPY  2019    Pyloric ulcer    ESOPHAGOSCOPY, GASTROSCOPY, DUODENOSCOPY (EGD), COMBINED N/A 2019    Procedure: ESOPHAGOGASTRODUODENOSCOPY, WITH BIOPSY;  Surgeon: Oli Jackson MD;  Location: [facility]    EXTRACTION(S) DENTAL      [place] teeth extraction    LEEP TX,  "CERVICAL  2016    OTHER SURGICAL HISTORY      10/26/2017,[id number],ANKLE SURGERY,ligamentous repair; Dr. Ren       /58   Pulse 71   Ht 1.651 m (5' 5\")   LMP 02/27/2024   BMI 26.29 kg/m     Gen'l: well appearing  Abdomen: soft, NT, ND well healed laparoscopic stab wounds  Vulva: normal, no lesions  Urethra: normal  Vagina: pink, physiologic discharge present, suture line visible at vaginal cuff, no blood noted, cuff palpates intact.  Cervix/Uterus: absent  Adnexa: no palpable masses    Pathology:  Uterus and fallopian tubes, hysterectomy with bilateral salpingectomy:  - Inactive endometrium  - Cervix with nabothian cysts  - Myometrium and fallopian tubes with no significant histologic abnormality    Assessment/Plan:  6 weeks s/p Davinci assisted total laparoscopic hysterectomy, bilateral salpingectomy and robotic sacral colpopexy doing well.    - Reviewed benign pathology, continue vaginal paps for 25 years after LEEP  - Reviewed that sutures at vaginal cuff are designed to be delayed absorbable and healing well, call if recurrent bleeding episodes  - Continue pelvic rest and lifting restrictions for 8 wks.  - encouraged patient to see PT, reassured patient this is a common issue treated with PT and therapist specialize in this.  OK to request female therapist.  - Return to pcp for annual exam, to our clinic if any concerns.  Maria Raman MD       "

## 2024-04-18 NOTE — PATIENT INSTRUCTIONS
Thank you for trusting us with your care!     If you need to contact us for questions about:  Symptoms, Scheduling & Medical Questions; Non-urgent (2-3 day response) Jamal message, Urgent (needing response today) [phone] (if after 3:30pm next day response)   Prescriptions: Please call your Pharmacy   Billing: [place] [phone] or [facility]:[phone]

## 2024-04-18 NOTE — LETTER
2024       RE: Bibiana Lira  [id number] [address] [address]     Dear Colleague,    Thank you for referring your patient, Bibiana Lira, to the [facility] [place] at [facility]. Please see a copy of my visit note below.    Bibiana Lira is 48 year old yo s/p Davinci assisted total laparoscopic hysterectomy and bilateral salpingectomy combined with robotic sacral colpopexy on 3/5/24 for pelvic prolapse.  She has been doing well. Feels she is having more energy now, much better after 4 weeks post op.  She had a couple days of light pink spotting in early April, this has completely resolved.  She denies pain, she no longer requires any pain medication.  Bladder and bowel function have returned to normal.  She is so happy she had the surgery done.  Having some pain with sitting, symptoms thought related to increased pelvic floor tone.  Dr. Kennedy has made a referral for pelvic floor PT.      ROS: 10 point ROS neg other than the symptoms noted above in the HPI.  Past Medical History:   Diagnosis Date    SAM II (cervical intraepithelial neoplasia II)     LEEP completed    Gastroesophageal reflux disease     Irritable bowel syndrome without diarrhea     avoids caramel color; gluten free    Personal history of other medical treatment (CODE)     , both vaginal deliveries.  Prolapsed uterus with first delivery.    Stomach ulcer 2019       Past Surgical History:   Procedure Laterality Date    COLONOSCOPY N/A 2019    F/U  normal    COLONOSCOPY N/A 2024    F/U  normal robert no colitis    DAVINCI HYSTERECTOMY TOTAL, SALPINGECTOMY BILATERAL Bilateral 2024    ROBOT-ASSISTED; Surgeon: Maria Raman MD;  Location: [facility].  Ovaries remain.    DAVINCI SACROCOLPOPEXY, CYSTOSCOPY, COMBINED N/A 2024    Procedure: SACROCOLPOPEXY, ROBOT-ASSISTED, LAPAROSCOPIC, WITH CYSTOSCOPY;  Surgeon:  "Ebenezer Kennedy MD;  Location: [facility]    ENDOSCOPY  12/23/2019    Pyloric ulcer    ESOPHAGOSCOPY, GASTROSCOPY, DUODENOSCOPY (EGD), COMBINED N/A 12/23/2019    Procedure: ESOPHAGOGASTRODUODENOSCOPY, WITH BIOPSY;  Surgeon: Oli Jackson MD;  Location: [facility]    EXTRACTION(S) DENTAL      [place] teeth extraction    LEEP TX, CERVICAL  2016    OTHER SURGICAL HISTORY      10/26/2017,[id number],ANKLE SURGERY,ligamentous repair; Dr. Ren       /58   Pulse 71   Ht 1.651 m (5' 5\")   LMP 02/27/2024   BMI 26.29 kg/m     Gen'l: well appearing  Abdomen: soft, NT, ND well healed laparoscopic stab wounds  Vulva: normal, no lesions  Urethra: normal  Vagina: pink, physiologic discharge present, suture line visible at vaginal cuff, no blood noted, cuff palpates intact.  Cervix/Uterus: absent  Adnexa: no palpable masses    Pathology:  Uterus and fallopian tubes, hysterectomy with bilateral salpingectomy:  - Inactive endometrium  - Cervix with nabothian cysts  - Myometrium and fallopian tubes with no significant histologic abnormality    Assessment/Plan:  6 weeks s/p Davinci assisted total laparoscopic hysterectomy, bilateral salpingectomy and robotic sacral colpopexy doing well.  - Reviewed benign pathology, continue vaginal paps for 25 years after LEEP  - Reviewed that sutures at vaginal cuff are designed to be delayed absorbable and healing well, call if recurrent bleeding episodes  - Continue pelvic rest and lifting restrictions for 8 wks.  - encouraged patient to see PT, reassured patient this is a common issue treated with PT and therapist specialize in this.  OK to request female therapist.  - Return to pcp for annual exam, to our clinic if any concerns.  Maria Raman MD   "

## 2024-04-19 ENCOUNTER — TELEPHONE (OUTPATIENT)
Dept: ORTHOPEDICS | Facility: OTHER | Age: 49
End: 2024-04-19
Payer: COMMERCIAL

## 2024-04-19 DIAGNOSIS — G89.29 CHRONIC RIGHT SHOULDER PAIN: Primary | ICD-10-CM

## 2024-04-19 DIAGNOSIS — M25.511 CHRONIC RIGHT SHOULDER PAIN: Primary | ICD-10-CM

## 2024-04-19 NOTE — TELEPHONE ENCOUNTER
Patient called and is requesting PT orders for after her shoulder surgery on 5/7/24.    Esperanza Rodriguez on 4/19/2024 at 1:50 PM

## 2024-04-22 DIAGNOSIS — G89.29 CHRONIC RIGHT SHOULDER PAIN: Primary | ICD-10-CM

## 2024-04-22 DIAGNOSIS — M25.511 CHRONIC RIGHT SHOULDER PAIN: Primary | ICD-10-CM

## 2024-04-26 NOTE — PROGRESS NOTES
Preoperative Evaluation  [facility]  [address]  [place] [address]  Phone: [phone]  Fax: [phone]  Primary Provider: Eliz Doe  Pre-op Performing Provider: ELIZ DOE  May 1, 2024       Bibiana is a 48 year old, presenting for the following:  Pre-Op Exam (Right shoulder surgery)        5/1/2024     3:57 PM   Additional Questions   Roomed by Giovana ANAYA CMA     Surgical Information  Surgery/Procedure: Arthroscopy shoulder  Subacromial Decompression, Distal Clavicle Resection,  labral debridement - right  Surgery Location: [facility]  Surgeon: Dr. Jefferson Mills  Surgery Date: 5/7/2024  Time of Surgery: TBD  Where patient plans to recover: At home with family  Fax number for surgical facility: Note does not need to be faxed, will be available electronically in Epic.    Assessment & Plan     The proposed surgical procedure is considered INTERMEDIATE risk.    Problem List Items Addressed This Visit    None  Visit Diagnoses       Preop general physical exam    -  Primary    Relevant Orders    EKG 12-lead complete w/read - Clinics    Chronic right shoulder pain        Right wrist pain        Relevant Orders    XR Wrist Right G/E 3 Views (Completed)    Orthopedic  Referral    WRIST BRACE,EXTEN.CONTROL (Completed)    Heartburn              Right wrist pain:   Completed right wrist xray.  I personally reviewed the xray. I found no fracture appreciated upon initial read of xray.  Final read pending by radiology.    Encouraged to take ibuprofen for relief up to 4 times per day.  Encouraged rest and elevation.  Encouraged to use ice or heat 15 minutes at a time several times per day to decrease pain. Return to clinic in 1-2 weeks as necessary for persistent pain. Return to clinic with any change or worsening of symptoms.   Referred to orthopedics for consult.    Heartburn: Continue famotidine as previously prescribed.  Stable.  No acute concerns at this  time.    Preoperative clearance, chronic right shoulder pain: Completed EKG which was stable.  Final read pending by internal medicine.  Most recent CBC on 4/28/2024 was normal.  No acute concerns at this time prior to surgery.  Patient is approved for surgery.      Risks and Recommendations  The patient has the following additional risks and recommendations for perioperative complications:   - No identified additional risk factors other than previously addressed    Antiplatelet or Anticoagulation Medication Instructions   - Patient is on no antiplatelet or anticoagulation medications.    Additional Medication Instructions  Patient Instructions   Patient should take the following medications the morning of surgery with a small sip of water: hold all meds.  Patient was instructed to hold the following medications the morning of surgery: hold all meds.     Patient was advised to call our office and the surgical services with any change in condition or new symptoms if they were to develop between today and their surgical date.  Especially any cardiopulmonary symptoms or symptoms concerning for an infection.     Discontinue aspirin, aleve, naproxen and ibuprofen 7 days prior to surgery to reduce bleeding risk.  It is fine to take tylenol the week before surgery.  Hold vitamins and herbal remedies for 14 days before surgery.     Preparing for Your Surgery  Getting started  A nurse will call you to review your health history and instructions. They will give you an arrival time based on your scheduled surgery time. Please be ready to share:  Your doctor's clinic name and phone number  Your medical, surgical, and anesthesia history  A list of allergies and sensitivities  A list of medicines, including herbal treatments and over-the-counter drugs  Whether the patient has a legal guardian (ask how to send us the papers in advance)  Please tell us if you're pregnant--or if there's any chance you might be pregnant. Some  surgeries may injure a fetus (unborn baby), so they require a pregnancy test. Surgeries that are safe for a fetus don't always need a test, and you can choose whether to have one.   If you have a child who's having surgery, please ask for a copy of Preparing for Your Child's Surgery.    Preparing for surgery  Within 10 to 30 days of surgery: Have a pre-op exam (sometimes called an H&P, or History and Physical). This can be done at a clinic or pre-operative center.  If you're having a , you may not need this exam. Talk to your care team.  At your pre-op exam, talk to your care team about all medicines you take. If you need to stop any medicines before surgery, ask when to start taking them again.  We do this for your safety. Many medicines can make you bleed too much during surgery. Some change how well surgery (anesthesia) drugs work.  Call your insurance company to let them know you're having surgery. (If you don't have insurance, call [phone].)  Call your clinic if there's any change in your health. This includes signs of a cold or flu (sore throat, runny nose, cough, rash, fever). It also includes a scrape or scratch near the surgery site.  If you have questions on the day of surgery, call your hospital or surgery center.  Eating and drinking guidelines  For your safety: Unless your surgeon tells you otherwise, follow the guidelines below.  Eat and drink as usual until 8 hours before you arrive for surgery. After that, no food or milk.  Drink clear liquids until 2 hours before you arrive. These are liquids you can see through, like water, Gatorade, and Propel Water. They also include plain black coffee and tea (no cream or milk), candy, and breath mints. You can spit out gum when you arrive.  If you drink alcohol: Stop drinking it the night before surgery.  If your care team tells you to take medicine on the morning of surgery, it's okay to take it with a sip of water.  Preventing infection  Shower  or bathe the night before and morning of your surgery. Follow the instructions your clinic gave you. (If no instructions, use regular soap.)  Don't shave or clip hair near your surgery site. We'll remove the hair if needed.  Don't smoke or vape the morning of surgery. You may chew nicotine gum up to 2 hours before surgery. A nicotine patch is okay.  Note: Some surgeries require you to completely quit smoking and nicotine. Check with your surgeon.  Your care team will make every effort to keep you safe from infection. We will:  Clean our hands often with soap and water (or an alcohol-based hand rub).  Clean the skin at your surgery site with a special soap that kills germs.  Give you a special gown to keep you warm. (Cold raises the risk of infection.)  Wear special hair covers, masks, gowns and gloves during surgery.  Give antibiotic medicine, if prescribed. Not all surgeries need antibiotics.  What to bring on the day of surgery  Photo ID and insurance card  Copy of your health care directive, if you have one  Glasses and hearing aids (bring cases)  You can't wear contacts during surgery  Inhaler and eye drops, if you use them (tell us about these when you arrive)  CPAP machine or breathing device, if you use them  A few personal items, if spending the night  If you have . . .  A pacemaker, ICD (cardiac defibrillator) or other implant: Bring the ID card.  An implanted stimulator: Bring the remote control.  A legal guardian: Bring a copy of the certified (court-stamped) guardianship papers.  Please remove any jewelry, including body piercings. Leave jewelry and other valuables at home.  If you're going home the day of surgery  You must have a responsible adult drive you home. They should stay with you overnight as well.  If you don't have someone to stay with you, and you aren't safe to go home alone, we may keep you overnight. Insurance often won't pay for this.  After surgery  If it's hard to control your pain or  you need more pain medicine, please call your surgeon's office.  Questions?   If you have any questions for your care team, list them here: _________________________________________________________________________________________________________________________________________________________________________ ____________________________________ ____________________________________ ____________________________________  For informational purposes only. Not to replace the advice of your health care provider. Copyright   2003, 2019 [facility]. All rights reserved. Clinically reviewed by Belem Iverson MD. Riiid [id number] - REV 12/22.    How to Take Your Medication Before Surgery  - Take all of your medications before surgery except as noted below  - HOLD (do not take) Aspirin for 7 days  - STOP taking all vitamins and herbal supplements 14 days before surgery.     Recommendation  APPROVAL GIVEN to proceed with proposed procedure, without further diagnostic evaluation.    Ordering of each unique test  30 minutes spent by me on the date of the encounter doing chart review, history and exam, documentation and further activities per the note    Subjective       HPI related to upcoming procedure:   Patient struggles with chronic right shoulder pain.  Diagnosed with frozen shoulder syndrome.  Right shoulder has increased stiffness and pain.  MRI scan shows rotator cuff tendinitis and impingement along with AC arthrosis plus labral fraying.  Tried completing therapeutic exercises with lack of gains and functionality.  Significant limitations in her function.  Reaching overhead causes increased difficulty and pain.        4/26/2024    10:18 PM   Preop Questions   1. Have you ever had a heart attack or stroke? No   2. Have you ever had surgery on your heart or blood vessels, such as a stent placement, a coronary artery bypass, or surgery on an artery in your head, neck, heart, or legs? No   3. Do you have chest pain  with activity? No   4. Do you have a history of  heart failure? No   5. Do you currently have a cold, bronchitis or symptoms of other infection? No   6. Do you have a cough, shortness of breath, or wheezing? No   7. Do you or anyone in your family have previous history of blood clots? YES - Father had a blood clot after having covid-19   8. Do you or does anyone in your family have a serious bleeding problem such as prolonged bleeding following surgeries or cuts? No   9. Have you ever had problems with anemia or been told to take iron pills? YES - history of anemia with heavy menses   10. Have you had any abnormal blood loss such as black, tarry or bloody stools, or abnormal vaginal bleeding? No   11. Have you ever had a blood transfusion? No   12. Are you willing to have a blood transfusion if it is medically needed before, during, or after your surgery? Yes   13. Have you or any of your relatives ever had problems with anesthesia? YES - she had increased itching after the last surgery with the anesthesia   14. Do you have sleep apnea, excessive snoring or daytime drowsiness? No   15. Do you have any artifical heart valves or other implanted medical devices like a pacemaker, defibrillator, or continuous glucose monitor? No   16. Do you have artificial joints? No   17. Are you allergic to latex? YES: allergic to adhesive bandaids   18. Is there any chance that you may be pregnant? No     Health Care Directive  Patient has a Health Care Directive on file      Preoperative Review of    reviewed - controlled substances reflected in medication list.      Status of Chronic Conditions:  Heartburn: Patient has history of heartburn.  Currently uses famotidine twice daily as needed for heartburn symptoms.  Tolerates medication well.  No side effects noted.    Patient has had right wrist pain over the last year.  Notices that her right wrist clicks.  Does shoot up and down with pain.  Pain with lifting items along with  flexion and extension.  Would like an orthopedic consult.    Patient has tolerated surgery well in the past.    Patient has no recent cough or cold symptoms.  No recent fevers, chills, sore throat, ear pain, chest pain, palpitations, problems breathing, stomachaches, nausea, vomiting, diarrhea, constipation, blood in stool or urine, dysuria, frequency, urgency.    Patient can walk up a flight of stairs without becoming short of breath or having chest pain: YES   Patient is able to tolerate greater than 4 METs of activity without any cardiopulmonary symptoms.    Patient Active Problem List    Diagnosis Date Noted    Bilateral sensorineural hearing loss 2019     Priority: Medium    Menstrual irregularity 2017     Priority: Medium     Overview:   Breakthrough spotting started Feb-2016: off and on.      Right ankle instability 2017     Priority: Medium     Overview:   S/p fracture in  and severe sprain early .      Migraine headache 2009     Priority: Medium     Overview:   Migraine Headache        Past Medical History:   Diagnosis Date    SAM II (cervical intraepithelial neoplasia II) 2016    LEEP completed    Gastroesophageal reflux disease     Irritable bowel syndrome without diarrhea     avoids caramel color; gluten free    Personal history of other medical treatment (CODE)     , both vaginal deliveries.  Prolapsed uterus with first delivery.    Stomach ulcer 2019     Past Surgical History:   Procedure Laterality Date    COLONOSCOPY N/A 2019    F/U  normal    COLONOSCOPY N/A 2024    F/U  normal robert no colitis    DAVINCI HYSTERECTOMY TOTAL, SALPINGECTOMY BILATERAL Bilateral 2024    ROBOT-ASSISTED; Surgeon: Maria Raman MD;  Location: [facility] OR.  Ovaries remain.    DAVINCI SACROCOLPOPEXY, CYSTOSCOPY, COMBINED N/A 2024    Procedure: SACROCOLPOPEXY, ROBOT-ASSISTED, LAPAROSCOPIC, WITH CYSTOSCOPY;  Surgeon: Ebenezer Kennedy MD;  Location:  "[facility] OR    ENDOSCOPY  12/23/2019    Pyloric ulcer    ESOPHAGOSCOPY, GASTROSCOPY, DUODENOSCOPY (EGD), COMBINED N/A 12/23/2019    Procedure: ESOPHAGOGASTRODUODENOSCOPY, WITH BIOPSY;  Surgeon: Oli Jackson MD;  Location: [facility] OR    EXTRACTION(S) DENTAL      [place] teeth extraction    LEEP TX, CERVICAL  2016    OTHER SURGICAL HISTORY      10/26/2017,[id number],ANKLE SURGERY,ligamentous repair; Dr. Ren     Current Outpatient Medications   Medication Sig Dispense Refill    estradiol (ESTRACE) 0.1 MG/GM vaginal cream Place 2 g vaginally twice a week 40 g 3    famotidine (PEPCID) 20 MG tablet Take 1 tablet (20 mg) by mouth 2 times daily as needed (heartburn) 60 tablet 3    latanoprost (XALATAN) 0.005 % ophthalmic solution Place 1 drop into both eyes daily      metroNIDAZOLE (METROCREAM) 0.75 % external cream Apply topically 2 times daily 45 g 3       Allergies   Allergen Reactions    Food Diarrhea and GI Disturbance    Gluten Meal GI Disturbance     \"flu like symptoms\"    Lactase-Lactobacillus Fatigue and GI Disturbance    Viburnum Oil Headache     migraine    Sulfa Antibiotics      Verified in Meditech: Y, Severity in Meditech: S  Other reaction(s): Erythema    Tilactase GI Disturbance     AND fatigue        Social History     Tobacco Use    Smoking status: Never     Passive exposure: Never    Smokeless tobacco: Never   Substance Use Topics    Alcohol use: Yes     Comment:  rare     Family History   Problem Relation Age of Onset    Diabetes Mother         Diabetes    Hypertension Mother         Hypertension    Heart Disease Mother         Heart Disease,pacemaker    Other - See Comments Mother         Stoke x 2    Hyperlipidemia Mother         Hyperlipidemia    Cerebrovascular Disease Mother     Diabetes Type 2  Mother     Other - See Comments Father         Actinic keratoses    Hypertension Father         Hypertension    Hyperlipidemia Father         Hyperlipidemia    Diabetes Type 2  Father         Diabetes type II    Deep " "Vein Thrombosis Father     Other - See Comments Sister         Gluten intolerance    Family History Negative Brother         Good Health     History   Drug Use No         Review of Systems    Review of Systems  Constitutional, neuro, ENT, endocrine, pulmonary, cardiac, gastrointestinal, genitourinary, musculoskeletal, integument and psychiatric systems are negative, except as otherwise noted.    Objective    /78   Pulse 79   Temp 98.6  F (37  C) (Tympanic)   Wt 74.7 kg (164 lb 9.6 oz)   LMP 02/27/2024   SpO2 99%   BMI 27.39 kg/m     Estimated body mass index is 27.39 kg/m  as calculated from the following:    Height as of 4/28/24: 1.651 m (5' 5\").    Weight as of this encounter: 74.7 kg (164 lb 9.6 oz).  Physical Exam  GENERAL: alert and no distress  EYES: Eyes grossly normal to inspection, PERRL and conjunctivae and sclerae normal  HENT: ear canals and TM's normal, nose and mouth without ulcers or lesions  NECK: no adenopathy, no asymmetry, masses, or scars  RESP: lungs clear to auscultation - no rales, rhonchi or wheezes  CV: regular rate and rhythm, normal S1 S2, no S3 or S4, no murmur, click or rub, no peripheral edema  ABDOMEN: soft, nontender, no hepatosplenomegaly, no masses and bowel sounds normal  MS: no gross musculoskeletal defects noted, no edema.  Mild clicking appreciated on right radial wrist with flexion extension.  Mild pain to palpation over the distal wrist bilaterally.  No swelling or bruising appreciated.  SKIN: no suspicious lesions or rashes  NEURO: Normal strength and tone, mentation intact and speech normal  PSYCH: mentation appears normal, affect normal/bright    Recent Labs   Lab Test 03/05/24  1306 02/09/24  1157 06/16/23  1434   HGB 13.0 13.1 13.3     --  266   NA  --   --  142   POTASSIUM  --   --  4.2   CR  --   --  0.61   A1C  --   --  5.3        Diagnostics  Recent Results (from the past 720 hour(s))   UA Macroscopic with reflex to Microscopic and Culture    " Collection Time: 04/28/24  2:09 PM    Specimen: Urine, Clean Catch   Result Value Ref Range    Color Urine Yellow Colorless, Straw, Light Yellow, Yellow    Appearance Urine Clear Clear    Glucose Urine Negative Negative mg/dL    Bilirubin Urine Negative Negative    Ketones Urine Negative Negative mg/dL    Specific Gravity Urine 1.030 1.000 - 1.030    Blood Urine Negative Negative    pH Urine 6.5 5.0 - 9.0    Protein Albumin Urine 20 (A) Negative mg/dL    Urobilinogen Urine Normal Normal, 2.0 mg/dL    Nitrite Urine Negative Negative    Leukocyte Esterase Urine Negative Negative    Mucus Urine Present (A) None Seen /LPF    RBC Urine 1 <=2 /HPF    WBC Urine 1 <=5 /HPF    Hyaline Casts Urine 1 <=2 /LPF   Multiplex Vaginal Panel by PCR    Collection Time: 04/28/24  3:17 PM    Specimen: Vagina; Swab   Result Value Ref Range    Bacterial Vaginosis Organism DNA Negative Negative    Candida Group DNA Not Detected Not Detected    Candida glabrata / Priscilla krusei DNA Not Detected Not Detected    Trichomonas vaginalis DNA Not Detected Not Detected   CBC with platelets and differential    Collection Time: 04/28/24  3:18 PM   Result Value Ref Range    WBC Count 6.9 4.0 - 11.0 10e3/uL    RBC Count 4.04 3.80 - 5.20 10e6/uL    Hemoglobin 12.8 11.7 - 15.7 g/dL    Hematocrit 37.9 35.0 - 47.0 %    MCV 94 78 - 100 fL    MCH 31.7 26.5 - 33.0 pg    MCHC 33.8 31.5 - 36.5 g/dL    RDW 12.2 10.0 - 15.0 %    Platelet Count 312 150 - 450 10e3/uL    % Neutrophils 52 %    % Lymphocytes 38 %    % Monocytes 7 %    % Eosinophils 1 %    % Basophils 1 %    % Immature Granulocytes 0 %    NRBCs per 100 WBC 0 <1 /100    Absolute Neutrophils 3.6 1.6 - 8.3 10e3/uL    Absolute Lymphocytes 2.6 0.8 - 5.3 10e3/uL    Absolute Monocytes 0.5 0.0 - 1.3 10e3/uL    Absolute Eosinophils 0.1 0.0 - 0.7 10e3/uL    Absolute Basophils 0.1 0.0 - 0.2 10e3/uL    Absolute Immature Granulocytes 0.0 <=0.4 10e3/uL    Absolute NRBCs 0.0 10e3/uL      EKG: Normal Sinus  Rhythm    Revised Cardiac Risk Index (RCRI)  The patient has the following serious cardiovascular risks for perioperative complications:   - No serious cardiac risks = 0 points     RCRI Interpretation: 0 points: Class I (very low risk - 0.4% complication rate)         Signed Electronically by: Cherie Doe PA-C  Copy of this evaluation report is provided to requesting physician.

## 2024-04-28 ENCOUNTER — OFFICE VISIT (OUTPATIENT)
Dept: FAMILY MEDICINE | Facility: OTHER | Age: 49
End: 2024-04-28
Payer: COMMERCIAL

## 2024-04-28 VITALS
RESPIRATION RATE: 16 BRPM | SYSTOLIC BLOOD PRESSURE: 105 MMHG | HEIGHT: 65 IN | HEART RATE: 86 BPM | DIASTOLIC BLOOD PRESSURE: 63 MMHG | OXYGEN SATURATION: 97 % | TEMPERATURE: 98.4 F | WEIGHT: 165.8 LBS | BODY MASS INDEX: 27.63 KG/M2

## 2024-04-28 DIAGNOSIS — R30.0 DYSURIA: Primary | ICD-10-CM

## 2024-04-28 DIAGNOSIS — N93.9 VAGINAL BLEEDING: ICD-10-CM

## 2024-04-28 DIAGNOSIS — R10.819 SUPRAPUBIC TENDERNESS: ICD-10-CM

## 2024-04-28 LAB
ALBUMIN UR-MCNC: 20 MG/DL
APPEARANCE UR: CLEAR
BACTERIAL VAGINOSIS VAG-IMP: NEGATIVE
BASOPHILS # BLD AUTO: 0.1 10E3/UL (ref 0–0.2)
BASOPHILS NFR BLD AUTO: 1 %
BILIRUB UR QL STRIP: NEGATIVE
CANDIDA DNA VAG QL NAA+PROBE: NOT DETECTED
CANDIDA GLABRATA / CANDIDA KRUSEI DNA: NOT DETECTED
COLOR UR AUTO: YELLOW
EOSINOPHIL # BLD AUTO: 0.1 10E3/UL (ref 0–0.7)
EOSINOPHIL NFR BLD AUTO: 1 %
ERYTHROCYTE [DISTWIDTH] IN BLOOD BY AUTOMATED COUNT: 12.2 % (ref 10–15)
GLUCOSE UR STRIP-MCNC: NEGATIVE MG/DL
HCT VFR BLD AUTO: 37.9 % (ref 35–47)
HGB BLD-MCNC: 12.8 G/DL (ref 11.7–15.7)
HGB UR QL STRIP: NEGATIVE
HYALINE CASTS: 1 /LPF
IMM GRANULOCYTES # BLD: 0 10E3/UL
IMM GRANULOCYTES NFR BLD: 0 %
KETONES UR STRIP-MCNC: NEGATIVE MG/DL
LEUKOCYTE ESTERASE UR QL STRIP: NEGATIVE
LYMPHOCYTES # BLD AUTO: 2.6 10E3/UL (ref 0.8–5.3)
LYMPHOCYTES NFR BLD AUTO: 38 %
MCH RBC QN AUTO: 31.7 PG (ref 26.5–33)
MCHC RBC AUTO-ENTMCNC: 33.8 G/DL (ref 31.5–36.5)
MCV RBC AUTO: 94 FL (ref 78–100)
MONOCYTES # BLD AUTO: 0.5 10E3/UL (ref 0–1.3)
MONOCYTES NFR BLD AUTO: 7 %
MUCOUS THREADS #/AREA URNS LPF: PRESENT /LPF
NEUTROPHILS # BLD AUTO: 3.6 10E3/UL (ref 1.6–8.3)
NEUTROPHILS NFR BLD AUTO: 52 %
NITRATE UR QL: NEGATIVE
NRBC # BLD AUTO: 0 10E3/UL
NRBC BLD AUTO-RTO: 0 /100
PH UR STRIP: 6.5 [PH] (ref 5–9)
PLATELET # BLD AUTO: 312 10E3/UL (ref 150–450)
RBC # BLD AUTO: 4.04 10E6/UL (ref 3.8–5.2)
RBC URINE: 1 /HPF
SP GR UR STRIP: 1.03 (ref 1–1.03)
T VAGINALIS DNA VAG QL NAA+PROBE: NOT DETECTED
UROBILINOGEN UR STRIP-MCNC: NORMAL MG/DL
WBC # BLD AUTO: 6.9 10E3/UL (ref 4–11)
WBC URINE: 1 /HPF

## 2024-04-28 PROCEDURE — 36415 COLL VENOUS BLD VENIPUNCTURE: CPT | Mod: ZL

## 2024-04-28 PROCEDURE — 99213 OFFICE O/P EST LOW 20 MIN: CPT | Mod: 24

## 2024-04-28 PROCEDURE — 81001 URINALYSIS AUTO W/SCOPE: CPT | Mod: ZL

## 2024-04-28 PROCEDURE — 85025 COMPLETE CBC W/AUTO DIFF WBC: CPT | Mod: ZL

## 2024-04-28 PROCEDURE — 0352U MULTIPLEX VAGINAL PANEL BY PCR: CPT | Mod: ZL

## 2024-04-28 ASSESSMENT — PAIN SCALES - GENERAL: PAINLEVEL: MILD PAIN (3)

## 2024-04-28 NOTE — NURSING NOTE
"Chief Complaint   Patient presents with    UTI       Initial /63   Pulse 86   Temp 98.4  F (36.9  C) (Tympanic)   Resp 16   Ht 1.651 m (5' 5\")   Wt 75.2 kg (165 lb 12.8 oz)   LMP 02/27/2024   SpO2 97%   BMI 27.59 kg/m   Estimated body mass index is 27.59 kg/m  as calculated from the following:    Height as of this encounter: 1.651 m (5' 5\").    Weight as of this encounter: 75.2 kg (165 lb 12.8 oz).  Medication Reconciliation: complete    Milena Mohan LPN on 4/28/2024 at 2:37 PM     "

## 2024-04-28 NOTE — PROGRESS NOTES
ASSESSMENT/PLAN:    I have reviewed the nursing notes.  I have reviewed the findings, diagnosis, plan and need for follow up with the patient.    1. Suprapubic tenderness  2. Vaginal bleeding  3. Dysuria    - UA Macroscopic with reflex to Microscopic and Culture- no signs of urinary tract infection    - Multiplex Vaginal Panel by PCR- negative results    - CBC and Differential- unremarkable results    - Discussed with patient that symptoms and exam are consistent with postop vaginal discomfort and bleeding.  Discussed that these are normal symptoms after a surgery like she had.    - Symptomatic treatment - Encouraged fluids, rest, warm packs for up to 20 minutes several times a day to abdomen for comfort as needed.    - May use over-the-counter Tylenol or ibuprofen as needed for discomfort.    - Discussed warning signs/symptoms indicative of need to f/u    - Follow up if symptoms persist or worsen or concerns    - I explained my diagnostic considerations and recommendations to the patient, who voiced understanding and agreement with the treatment plan. All questions were answered. We discussed potential side effects of any prescribed or recommended therapies, as well as expectations for response to treatments.    Chelsea Kennedy, DEONTE CNP  4/28/2024  2:51 PM    HPI:    Bibiana Lira is a 48 year old female who presents to [facility] today for concerns of a UTI. Symptoms consist of light pink color when wiping.  Saw provider for 6 week follow up 4/1//24 for hysterectomy.  States she was having some bleeding at that time and provider states that everything looked normal.  Sees [facility] on 5/1/24 for pre op.  States having shoulder surgery on 5/07/24.  States been having little twinges in her bladder.  Denies fever, shortness of breath, unusual vaginal discharge or odor.  Patient states that she is not sexually active.    Past Medical History:   Diagnosis Date    SAM II (cervical intraepithelial neoplasia II) 2016     "LEEP completed    Gastroesophageal reflux disease     Irritable bowel syndrome without diarrhea     avoids caramel color; gluten free    Personal history of other medical treatment (CODE)     , both vaginal deliveries.  Prolapsed uterus with first delivery.    Stomach ulcer 2019     Past Surgical History:   Procedure Laterality Date    COLONOSCOPY N/A 2019    F/U  normal    COLONOSCOPY N/A 2024    F/U  normal robert no colitis    DAVINCI HYSTERECTOMY TOTAL, SALPINGECTOMY BILATERAL Bilateral 2024    ROBOT-ASSISTED; Surgeon: Maria Raman MD;  Location: [facility] OR.  Ovaries remain.    DAVINCI SACROCOLPOPEXY, CYSTOSCOPY, COMBINED N/A 2024    Procedure: SACROCOLPOPEXY, ROBOT-ASSISTED, LAPAROSCOPIC, WITH CYSTOSCOPY;  Surgeon: Ebenezer Kennedy MD;  Location: [facility] OR    ENDOSCOPY  2019    Pyloric ulcer    ESOPHAGOSCOPY, GASTROSCOPY, DUODENOSCOPY (EGD), COMBINED N/A 2019    Procedure: ESOPHAGOGASTRODUODENOSCOPY, WITH BIOPSY;  Surgeon: Oli Jackson MD;  Location: [facility] OR    EXTRACTION(S) DENTAL      [place] teeth extraction    LEEP TX, CERVICAL  2016    OTHER SURGICAL HISTORY      10/26/2017,[id number],ANKLE SURGERY,ligamentous repair; Dr. Ren     Social History     Tobacco Use    Smoking status: Never     Passive exposure: Never    Smokeless tobacco: Never   Substance Use Topics    Alcohol use: Yes     Comment:  rare     Current Outpatient Medications   Medication Sig Dispense Refill    estradiol (ESTRACE) 0.1 MG/GM vaginal cream Place 2 g vaginally twice a week 40 g 3    famotidine (PEPCID) 20 MG tablet Take 1 tablet (20 mg) by mouth 2 times daily as needed (heartburn) 60 tablet 3    latanoprost (XALATAN) 0.005 % ophthalmic solution Place 1 drop into both eyes daily      metroNIDAZOLE (METROCREAM) 0.75 % external cream Apply topically 2 times daily 45 g 3     Allergies   Allergen Reactions    Food Diarrhea and GI Disturbance    Gluten Meal GI Disturbance     \"flu like " "symptoms\"    Lactase-Lactobacillus Fatigue and GI Disturbance    Camas Oil Headache     migraine    Sulfa Antibiotics      Verified in Meditech: Y, Severity in Meditech: S  Other reaction(s): Erythema    Tilactase GI Disturbance     AND fatigue     Past medical history, past surgical history, current medications and allergies reviewed and accurate to the best of my knowledge.      ROS:  Refer to HPI    /63   Pulse 86   Temp 98.4  F (36.9  C) (Tympanic)   Resp 16   Ht 1.651 m (5' 5\")   Wt 75.2 kg (165 lb 12.8 oz)   LMP 02/27/2024   SpO2 97%   BMI 27.59 kg/m      EXAM:  General Appearance: Well appearing 48 year old female, appropriate appearance for age. No acute distress   Respiratory: normal chest wall and respirations.  Normal effort.  Clear to auscultation bilaterally, no wheezing, crackles or rhonchi.  No increased work of breathing.  No cough appreciated.  Cardiac: RRR with no murmurs  Abdomen: soft, nontender, no rigidity, no rebound tenderness or guarding, normal bowel sounds present  :  Suprapubic tenderness to palpation.  Absent CVA tenderness to palpation.    Musculoskeletal:  Equal movement of bilateral upper extremities.  Equal movement of bilateral lower extremities.  Normal gait.    Neuro: Alert and oriented to person, place, and time.    Psychological: normal affect, alert, oriented, and pleasant.     Labs:  Results for orders placed or performed in visit on 04/28/24   UA Macroscopic with reflex to Microscopic and Culture     Status: Abnormal    Specimen: Urine, Clean Catch   Result Value Ref Range    Color Urine Yellow Colorless, Straw, Light Yellow, Yellow    Appearance Urine Clear Clear    Glucose Urine Negative Negative mg/dL    Bilirubin Urine Negative Negative    Ketones Urine Negative Negative mg/dL    Specific Gravity Urine 1.030 1.000 - 1.030    Blood Urine Negative Negative    pH Urine 6.5 5.0 - 9.0    Protein Albumin Urine 20 (A) Negative mg/dL    Urobilinogen Urine Normal " Normal, 2.0 mg/dL    Nitrite Urine Negative Negative    Leukocyte Esterase Urine Negative Negative    Mucus Urine Present (A) None Seen /LPF    RBC Urine 1 <=2 /HPF    WBC Urine 1 <=5 /HPF    Hyaline Casts Urine 1 <=2 /LPF    Narrative    Urine Culture not indicated   CBC with platelets and differential     Status: None   Result Value Ref Range    WBC Count 6.9 4.0 - 11.0 10e3/uL    RBC Count 4.04 3.80 - 5.20 10e6/uL    Hemoglobin 12.8 11.7 - 15.7 g/dL    Hematocrit 37.9 35.0 - 47.0 %    MCV 94 78 - 100 fL    MCH 31.7 26.5 - 33.0 pg    MCHC 33.8 31.5 - 36.5 g/dL    RDW 12.2 10.0 - 15.0 %    Platelet Count 312 150 - 450 10e3/uL    % Neutrophils 52 %    % Lymphocytes 38 %    % Monocytes 7 %    % Eosinophils 1 %    % Basophils 1 %    % Immature Granulocytes 0 %    NRBCs per 100 WBC 0 <1 /100    Absolute Neutrophils 3.6 1.6 - 8.3 10e3/uL    Absolute Lymphocytes 2.6 0.8 - 5.3 10e3/uL    Absolute Monocytes 0.5 0.0 - 1.3 10e3/uL    Absolute Eosinophils 0.1 0.0 - 0.7 10e3/uL    Absolute Basophils 0.1 0.0 - 0.2 10e3/uL    Absolute Immature Granulocytes 0.0 <=0.4 10e3/uL    Absolute NRBCs 0.0 10e3/uL   Multiplex Vaginal Panel by PCR     Status: Normal    Specimen: Vagina; Swab   Result Value Ref Range    Bacterial Vaginosis Organism DNA Negative Negative    Candida Group DNA Not Detected Not Detected    Candida glabrata / Priscilla krusei DNA Not Detected Not Detected    Trichomonas vaginalis DNA Not Detected Not Detected    Narrative    The Xpert  Xpress MVP test, performed on the [employer], is an automated, qualitative in vitro diagnostic test for the detection of DNA targets from anaerobic bacteria associated with bacterial vaginosis, Candida species associated with vulvovaginal candidiasis, and Trichomonas vaginalis. The assay uses clinician-collected and self-collected vaginal swabs from patients who are symptomatic for vaginitis/ vaginosis. The Xpert  Xpress MVP test utilizes real-time polymerase chain  reaction (PCR) for the amplification of specific DNA targets and utilizes fluorogenic target-specific hybridization probes to detect and differentiate DNA. It is intended to aid in the diagnosis of vaginal infections in women with a clinical presentation consistent with bacterial vaginosis, vulvovaginal candidiasis, or trichomoniasis.   The assay targets three anaerobic microorgansims that are associated with bacterial vaginosis (BV). Other organisms that are not detected by the Xpert  Xpress MVP test have also been reported to be associated with BV. The BV organism and Candida species targets of the Xpert  Xpress MVP test can be commensal in women; positive results must be considered in conjunction with other clinical and patient information to determine the disease status.   CBC and Differential     Status: None    Narrative    The following orders were created for panel order CBC and Differential.  Procedure                               Abnormality         Status                     ---------                               -----------         ------                     CBC with platelets and d...[[id number]]                      Final result                 Please view results for these tests on the individual orders.

## 2024-05-01 ENCOUNTER — OFFICE VISIT (OUTPATIENT)
Dept: FAMILY MEDICINE | Facility: OTHER | Age: 49
End: 2024-05-01
Attending: PHYSICIAN ASSISTANT
Payer: COMMERCIAL

## 2024-05-01 ENCOUNTER — HOSPITAL ENCOUNTER (OUTPATIENT)
Dept: GENERAL RADIOLOGY | Facility: OTHER | Age: 49
Discharge: HOME | End: 2024-05-01
Attending: PHYSICIAN ASSISTANT
Payer: COMMERCIAL

## 2024-05-01 VITALS
DIASTOLIC BLOOD PRESSURE: 78 MMHG | WEIGHT: 164.6 LBS | SYSTOLIC BLOOD PRESSURE: 124 MMHG | HEART RATE: 79 BPM | BODY MASS INDEX: 27.39 KG/M2 | TEMPERATURE: 98.6 F | OXYGEN SATURATION: 99 %

## 2024-05-01 DIAGNOSIS — G89.29 CHRONIC RIGHT SHOULDER PAIN: ICD-10-CM

## 2024-05-01 DIAGNOSIS — M25.531 RIGHT WRIST PAIN: ICD-10-CM

## 2024-05-01 DIAGNOSIS — M25.511 CHRONIC RIGHT SHOULDER PAIN: ICD-10-CM

## 2024-05-01 DIAGNOSIS — R12 HEARTBURN: ICD-10-CM

## 2024-05-01 DIAGNOSIS — Z01.818 PREOP GENERAL PHYSICAL EXAM: Primary | ICD-10-CM

## 2024-05-01 PROCEDURE — 73110 X-RAY EXAM OF WRIST: CPT | Mod: RT

## 2024-05-01 PROCEDURE — 93000 ELECTROCARDIOGRAM COMPLETE: CPT | Performed by: INTERNAL MEDICINE

## 2024-05-01 PROCEDURE — 99214 OFFICE O/P EST MOD 30 MIN: CPT | Performed by: PHYSICIAN ASSISTANT

## 2024-05-01 NOTE — PATIENT INSTRUCTIONS
Patient should take the following medications the morning of surgery with a small sip of water: hold all meds.  Patient was instructed to hold the following medications the morning of surgery: hold all meds.     Patient was advised to call our office and the surgical services with any change in condition or new symptoms if they were to develop between today and their surgical date.  Especially any cardiopulmonary symptoms or symptoms concerning for an infection.     Discontinue aspirin, aleve, naproxen and ibuprofen 7 days prior to surgery to reduce bleeding risk.  It is fine to take tylenol the week before surgery.  Hold vitamins and herbal remedies for 14 days before surgery.     Preparing for Your Surgery  Getting started  A nurse will call you to review your health history and instructions. They will give you an arrival time based on your scheduled surgery time. Please be ready to share:  Your doctor's clinic name and phone number  Your medical, surgical, and anesthesia history  A list of allergies and sensitivities  A list of medicines, including herbal treatments and over-the-counter drugs  Whether the patient has a legal guardian (ask how to send us the papers in advance)  Please tell us if you're pregnant--or if there's any chance you might be pregnant. Some surgeries may injure a fetus (unborn baby), so they require a pregnancy test. Surgeries that are safe for a fetus don't always need a test, and you can choose whether to have one.   If you have a child who's having surgery, please ask for a copy of Preparing for Your Child's Surgery.    Preparing for surgery  Within 10 to 30 days of surgery: Have a pre-op exam (sometimes called an H&P, or History and Physical). This can be done at a clinic or pre-operative center.  If you're having a , you may not need this exam. Talk to your care team.  At your pre-op exam, talk to your care team about all medicines you take. If you need to stop any medicines  before surgery, ask when to start taking them again.  We do this for your safety. Many medicines can make you bleed too much during surgery. Some change how well surgery (anesthesia) drugs work.  Call your insurance company to let them know you're having surgery. (If you don't have insurance, call [phone].)  Call your clinic if there's any change in your health. This includes signs of a cold or flu (sore throat, runny nose, cough, rash, fever). It also includes a scrape or scratch near the surgery site.  If you have questions on the day of surgery, call your hospital or surgery center.  Eating and drinking guidelines  For your safety: Unless your surgeon tells you otherwise, follow the guidelines below.  Eat and drink as usual until 8 hours before you arrive for surgery. After that, no food or milk.  Drink clear liquids until 2 hours before you arrive. These are liquids you can see through, like water, Gatorade, and Propel Water. They also include plain black coffee and tea (no cream or milk), candy, and breath mints. You can spit out gum when you arrive.  If you drink alcohol: Stop drinking it the night before surgery.  If your care team tells you to take medicine on the morning of surgery, it's okay to take it with a sip of water.  Preventing infection  Shower or bathe the night before and morning of your surgery. Follow the instructions your clinic gave you. (If no instructions, use regular soap.)  Don't shave or clip hair near your surgery site. We'll remove the hair if needed.  Don't smoke or vape the morning of surgery. You may chew nicotine gum up to 2 hours before surgery. A nicotine patch is okay.  Note: Some surgeries require you to completely quit smoking and nicotine. Check with your surgeon.  Your care team will make every effort to keep you safe from infection. We will:  Clean our hands often with soap and water (or an alcohol-based hand rub).  Clean the skin at your surgery site with a special  soap that kills germs.  Give you a special gown to keep you warm. (Cold raises the risk of infection.)  Wear special hair covers, masks, gowns and gloves during surgery.  Give antibiotic medicine, if prescribed. Not all surgeries need antibiotics.  What to bring on the day of surgery  Photo ID and insurance card  Copy of your health care directive, if you have one  Glasses and hearing aids (bring cases)  You can't wear contacts during surgery  Inhaler and eye drops, if you use them (tell us about these when you arrive)  CPAP machine or breathing device, if you use them  A few personal items, if spending the night  If you have . . .  A pacemaker, ICD (cardiac defibrillator) or other implant: Bring the ID card.  An implanted stimulator: Bring the remote control.  A legal guardian: Bring a copy of the certified (court-stamped) guardianship papers.  Please remove any jewelry, including body piercings. Leave jewelry and other valuables at home.  If you're going home the day of surgery  You must have a responsible adult drive you home. They should stay with you overnight as well.  If you don't have someone to stay with you, and you aren't safe to go home alone, we may keep you overnight. Insurance often won't pay for this.  After surgery  If it's hard to control your pain or you need more pain medicine, please call your surgeon's office.  Questions?   If you have any questions for your care team, list them here: _________________________________________________________________________________________________________________________________________________________________________ ____________________________________ ____________________________________ ____________________________________  For informational purposes only. Not to replace the advice of your health care provider. Copyright   2003, 2019 [facility]. All rights reserved. Clinically reviewed by Belem Iverson MD. [employer] 419147 - REV 12/22.    How  to Take Your Medication Before Surgery  - Take all of your medications before surgery except as noted below  - HOLD (do not take) Aspirin for 7 days  - STOP taking all vitamins and herbal supplements 14 days before surgery.

## 2024-05-01 NOTE — NURSING NOTE
"Chief Complaint   Patient presents with    Pre-Op Exam     Right shoulder surgery       Initial /78   Pulse 79   Temp 98.6  F (37  C) (Tympanic)   Wt 74.7 kg (164 lb 9.6 oz)   LMP 02/27/2024   SpO2 99%   BMI 27.39 kg/m   Estimated body mass index is 27.39 kg/m  as calculated from the following:    Height as of 4/28/24: 1.651 m (5' 5\").    Weight as of this encounter: 74.7 kg (164 lb 9.6 oz).  Medication Review: complete    The next two questions are to help us understand your food security.  If you are feeling you need any assistance in this area, we have resources available to support you today.          2/9/2024   SDOH- Food Insecurity   Within the past 12 months, did you worry that your food would run out before you got money to buy more? N   Within the past 12 months, did the food you bought just not last and you didn t have money to get more? N         Health Care Directive:  Patient has a Health Care Directive on file      Giovana Arvizu MA      "

## 2024-05-02 LAB
ATRIAL RATE - MUSE: 75 BPM
DIASTOLIC BLOOD PRESSURE - MUSE: NORMAL MMHG
INTERPRETATION ECG - MUSE: NORMAL
P AXIS - MUSE: 55 DEGREES
PR INTERVAL - MUSE: 164 MS
QRS DURATION - MUSE: 82 MS
QT - MUSE: 396 MS
QTC - MUSE: 442 MS
R AXIS - MUSE: 53 DEGREES
SYSTOLIC BLOOD PRESSURE - MUSE: NORMAL MMHG
T AXIS - MUSE: 31 DEGREES
VENTRICULAR RATE- MUSE: 75 BPM

## 2024-05-06 ENCOUNTER — ANESTHESIA EVENT (OUTPATIENT)
Dept: SURGERY | Facility: OTHER | Age: 49
End: 2024-05-06
Payer: COMMERCIAL

## 2024-05-07 ENCOUNTER — HOSPITAL ENCOUNTER (OUTPATIENT)
Facility: OTHER | Age: 49
Discharge: HOME | End: 2024-05-07
Attending: ORTHOPAEDIC SURGERY | Admitting: ORTHOPAEDIC SURGERY
Payer: COMMERCIAL

## 2024-05-07 ENCOUNTER — ANESTHESIA (OUTPATIENT)
Dept: SURGERY | Facility: OTHER | Age: 49
End: 2024-05-07
Payer: COMMERCIAL

## 2024-05-07 VITALS
BODY MASS INDEX: 27.32 KG/M2 | SYSTOLIC BLOOD PRESSURE: 105 MMHG | OXYGEN SATURATION: 93 % | HEIGHT: 65 IN | HEART RATE: 84 BPM | TEMPERATURE: 96.8 F | RESPIRATION RATE: 14 BRPM | DIASTOLIC BLOOD PRESSURE: 73 MMHG | WEIGHT: 164 LBS

## 2024-05-07 DIAGNOSIS — M25.511 CHRONIC RIGHT SHOULDER PAIN: Primary | ICD-10-CM

## 2024-05-07 DIAGNOSIS — G89.29 CHRONIC RIGHT SHOULDER PAIN: Primary | ICD-10-CM

## 2024-05-07 PROCEDURE — 250N000011 HC RX IP 250 OP 636

## 2024-05-07 PROCEDURE — 360N000077 HC SURGERY LEVEL 4, PER MIN: Performed by: ORTHOPAEDIC SURGERY

## 2024-05-07 PROCEDURE — 64415 NJX AA&/STRD BRCH PLXS IMG: CPT | Mod: RT

## 2024-05-07 PROCEDURE — 250N000025 HC SEVOFLURANE, PER MIN: Performed by: ORTHOPAEDIC SURGERY

## 2024-05-07 PROCEDURE — 250N000011 HC RX IP 250 OP 636: Performed by: NURSE ANESTHETIST, CERTIFIED REGISTERED

## 2024-05-07 PROCEDURE — 250N000013 HC RX MED GY IP 250 OP 250 PS 637: Performed by: ORTHOPAEDIC SURGERY

## 2024-05-07 PROCEDURE — 250N000011 HC RX IP 250 OP 636: Performed by: ORTHOPAEDIC SURGERY

## 2024-05-07 PROCEDURE — 250N000009 HC RX 250: Performed by: NURSE ANESTHETIST, CERTIFIED REGISTERED

## 2024-05-07 PROCEDURE — 29824 SHO ARTHRS SRG DSTL CLAVICLC: CPT | Mod: RT | Performed by: ORTHOPAEDIC SURGERY

## 2024-05-07 PROCEDURE — 29823 SHO ARTHRS SRG XTNSV DBRDMT: CPT | Performed by: NURSE ANESTHETIST, CERTIFIED REGISTERED

## 2024-05-07 PROCEDURE — 258N000001 HC RX 258: Performed by: ORTHOPAEDIC SURGERY

## 2024-05-07 PROCEDURE — 272N000001 HC OR GENERAL SUPPLY STERILE: Performed by: ORTHOPAEDIC SURGERY

## 2024-05-07 PROCEDURE — 29823 SHO ARTHRS SRG XTNSV DBRDMT: CPT | Mod: RT | Performed by: ORTHOPAEDIC SURGERY

## 2024-05-07 PROCEDURE — 258N000003 HC RX IP 258 OP 636: Performed by: NURSE ANESTHETIST, CERTIFIED REGISTERED

## 2024-05-07 PROCEDURE — 999N000141 HC STATISTIC PRE-PROCEDURE NURSING ASSESSMENT: Performed by: ORTHOPAEDIC SURGERY

## 2024-05-07 PROCEDURE — 370N000017 HC ANESTHESIA TECHNICAL FEE, PER MIN: Performed by: ORTHOPAEDIC SURGERY

## 2024-05-07 PROCEDURE — 29826 SHO ARTHRS SRG DECOMPRESSION: CPT | Mod: RT | Performed by: ORTHOPAEDIC SURGERY

## 2024-05-07 PROCEDURE — 710N000010 HC RECOVERY PHASE 1, LEVEL 2, PER MIN: Performed by: ORTHOPAEDIC SURGERY

## 2024-05-07 PROCEDURE — 710N000012 HC RECOVERY PHASE 2, PER MINUTE: Performed by: ORTHOPAEDIC SURGERY

## 2024-05-07 RX ORDER — PROPOFOL 10 MG/ML
INJECTION, EMULSION INTRAVENOUS PRN
Status: DISCONTINUED | OUTPATIENT
Start: 2024-05-07 | End: 2024-05-07

## 2024-05-07 RX ORDER — DEXAMETHASONE SODIUM PHOSPHATE 10 MG/ML
4 INJECTION, SOLUTION INTRAMUSCULAR; INTRAVENOUS
Status: DISCONTINUED | OUTPATIENT
Start: 2024-05-07 | End: 2024-05-07 | Stop reason: HOSPADM

## 2024-05-07 RX ORDER — HYDROCODONE BITARTRATE AND ACETAMINOPHEN 5; 325 MG/1; MG/1
1 TABLET ORAL
Status: COMPLETED | OUTPATIENT
Start: 2024-05-07 | End: 2024-05-07

## 2024-05-07 RX ORDER — PROPOFOL 10 MG/ML
INJECTION, EMULSION INTRAVENOUS CONTINUOUS PRN
Status: DISCONTINUED | OUTPATIENT
Start: 2024-05-07 | End: 2024-05-07

## 2024-05-07 RX ORDER — SODIUM CHLORIDE, SODIUM LACTATE, POTASSIUM CHLORIDE, CALCIUM CHLORIDE 600; 310; 30; 20 MG/100ML; MG/100ML; MG/100ML; MG/100ML
INJECTION, SOLUTION INTRAVENOUS CONTINUOUS
Status: DISCONTINUED | OUTPATIENT
Start: 2024-05-07 | End: 2024-05-07 | Stop reason: HOSPADM

## 2024-05-07 RX ORDER — NALOXONE HYDROCHLORIDE 0.4 MG/ML
0.1 INJECTION, SOLUTION INTRAMUSCULAR; INTRAVENOUS; SUBCUTANEOUS
Status: DISCONTINUED | OUTPATIENT
Start: 2024-05-07 | End: 2024-05-07 | Stop reason: HOSPADM

## 2024-05-07 RX ORDER — CEFAZOLIN SODIUM/WATER 2 G/20 ML
2 SYRINGE (ML) INTRAVENOUS
Status: COMPLETED | OUTPATIENT
Start: 2024-05-07 | End: 2024-05-07

## 2024-05-07 RX ORDER — ACETAMINOPHEN 325 MG/1
975 TABLET ORAL ONCE
Status: COMPLETED | OUTPATIENT
Start: 2024-05-07 | End: 2024-05-07

## 2024-05-07 RX ORDER — HYDROCODONE BITARTRATE AND ACETAMINOPHEN 5; 325 MG/1; MG/1
1 TABLET ORAL EVERY 4 HOURS PRN
Qty: 25 TABLET | Refills: 0 | Status: SHIPPED | OUTPATIENT
Start: 2024-05-07

## 2024-05-07 RX ORDER — LIDOCAINE 40 MG/G
CREAM TOPICAL
Status: DISCONTINUED | OUTPATIENT
Start: 2024-05-07 | End: 2024-05-07 | Stop reason: HOSPADM

## 2024-05-07 RX ORDER — HYDROMORPHONE HCL IN WATER/PF 6 MG/30 ML
0.4 PATIENT CONTROLLED ANALGESIA SYRINGE INTRAVENOUS EVERY 5 MIN PRN
Status: DISCONTINUED | OUTPATIENT
Start: 2024-05-07 | End: 2024-05-07 | Stop reason: HOSPADM

## 2024-05-07 RX ORDER — ONDANSETRON 4 MG/1
4 TABLET, ORALLY DISINTEGRATING ORAL
Status: DISCONTINUED | OUTPATIENT
Start: 2024-05-07 | End: 2024-05-07 | Stop reason: HOSPADM

## 2024-05-07 RX ORDER — OXYCODONE HYDROCHLORIDE 5 MG/1
5 TABLET ORAL
Status: DISCONTINUED | OUTPATIENT
Start: 2024-05-07 | End: 2024-05-07 | Stop reason: HOSPADM

## 2024-05-07 RX ORDER — HYDROMORPHONE HCL IN WATER/PF 6 MG/30 ML
0.2 PATIENT CONTROLLED ANALGESIA SYRINGE INTRAVENOUS EVERY 5 MIN PRN
Status: DISCONTINUED | OUTPATIENT
Start: 2024-05-07 | End: 2024-05-07 | Stop reason: HOSPADM

## 2024-05-07 RX ORDER — DEXAMETHASONE SODIUM PHOSPHATE 4 MG/ML
INJECTION, SOLUTION INTRA-ARTICULAR; INTRALESIONAL; INTRAMUSCULAR; INTRAVENOUS; SOFT TISSUE PRN
Status: DISCONTINUED | OUTPATIENT
Start: 2024-05-07 | End: 2024-05-07

## 2024-05-07 RX ORDER — CEFAZOLIN SODIUM/WATER 2 G/20 ML
2 SYRINGE (ML) INTRAVENOUS SEE ADMIN INSTRUCTIONS
Status: DISCONTINUED | OUTPATIENT
Start: 2024-05-07 | End: 2024-05-07 | Stop reason: HOSPADM

## 2024-05-07 RX ORDER — ONDANSETRON 4 MG/1
4 TABLET, ORALLY DISINTEGRATING ORAL EVERY 8 HOURS PRN
Qty: 4 TABLET | Refills: 0 | Status: SHIPPED | OUTPATIENT
Start: 2024-05-07

## 2024-05-07 RX ORDER — ONDANSETRON 2 MG/ML
4 INJECTION INTRAMUSCULAR; INTRAVENOUS EVERY 30 MIN PRN
Status: DISCONTINUED | OUTPATIENT
Start: 2024-05-07 | End: 2024-05-07 | Stop reason: HOSPADM

## 2024-05-07 RX ORDER — FENTANYL CITRATE 50 UG/ML
25 INJECTION, SOLUTION INTRAMUSCULAR; INTRAVENOUS
Status: DISCONTINUED | OUTPATIENT
Start: 2024-05-07 | End: 2024-05-07 | Stop reason: HOSPADM

## 2024-05-07 RX ORDER — ONDANSETRON 2 MG/ML
INJECTION INTRAMUSCULAR; INTRAVENOUS PRN
Status: DISCONTINUED | OUTPATIENT
Start: 2024-05-07 | End: 2024-05-07

## 2024-05-07 RX ORDER — FENTANYL CITRATE 50 UG/ML
25 INJECTION, SOLUTION INTRAMUSCULAR; INTRAVENOUS EVERY 5 MIN PRN
Status: DISCONTINUED | OUTPATIENT
Start: 2024-05-07 | End: 2024-05-07 | Stop reason: HOSPADM

## 2024-05-07 RX ORDER — LIDOCAINE HYDROCHLORIDE 20 MG/ML
INJECTION, SOLUTION INFILTRATION; PERINEURAL PRN
Status: DISCONTINUED | OUTPATIENT
Start: 2024-05-07 | End: 2024-05-07

## 2024-05-07 RX ORDER — BUPIVACAINE HYDROCHLORIDE 5 MG/ML
INJECTION, SOLUTION EPIDURAL; INTRACAUDAL
Status: COMPLETED | OUTPATIENT
Start: 2024-05-07 | End: 2024-05-07

## 2024-05-07 RX ORDER — ONDANSETRON 4 MG/1
4 TABLET, ORALLY DISINTEGRATING ORAL EVERY 30 MIN PRN
Status: DISCONTINUED | OUTPATIENT
Start: 2024-05-07 | End: 2024-05-07 | Stop reason: HOSPADM

## 2024-05-07 RX ORDER — OXYCODONE HYDROCHLORIDE 5 MG/1
10 TABLET ORAL
Status: DISCONTINUED | OUTPATIENT
Start: 2024-05-07 | End: 2024-05-07 | Stop reason: HOSPADM

## 2024-05-07 RX ORDER — FENTANYL CITRATE 50 UG/ML
50 INJECTION, SOLUTION INTRAMUSCULAR; INTRAVENOUS EVERY 5 MIN PRN
Status: DISCONTINUED | OUTPATIENT
Start: 2024-05-07 | End: 2024-05-07 | Stop reason: HOSPADM

## 2024-05-07 RX ORDER — DEXAMETHASONE SODIUM PHOSPHATE 10 MG/ML
INJECTION, SOLUTION INTRAMUSCULAR; INTRAVENOUS
Status: COMPLETED | OUTPATIENT
Start: 2024-05-07 | End: 2024-05-07

## 2024-05-07 RX ADMIN — DEXAMETHASONE SODIUM PHOSPHATE 4 MG: 4 INJECTION, SOLUTION INTRA-ARTICULAR; INTRALESIONAL; INTRAMUSCULAR; INTRAVENOUS; SOFT TISSUE at 09:58

## 2024-05-07 RX ADMIN — HYDROCODONE BITARTRATE AND ACETAMINOPHEN 1 TABLET: 5; 325 TABLET ORAL at 12:05

## 2024-05-07 RX ADMIN — PROPOFOL 200 MG: 10 INJECTION, EMULSION INTRAVENOUS at 09:58

## 2024-05-07 RX ADMIN — BUPIVACAINE HYDROCHLORIDE 20 ML: 5 INJECTION, SOLUTION EPIDURAL; INTRACAUDAL; PERINEURAL at 09:46

## 2024-05-07 RX ADMIN — Medication 2 G: at 09:49

## 2024-05-07 RX ADMIN — MIDAZOLAM 2 MG: 1 INJECTION INTRAMUSCULAR; INTRAVENOUS at 09:33

## 2024-05-07 RX ADMIN — LIDOCAINE HYDROCHLORIDE 40 MG: 20 INJECTION, SOLUTION INFILTRATION; PERINEURAL at 09:58

## 2024-05-07 RX ADMIN — PROPOFOL 100 MCG/KG/MIN: 10 INJECTION, EMULSION INTRAVENOUS at 09:58

## 2024-05-07 RX ADMIN — ONDANSETRON HYDROCHLORIDE 4 MG: 2 SOLUTION INTRAMUSCULAR; INTRAVENOUS at 09:58

## 2024-05-07 RX ADMIN — DEXAMETHASONE SODIUM PHOSPHATE 10 MG: 10 INJECTION, SOLUTION INTRAMUSCULAR; INTRAVENOUS at 09:33

## 2024-05-07 RX ADMIN — SODIUM CHLORIDE, POTASSIUM CHLORIDE, SODIUM LACTATE AND CALCIUM CHLORIDE 100 ML/HR: 600; 310; 30; 20 INJECTION, SOLUTION INTRAVENOUS at 09:33

## 2024-05-07 RX ADMIN — ACETAMINOPHEN 975 MG: 325 TABLET, FILM COATED ORAL at 08:57

## 2024-05-07 ASSESSMENT — ACTIVITIES OF DAILY LIVING (ADL)
ADLS_ACUITY_SCORE: 35

## 2024-05-07 NOTE — ANESTHESIA PROCEDURE NOTES
Airway       Patient location during procedure: OR  Staff -        CRNA: Juli Nelson APRN CRNA       Performed By: CRNA  Consent for Airway        Urgency: elective  Indications and Patient Condition       Indications for airway management: reena-procedural       Induction type:intravenous       Mask difficulty assessment: 0 - not attempted    Final Airway Details       Final airway type: supraglottic airway    Supraglottic Airway Details        Type: LMA       Brand: I-Gel       LMA size: 4    Post intubation assessment        Placement verified by: capnometry        Number of attempts at approach: 1       Secured with: tape       Ease of procedure: easy       Dentition: Intact

## 2024-05-07 NOTE — BRIEF OP NOTE
[facility]    Brief Operative Note    Pre-operative diagnosis: Chronic right shoulder pain [M25.511, G89.29]  Post-operative diagnosis Same as pre-operative diagnosis    Procedure: Arthroscopy shoulder  Subacromial Decompression, Distal Clavicle Resection,  ROTATOR  debridement, Right - Shoulder    Surgeon: Surgeons and Role:     * Jefferson Mills MD - Primary     * Sal Salas PA-C - Assisting  Anesthesia: General with Block   Estimated Blood Loss: Less than 10 ml    Drains: None  Specimens: * No specimens in log *  Findings:   None.  Complications: None.  Implants: * No implants in log *

## 2024-05-07 NOTE — OP NOTE
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  #2 distal clavicle resection last 10 mm #3 debridement bursa labrum as well as rotator cuff      Surgeon:  Jefferson Mills MD    Assistant:  Sal GRAY    Anesthesia: General endotracheal with interscalene block    Indications: Patient is a 48-year-old with right shoulder pain progressive in nature unresponsive to his current conservative measure recommendation was for shoulder arthroscopy decompression and indicated treatment.    Description: Patient was taken to the operative suite ministered anesthesia.  Following duction placed in lateral decubitus position right upper arm prepped and draped in our sterile fashion.  At this point time posterior portal was established after: Timeout and antibiotics being delivered.  Glandular inspection done small phalangeal head noted debridement carried out there subscap biceps intact.  Space entered extensive bursectomy carried out and decompression carried out as well as distal clavicle resection.  Patient's patient was excessively tight as such we did do a manipulation of that shoulder joint as well to free up that space secondary to her adhesions.  We then expected the rotator cuff did not identify any significant tearing debridement carried out there plus it extensive debridement of the bursa.  We are satisfied with her subacromial space or distal clavicle resection in terms of spatial alignment to the rotator cuff.  At this point time the scope was withdrawn portals closed with nylon interrupted fashion followed occasional soft postsurgical dressing.    Postoperative plan: Range of motion as tolerated.  Sling anticipate for 2 to 3 weeks.  Physical therapy program recommended.  Follow-up assessment here in 1 week.  Codman exercises okay postop day #2.    Physician Assistant statement: A  skilled first assistant was necessary for completion of the procedure in a technically safe and efficient manner. He was imperative to intraoperative decision making, retraction, prepping, draping, and wound closure

## 2024-05-07 NOTE — ANESTHESIA POSTPROCEDURE EVALUATION
Patient: Bibiana Lira    Procedure: Procedure(s):  Arthroscopy shoulder  Subacromial Decompression, Distal Clavicle Resection,  ROTATOR  debridement       Anesthesia Type:  General    Note:  Disposition: Outpatient   Postop Pain Control: Uneventful            Sign Out: Well controlled pain   PONV: No   Neuro/Psych: Uneventful            Sign Out: Acceptable/Baseline neuro status   Airway/Respiratory: Uneventful            Sign Out: Acceptable/Baseline resp. status   CV/Hemodynamics: Uneventful            Sign Out: Acceptable CV status; No obvious hypovolemia; No obvious fluid overload   Other NRE: NONE   DID A NON-ROUTINE EVENT OCCUR?            Last vitals:  Vitals Value Taken Time   /62 05/07/24 1115   Temp 96.8  F (36  C) 05/07/24 1105   Pulse 64 05/07/24 1117   Resp 22 05/07/24 1117   SpO2 94 % 05/07/24 1117   Vitals shown include unfiled device data.    Electronically Signed By: DEONTE Tamez CRNA  May 7, 2024  12:55 PM

## 2024-05-07 NOTE — DISCHARGE INSTRUCTIONS
Surgeon Contact Information  If you have questions or concerns related to your procedure please contact your surgeon through [facility] at [phone].     [facility]  Adult Discharge Orders & Instructions      For 24 hours after surgery:  Get plenty of rest.  A responsible adult must stay with you for at least 24 hours after you leave the hospital.   You may feel lightheaded.  IF so, sit for a few minutes before standing.  Have someone help you get up.   You may have a slight fever. Call the doctor if your fever is over 101 F (38.3 C) (taken under the tongue) or lasts longer than 24 hours.  You may have a dry mouth, a sore throat, muscle aches or trouble sleeping.  These should go away after 24 hours.  Do not make important or legal decisions.  6.   Do not drive or use heavy equipment.  If you have weakness or tingling, don't drive or use heavy equipment until this feeling goes away.                                                                                                                                                                           To contact a doctor, call    [phone]______________

## 2024-05-07 NOTE — ANESTHESIA POSTPROCEDURE EVALUATION
Patient: Bibiana Lira    Procedure: Procedure(s):  Arthroscopy shoulder  Subacromial Decompression, Distal Clavicle Resection,  ROTATOR  debridement       Anesthesia Type:  General    Note:  Anesthesia Post Evaluation    Last vitals:  Vitals Value Taken Time   /62 05/07/24 1115   Temp 96.8  F (36  C) 05/07/24 1105   Pulse 64 05/07/24 1117   Resp 22 05/07/24 1117   SpO2 94 % 05/07/24 1117   Vitals shown include unfiled device data.    Electronically Signed By: DEONTE Tamez CRNA  May 7, 2024  12:56 PM

## 2024-05-07 NOTE — ANESTHESIA PREPROCEDURE EVALUATION
"Anesthesia Pre-Procedure Evaluation    Patient: Bibiana Lira   MRN: [id number] : 1975        Procedure : Procedure(s):  Arthroscopy shoulder  Subacromial Decompression, Distal Clavicle Resection,  labral debridement          Past Medical History:   Diagnosis Date    SAM II (cervical intraepithelial neoplasia II) 2016    LEEP completed    Gastroesophageal reflux disease     Irritable bowel syndrome without diarrhea     avoids caramel color; gluten free    Personal history of other medical treatment (CODE)     , both vaginal deliveries.  Prolapsed uterus with first delivery.    Stomach ulcer 2019      Past Surgical History:   Procedure Laterality Date    COLONOSCOPY N/A 2019    F/U  normal    COLONOSCOPY N/A 2024    F/U  normal robert no colitis    DAVINCI HYSTERECTOMY TOTAL, SALPINGECTOMY BILATERAL Bilateral 2024    ROBOT-ASSISTED; Surgeon: Maria Raman MD;  Location: [facility] OR.  Ovaries remain.    DAVINCI SACROCOLPOPEXY, CYSTOSCOPY, COMBINED N/A 2024    Procedure: SACROCOLPOPEXY, ROBOT-ASSISTED, LAPAROSCOPIC, WITH CYSTOSCOPY;  Surgeon: Ebenezer Kennedy MD;  Location: [facility] OR    ENDOSCOPY  2019    Pyloric ulcer    ESOPHAGOSCOPY, GASTROSCOPY, DUODENOSCOPY (EGD), COMBINED N/A 2019    Procedure: ESOPHAGOGASTRODUODENOSCOPY, WITH BIOPSY;  Surgeon: Oli Jackson MD;  Location: [facility] OR    EXTRACTION(S) DENTAL      [place] teeth extraction    LEEP TX, CERVICAL  2016    OTHER SURGICAL HISTORY      10/26/2017,[id number],ANKLE SURGERY,ligamentous repair; Dr. Ren      Allergies   Allergen Reactions    Food Diarrhea and GI Disturbance    Gluten Meal GI Disturbance     \"flu like symptoms\"    Lactase-Lactobacillus Fatigue and GI Disturbance    [place] Oil Headache     migraine    Sulfa Antibiotics      Verified in [employer]: Y, Severity in [employer]: S  Other reaction(s): Erythema    Tilactase GI Disturbance     AND fatigue      Social History     Tobacco Use    Smoking " "status: Never     Passive exposure: Never    Smokeless tobacco: Never   Substance Use Topics    Alcohol use: Yes     Comment:  rare      Wt Readings from Last 1 Encounters:   05/07/24 74.4 kg (164 lb)        Anesthesia Evaluation   Pt has had prior anesthetic. Type: General.    No history of anesthetic complications       ROS/MED HX  ENT/Pulmonary:       Neurologic:     (+)      migraines,                          Cardiovascular:       METS/Exercise Tolerance: >4 METS    Hematologic:       Musculoskeletal:       GI/Hepatic: Comment: GERD asymptomatic    (+) GERD,                   Renal/Genitourinary:       Endo:       Psychiatric/Substance Use:       Infectious Disease:       Malignancy:       Other: Comment: Hx hysterectomy           Physical Exam    Airway        Mallampati: I   TM distance: > 3 FB   Neck ROM: full   Mouth opening: > 3 cm    Respiratory Devices and Support         Dental           Cardiovascular   cardiovascular exam normal          Pulmonary   pulmonary exam normal                OUTSIDE LABS:  CBC:   Lab Results   Component Value Date    WBC 6.9 04/28/2024    WBC 5.4 03/05/2024    HGB 12.8 04/28/2024    HGB 13.0 03/05/2024    HCT 37.9 04/28/2024    HCT 38.8 03/05/2024     04/28/2024     03/05/2024     BMP:   Lab Results   Component Value Date     06/16/2023     12/10/2020    POTASSIUM 4.2 06/16/2023    POTASSIUM 3.6 12/10/2020    CHLORIDE 105 06/16/2023    CHLORIDE 104 12/10/2020    CO2 27 06/16/2023    CO2 25 12/10/2020    BUN 11.9 06/16/2023    BUN 9 12/10/2020    CR 0.61 06/16/2023    CR 0.67 12/10/2020     (H) 06/16/2023     12/10/2020     COAGS: No results found for: \"PTT\", \"INR\", \"FIBR\"  POC:   Lab Results   Component Value Date    HCG Negative 12/18/2019    HCGS Negative 03/05/2024     HEPATIC:   Lab Results   Component Value Date    ALBUMIN 4.3 12/10/2020    PROTTOTAL 7.4 12/10/2020    ALT 16 12/10/2020    AST 18 12/10/2020    ALKPHOS 69 " "12/10/2020    BILITOTAL 0.5 12/10/2020     OTHER:   Lab Results   Component Value Date    A1C 5.3 06/16/2023    ARVIND 9.4 06/16/2023    LIPASE 18 12/16/2019    AMYLASE 20 (L) 12/16/2019    T4 0.61 04/09/2018       Anesthesia Plan    ASA Status:  2    NPO Status:  NPO Appropriate    Anesthesia Type: General.              Consents    Anesthesia Plan(s) and associated risks, benefits, and realistic alternatives discussed. Questions answered and patient/representative(s) expressed understanding.     - Discussed: Risks, Benefits and Alternatives for BOTH SEDATION and the PROCEDURE were discussed     - Discussed with:  Patient      - Extended Intubation/Ventilatory Support Discussed: No.      - Patient is DNR/DNI Status: No     Use of blood products discussed: No .     Postoperative Care    Pain management: Peripheral nerve block (Single Shot).        Comments:               DEONTE Tamez CRNA    I have reviewed the pertinent notes and labs in the chart from the past 30 days and (re)examined the patient.  Any updates or changes from those notes are reflected in this note.              # Overweight: Estimated body mass index is 27.29 kg/m  as calculated from the following:    Height as of this encounter: 1.651 m (5' 5\").    Weight as of this encounter: 74.4 kg (164 lb).      "

## 2024-05-07 NOTE — OR NURSING
PACU Transfer Note    Bibiana Lira was transferred to [facility] via cart.  Equipment used for transport:  none.  Accompanied by:  isrrael steiner  Prescriptions were: erx    PACU Respiratory Event Documentation     1) Episodes of Apnea greater than or equal to 10 seconds: 0    2) Bradypnea - less than 8 breaths per minute: 0    3) Pain score on 0 to 10 scale: 3    4) Pain-sedation mismatch (yes or no): no    5) Repeated 02 desaturation less than 90% (yes or no): no    Anesthesia notified? (yes or no): no    Any of the above events occuring repeatedly in separate 30 minute intervals may be considered recurrent PACU respiratory events.    Patient stable and meets phase 1 discharge criteria for transport from PACU.    Report to Isrrael Warren

## 2024-05-07 NOTE — ANESTHESIA CARE TRANSFER NOTE
Patient: Bibiana Lira    Procedure: Procedure(s):  Arthroscopy shoulder  Subacromial Decompression, Distal Clavicle Resection,  ROTATOR  debridement       Diagnosis: Chronic right shoulder pain [M25.511, G89.29]  Diagnosis Additional Information: No value filed.    Anesthesia Type:   General     Note:    Oropharynx: oropharynx clear of all foreign objects  Level of Consciousness: awake  Oxygen Supplementation: room air    Independent Airway: airway patency satisfactory and stable    Vital Signs Stable: post-procedure vital signs reviewed and stable  Report to RN Given: handoff report given  Patient transferred to: PACU    Handoff Report: Identifed the Patient, Identified the Reponsible Provider, Reviewed the pertinent medical history, Discussed the surgical course, Reviewed Intra-OP anesthesia mangement and issues during anesthesia, Set expectations for post-procedure period and Allowed opportunity for questions and acknowledgement of understanding      Vitals:  Vitals Value Taken Time   /65 05/07/24 1055   Temp 96.8  F (36  C) 05/07/24 1053   Pulse 73 05/07/24 1058   Resp 10 05/07/24 1058   SpO2 96 % 05/07/24 1058   Vitals shown include unfiled device data.    Electronically Signed By: DEONTE PAN CRNA  May 7, 2024  11:00 AM

## 2024-05-07 NOTE — OR NURSING
Patient has been discharged to home at 1235 via wheelchair accompanied by her sister    Written discharge instructions were provided to patient.  Prescriptions were sent to [facility] for .  Patient states their pain is tolerable, and denies any nausea or dizziness upon discharge.    Patient and adult caring for them verbalize understanding of discharge instructions including no driving until tomorrow and no longer taking narcotic pain medications - no operating mechanical equipment and no making any important decisions.They understand reason for discharge, and necessary follow-up appointments.       Briana Freeman RN

## 2024-05-15 ENCOUNTER — OFFICE VISIT (OUTPATIENT)
Dept: ORTHOPEDICS | Facility: OTHER | Age: 49
End: 2024-05-15
Attending: ORTHOPAEDIC SURGERY
Payer: COMMERCIAL

## 2024-05-15 ENCOUNTER — THERAPY VISIT (OUTPATIENT)
Dept: OCCUPATIONAL THERAPY | Facility: OTHER | Age: 49
End: 2024-05-15
Attending: ORTHOPAEDIC SURGERY
Payer: COMMERCIAL

## 2024-05-15 DIAGNOSIS — G89.29 CHRONIC RIGHT SHOULDER PAIN: Primary | ICD-10-CM

## 2024-05-15 DIAGNOSIS — M25.511 CHRONIC RIGHT SHOULDER PAIN: Primary | ICD-10-CM

## 2024-05-15 PROCEDURE — 97110 THERAPEUTIC EXERCISES: CPT | Mod: GO | Performed by: OCCUPATIONAL THERAPIST

## 2024-05-15 PROCEDURE — 97165 OT EVAL LOW COMPLEX 30 MIN: CPT | Mod: GO | Performed by: OCCUPATIONAL THERAPIST

## 2024-05-15 PROCEDURE — 99024 POSTOP FOLLOW-UP VISIT: CPT | Performed by: ORTHOPAEDIC SURGERY

## 2024-05-15 NOTE — PROGRESS NOTES
SUBJECTIVE:  Patient returns status post right shoulder arthroscopy decompression distal clavicle resection in addition to manipulation under anesthesia.  Patient does report some improvements in her symptoms at this point.  Patient is here for suture removal and examination.    ROS: Musculoskeletal and general review of systems are negative, per review of previous clinic questionnaire.  Denies SOB and calf pain.    EXAM: Right shoulder exam shows incision site to be healing without signs or symptoms of infection present.  Neuro exam is otherwise intact.  Codman's is well tolerable.    IMAGING: None     ASSESSMENT: right shoulder arthroscopy with decompression distal clavicle resection and manipulation.      PLAN: Continue PT process.  Discontinue sling when comfortable.  Would advocate for more flexibility rather than immobilization given her underlying pathology of adhesions.  Recheck exam in 1 month.  Released back to work under light duty restrictions hearing test duty only affected tomorrow.    Jefferson Mills MD

## 2024-05-17 ENCOUNTER — THERAPY VISIT (OUTPATIENT)
Dept: OCCUPATIONAL THERAPY | Facility: OTHER | Age: 49
End: 2024-05-17
Attending: ORTHOPAEDIC SURGERY
Payer: COMMERCIAL

## 2024-05-17 DIAGNOSIS — M25.511 CHRONIC RIGHT SHOULDER PAIN: Primary | ICD-10-CM

## 2024-05-17 DIAGNOSIS — G89.29 CHRONIC RIGHT SHOULDER PAIN: Primary | ICD-10-CM

## 2024-05-17 PROCEDURE — 97110 THERAPEUTIC EXERCISES: CPT | Mod: GO | Performed by: OCCUPATIONAL THERAPIST

## 2024-05-17 PROCEDURE — 97016 VASOPNEUMATIC DEVICE THERAPY: CPT | Mod: GO | Performed by: OCCUPATIONAL THERAPIST

## 2024-05-22 ENCOUNTER — THERAPY VISIT (OUTPATIENT)
Dept: OCCUPATIONAL THERAPY | Facility: OTHER | Age: 49
End: 2024-05-22
Attending: ORTHOPAEDIC SURGERY
Payer: COMMERCIAL

## 2024-05-22 DIAGNOSIS — M25.511 CHRONIC RIGHT SHOULDER PAIN: Primary | ICD-10-CM

## 2024-05-22 DIAGNOSIS — G89.29 CHRONIC RIGHT SHOULDER PAIN: Primary | ICD-10-CM

## 2024-05-22 PROCEDURE — 97110 THERAPEUTIC EXERCISES: CPT | Mod: GO | Performed by: OCCUPATIONAL THERAPIST

## 2024-05-22 PROCEDURE — 97140 MANUAL THERAPY 1/> REGIONS: CPT | Mod: GO | Performed by: OCCUPATIONAL THERAPIST

## 2024-05-23 NOTE — PROGRESS NOTES
OCCUPATIONAL THERAPY EVALUATION  Type of Visit: Evaluation    See electronic medical record for Abuse and Falls Screening details.    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 She developed significant adhesion limiting shoulder flexion to about 90 degrees. she under went shoulder arthoplasty to repeare a labral tear. while she was under anathesia full ROM was performed to break up adhesion. She is 8 days post-op. she report pain levels of 1-8 described as burning, numbness, tingling. dull sharps and achy. The pain is worse with use and better wit OTC pain relievers, cold, and rest.      Presenting condition or subjective complaint:    Date of onset:       Dates & types of surgery:  5/7/2024     Prior Level of Function  Transfers: Independent  Ambulation: Independent  ADL: Independent  IADL: Childcare, Driving, Finances, Housekeeping, Laundry, Meal preparation, Work    Patient goals for therapy:  To improve shoulder strength and ROM with less pain    Pain assessment: See objective evaluation for additional pain details     Objective   See flow sheet    Assessment & Plan   CLINICAL IMPRESSIONS  Medical Diagnosis:    s/p rt Arthroscopy shoulder Subacromial Decompression, Distal Clavicle Resection, labral debridement 5/7 with Rother   Treatment Diagnosis:    impaired work and self cares   Impression/Assessment: Pt is a 48 year old female presenting to Occupational Therapy due to impaired work and self cares .  The following significant findings have been identified: Impaired strength, Pain, and Post-surgical precautions.  These identified deficits interfere with their ability to perform self care tasks and work tasks as compared to previous level of function.   Patient's limitations or Problem List includes: Pain, Decreased ROM/motion, and Weakness of the left shoulder which interferes with the patient's ability to perform Work Tasks, Household Chores, and Driving  as  compared to previous level of function.    Clinical Decision Making (Complexity):  Assessment of Occupational Performance: 1-3 Performance Deficits  Occupational Performance Limitations: bathing/showering, toileting, dressing, hygiene and grooming, and meal preparation and cleanup  Clinical Decision Making (Complexity): Low complexity    PLAN OF CARE  Treatment Interventions:  Modalities: Cryotherapy, Hot Pack, Ultrasound, Vasoneumatic Device  Interventions: Self-Care/Home Management, Therapeutic Activity, Therapeutic Exercise, Manual Therapy    Long Term Goals          Frequency of Treatment  0-2x/week for 12 weeks  Duration of Treatment:       Recommended Referrals to Other Professionals:   Education Assessment:       Risks and benefits of evaluation/treatment have been explained.   Patient/Family/caregiver agrees with Plan of Care.     Evaluation Time:            Signing Clinician: Manda Knapp OT

## 2024-05-24 ENCOUNTER — THERAPY VISIT (OUTPATIENT)
Dept: OCCUPATIONAL THERAPY | Facility: OTHER | Age: 49
End: 2024-05-24
Attending: ORTHOPAEDIC SURGERY
Payer: COMMERCIAL

## 2024-05-24 DIAGNOSIS — G89.29 CHRONIC RIGHT SHOULDER PAIN: Primary | ICD-10-CM

## 2024-05-24 DIAGNOSIS — M25.511 CHRONIC RIGHT SHOULDER PAIN: Primary | ICD-10-CM

## 2024-05-24 PROCEDURE — 97016 VASOPNEUMATIC DEVICE THERAPY: CPT | Mod: GO | Performed by: OCCUPATIONAL THERAPIST

## 2024-05-24 PROCEDURE — 97110 THERAPEUTIC EXERCISES: CPT | Mod: GO | Performed by: OCCUPATIONAL THERAPIST

## 2024-05-29 ENCOUNTER — THERAPY VISIT (OUTPATIENT)
Dept: OCCUPATIONAL THERAPY | Facility: OTHER | Age: 49
End: 2024-05-29
Attending: ORTHOPAEDIC SURGERY
Payer: COMMERCIAL

## 2024-05-29 DIAGNOSIS — G89.29 CHRONIC RIGHT SHOULDER PAIN: Primary | ICD-10-CM

## 2024-05-29 DIAGNOSIS — M25.511 CHRONIC RIGHT SHOULDER PAIN: Primary | ICD-10-CM

## 2024-05-29 PROCEDURE — 97016 VASOPNEUMATIC DEVICE THERAPY: CPT | Mod: GO | Performed by: OCCUPATIONAL THERAPIST

## 2024-05-29 PROCEDURE — 97110 THERAPEUTIC EXERCISES: CPT | Mod: GO | Performed by: OCCUPATIONAL THERAPIST

## 2024-05-29 PROCEDURE — 97140 MANUAL THERAPY 1/> REGIONS: CPT | Mod: GO | Performed by: OCCUPATIONAL THERAPIST

## 2024-05-31 ENCOUNTER — THERAPY VISIT (OUTPATIENT)
Dept: OCCUPATIONAL THERAPY | Facility: OTHER | Age: 49
End: 2024-05-31
Attending: ORTHOPAEDIC SURGERY
Payer: COMMERCIAL

## 2024-05-31 DIAGNOSIS — M25.511 CHRONIC RIGHT SHOULDER PAIN: Primary | ICD-10-CM

## 2024-05-31 DIAGNOSIS — G89.29 CHRONIC RIGHT SHOULDER PAIN: Primary | ICD-10-CM

## 2024-05-31 PROCEDURE — 97110 THERAPEUTIC EXERCISES: CPT | Mod: GO | Performed by: OCCUPATIONAL THERAPIST

## 2024-06-05 ENCOUNTER — THERAPY VISIT (OUTPATIENT)
Dept: OCCUPATIONAL THERAPY | Facility: OTHER | Age: 49
End: 2024-06-05
Attending: ORTHOPAEDIC SURGERY
Payer: COMMERCIAL

## 2024-06-05 DIAGNOSIS — G89.29 CHRONIC RIGHT SHOULDER PAIN: Primary | ICD-10-CM

## 2024-06-05 DIAGNOSIS — M25.511 CHRONIC RIGHT SHOULDER PAIN: Primary | ICD-10-CM

## 2024-06-05 PROCEDURE — 97140 MANUAL THERAPY 1/> REGIONS: CPT | Mod: GO

## 2024-06-05 PROCEDURE — 97110 THERAPEUTIC EXERCISES: CPT | Mod: GO

## 2024-06-07 ENCOUNTER — THERAPY VISIT (OUTPATIENT)
Dept: OCCUPATIONAL THERAPY | Facility: OTHER | Age: 49
End: 2024-06-07
Attending: ORTHOPAEDIC SURGERY
Payer: COMMERCIAL

## 2024-06-07 ENCOUNTER — THERAPY VISIT (OUTPATIENT)
Dept: PHYSICAL THERAPY | Facility: OTHER | Age: 49
End: 2024-06-07
Attending: UROLOGY
Payer: COMMERCIAL

## 2024-06-07 DIAGNOSIS — G89.29 CHRONIC RIGHT SHOULDER PAIN: Primary | ICD-10-CM

## 2024-06-07 DIAGNOSIS — M25.511 CHRONIC RIGHT SHOULDER PAIN: Primary | ICD-10-CM

## 2024-06-07 DIAGNOSIS — M62.89 PELVIC FLOOR DYSFUNCTION: ICD-10-CM

## 2024-06-07 PROCEDURE — 97140 MANUAL THERAPY 1/> REGIONS: CPT | Mod: GP

## 2024-06-07 PROCEDURE — 97140 MANUAL THERAPY 1/> REGIONS: CPT | Mod: GO

## 2024-06-07 PROCEDURE — 97161 PT EVAL LOW COMPLEX 20 MIN: CPT | Mod: GP

## 2024-06-07 PROCEDURE — 97110 THERAPEUTIC EXERCISES: CPT | Mod: GO

## 2024-06-07 PROCEDURE — 97530 THERAPEUTIC ACTIVITIES: CPT | Mod: GP

## 2024-06-07 NOTE — PROGRESS NOTES
PHYSICAL THERAPY EVALUATION  Type of Visit: Evaluation    See electronic medical record for Abuse and Falls Screening details.    Subjective       Presenting condition or subjective complaint: pelvic floor dysfunction  Date of onset:      Relevant medical history: Anemia; Bladder or bowel problems; Menopause   Dates & types of surgery: hysterectomy, sacrocolpopexy 3/5/2024; R shoudler 5/7/2024    Prior Level of Function  Transfers: Independent  Ambulation: Independent  ADL: Independent    Living Environment  Social support: With family members   Type of home: House   Stairs to enter the home: Yes 2 Is there a railing: Yes   Ramp: Yes   Stairs inside the home: Yes 12 Is there a railing: Yes     Pain assessment: Pain present     Objective      PELVIC EVALUATION  ADDITIONAL HISTORY:  Sex assigned at birth: Female  Gender identity:      Pronouns: She/Her Hers      Bladder History:  Feels bladder filling: Yes  Triggers for feeling of inability to wait to go to the bathroom: Yes hormonal cycle  How long can you wait to urinate: 10-15 minutes (prior to surgery was 5 min)  Gets up at night to urinate: Yes 1  Can stop the flow of urine when urinating: Sometimes  Volume of urine usually released: Average (first void in AM is larger, throughout the day less volume more frequent voiding)   Other issues: Slow or hesitant urine stream; Trouble emptying bladder completely  Number of bladder infections in last 12 months:    Fluid intake per day: 1 bottle of water 4 cups of coffee    Medications taken for bladder: No     Activities causing urine leak: Cough; Sneeze; Hurrying to the bathroom due to a strong urge to urinate (pee); Other activities lifting  Amount of urine typically leaked: small to large volume  Pads used to help with leaking: Yes I use this many pads per day: 1-2 (not every day)      Bowel History:  Frequency of bowel movement: every 1-3 days  Consistency of stool: Soft (varies soft-formed to loose)    Ignores the  urge to defecate: Sometimes  Other bowel issues: Straining to have bowel movement; Pain when pooping; Loss of gas (notes she carries gas-x in her purse)  Length of time spent trying to have a bowel movement: varies from a couple minutes to 10+ min    Sexual Function History:  Sexual orientation: Straight    Sexually active: No (hopeful someday)  Lubrication used:      Pelvic pain: Walking; Standing; Sitting; Certain positions    Pain or difficulty with orgasms/erection/ejaculation: Yes difficulty with orgasm  State of menopause:    Hormone medications: Yes vaginal estrogen    Are you currently pregnant: No, Number of previous pregnancies: 2, Number of deliveries: 2, If you have delivered before, did you have any of these issues during delivery: Episiotomy; Vaginal delivery, Have you been diagnosed with pelvic prolapse or abdominal separation: Yes, Do you have any history of trauma that is relevant to your care that you d like to share: Yes, I d like to discuss it with my provider in person.    Discussed reason for referral regarding pelvic health needs and external/internal pelvic floor muscle examination with patient/guardian.  Opportunity provided to ask questions and verbal consent for assessment and intervention was given.    PAIN: Pain Level at Rest: 2/10  Pain Level with Use: 7/10  Pain Quality: Aching and Burning  Pain Frequency: intermittent or with activity  Pain is Exacerbated By: sitting, walking, lifting, carrying, certain positions, bending, household tasks, work tasks  Pain is Relieved By: otc medications, rest, and changing positions  POSTURE: WFL  LUMBAR SCREEN: AROM WFL  HIP SCREEN:  Strength: WFL     PELVIC/SI SCREEN:  WFL   BREATHING SYMMETRY: Decreased rib cage mobility    PELVIC EXAM  External Visual Inspection:  At rest: Normal  With voluntary pelvic floor contraction: Perineal elevation  Relaxation of PFM: Yes  With intra-abdominal pressure: Bearing down as defecation: Perineal elevation, able  to descend with verbal cues    Integumentary:   Introitus: Unremarkable    External Digital Palpation per Perineum:   Ischiocavernosis: Unremarkable  Bulbo cavernosis: Tightness, Tenderness  Transverse perineal: Tenderness  Levator ani: Tenderness  Perineal body: Unremarkable  Public symphysis: Unremarkable    Scar:   Location/Type: episiotomy scar  Mobility: WNL    Internal Digital Palpation:  Per Vagina:  Tenderness  Digital Muscle Performance: P (Power): 4  E (Endurance): 7  R (Repetitions): 2      Assessment & Plan   CLINICAL IMPRESSIONS  Medical Diagnosis: Pelvic floor dysfunction    Treatment Diagnosis: pelvic floor pain, tightness, weakness   Impression/Assessment: Patient is a 48 year old female with pelvic floor complaints.  The following significant findings have been identified: Pain, Decreased ROM/flexibility, Decreased strength, Impaired sensation, Impaired muscle performance, Decreased activity tolerance, and Instability. These impairments interfere with their ability to perform self care tasks, work tasks, recreational activities, household chores, driving , household mobility, and community mobility as compared to previous level of function.     Clinical Decision Making (Complexity):  Clinical Presentation: Stable/Uncomplicated  Clinical Presentation Rationale: based on medical and personal factors listed in PT evaluation  Clinical Decision Making (Complexity): Low complexity    PLAN OF CARE  Treatment Interventions:  Modalities: Biofeedback, Cryotherapy, E-stim, Hot Pack, Ultrasound  Interventions: Manual Therapy, Neuromuscular Re-education, Therapeutic Activity, Therapeutic Exercise    Long Term Goals     PT Goal 1  Goal Identifier: pain  Goal Description: patient will report 25% reduction of pelvic pain with ADLs including prolonged sitting, ambulation, and stairs  Rationale: to maximize safety and independence with performance of ADLs and functional tasks;to maximize safety and independence  within the community;to maximize safety and independence within the home  Target Date: 07/19/24  PT Goal 2  Goal Identifier: PF strength  Goal Description: patient will demonstrate pelvic floor contraction strength of 4/5, holding for 10 seconds or greater  Rationale: to maximize safety and independence with performance of ADLs and functional tasks;to maximize safety and independence with self cares;to maximize safety and independence within the home;to maximize safety and independence within the community  Target Date: 08/02/24  PT Goal 3  Goal Identifier: continence, urinary frequency  Goal Description: patient will report voiding at 2-4 hour intervals, and leaking <2x daily  Rationale: to maximize safety and independence with performance of ADLs and functional tasks;to maximize safety and independence within the home;to maximize safety and independence within the community;to maximize safety and independence with self cares  Target Date: 08/02/24      Frequency of Treatment: 1-2x/week  Duration of Treatment: 8 weeks    Education Assessment:   Learner/Method: Patient;Listening;Demonstration;No Barriers to Learning    Risks and benefits of evaluation/treatment have been explained.   Patient/Family/caregiver agrees with Plan of Care.     Evaluation Time:        Signing Clinician: Asmita Schafer DPT

## 2024-06-11 ENCOUNTER — THERAPY VISIT (OUTPATIENT)
Dept: OCCUPATIONAL THERAPY | Facility: OTHER | Age: 49
End: 2024-06-11
Attending: ORTHOPAEDIC SURGERY
Payer: COMMERCIAL

## 2024-06-11 DIAGNOSIS — G89.29 CHRONIC RIGHT SHOULDER PAIN: Primary | ICD-10-CM

## 2024-06-11 DIAGNOSIS — M25.511 CHRONIC RIGHT SHOULDER PAIN: Primary | ICD-10-CM

## 2024-06-11 PROCEDURE — 97016 VASOPNEUMATIC DEVICE THERAPY: CPT | Mod: GO | Performed by: OCCUPATIONAL THERAPIST

## 2024-06-11 PROCEDURE — 97110 THERAPEUTIC EXERCISES: CPT | Mod: GO | Performed by: OCCUPATIONAL THERAPIST

## 2024-06-12 ENCOUNTER — THERAPY VISIT (OUTPATIENT)
Dept: PHYSICAL THERAPY | Facility: OTHER | Age: 49
End: 2024-06-12
Attending: UROLOGY
Payer: COMMERCIAL

## 2024-06-12 DIAGNOSIS — M62.89 PELVIC FLOOR DYSFUNCTION: Primary | ICD-10-CM

## 2024-06-12 PROCEDURE — 97530 THERAPEUTIC ACTIVITIES: CPT | Mod: GP

## 2024-06-12 PROCEDURE — 97110 THERAPEUTIC EXERCISES: CPT | Mod: GP

## 2024-06-12 PROCEDURE — 97112 NEUROMUSCULAR REEDUCATION: CPT | Mod: GP

## 2024-06-14 ENCOUNTER — THERAPY VISIT (OUTPATIENT)
Dept: OCCUPATIONAL THERAPY | Facility: OTHER | Age: 49
End: 2024-06-14
Attending: ORTHOPAEDIC SURGERY
Payer: COMMERCIAL

## 2024-06-14 DIAGNOSIS — M25.511 CHRONIC RIGHT SHOULDER PAIN: Primary | ICD-10-CM

## 2024-06-14 DIAGNOSIS — G89.29 CHRONIC RIGHT SHOULDER PAIN: Primary | ICD-10-CM

## 2024-06-14 PROCEDURE — 97016 VASOPNEUMATIC DEVICE THERAPY: CPT | Mod: GO | Performed by: OCCUPATIONAL THERAPIST

## 2024-06-14 PROCEDURE — 97110 THERAPEUTIC EXERCISES: CPT | Mod: GO | Performed by: OCCUPATIONAL THERAPIST

## 2024-06-14 NOTE — PROGRESS NOTES
Bibiana has made significant progress in AROM in shoulder flexion, extension and abduction. She is able to wash her hair and reach the shower head with her dominate right arm. Internal and external rotation are very limited and painful though improving. She is motivated to  participate in therapy and do her HEP. She will continue to benefit form skilled OT to increase ROM and strength.    PLAN  Continue therapy per current plan of care.    Beginning/End Dates of Progress Note Reporting Period:    to 06/14/2024    Referring Provider:  Jefferson Mills         06/14/24 0500   Appointment Info   Treating Provider Manda Knapp OTR/L   Visits Used 10   Medical Diagnosis s/p rt Arthroscopy shoulder  Subacromial Decompression, Distal Clavicle Resection,  labral debridement 5/7 with Gene   OT Tx Diagnosis impaired work and self cares   Precautions/Limitations per protocol   Progress Note/Certification   Onset of Illness/Injury or Date of Surgery 05/07/24   Therapy Frequency 1-2x/week   Predicted Duration 12 weeks   Progress Note Due Date 08/07/24   OT Goal 1   Goal Identifier HEP   Goal Description Pt will be independent in HEP   Rationale In order to maximize safety and independence with performance of self-care activities   Goal Progress Pt is performing HEP as toelrated.   Target Date 06/05/24   OT Goal 2   Goal Identifier shoulder ROM   Goal Description Pt will have 150 degrees of shoulder flexion or greater   Rationale In order to maximize safety and independence with performance of self-care activities   Target Date 08/07/24   Goal Progress 132   OT Goal 3   Goal Identifier shoulder strength   Goal Description Pt will have shoulder strength of 5-/5   Rationale In order to maximize safety and independence with performance of self-care activities   Target Date 08/07/24   Goal Progress progressing see below   Subjective Report   Subjective Report Bibiana reports she was able to vacuum using her arm with minimal pain.   She reports increased range of motion and strength during tasks.   Objective Measure 1   Objective Measure shoulder AROM   Details ROM: flex:  132 abduction: 90 extyernal rotation: 16 Internal rotation 23   Vasopneumatic Device   Vasopneumatic device minutes (23827) 15   Skilled Intervention decrease inflamation and pain.   Vasopneumatic Pressure medium   Duration 15 min   Therapeutic Procedure/Exercise   Therapeutic Procedure: strength, endurance, ROM, flexibillity minutes (37046) 40   Ther Proc 1 Pt performed reciprocal pulleys for AROM and stretch   Ther Proc 1 - Details supine shoulder flexion with 1# to 90 degrees 30x, chest press 30x pro/retraction, sideline: external rotation 20 sleeper stretch .   Skilled Intervention provided cues and assist for techniques, proper exercise. modified exercises as needed.   Patient Response/Progress PT declied pain reports significant tighness with internal adn external rotation.   Manual Therapy   Manual Therapy 1 IASTM with cupping to anterior and lateral deltoids, frame scars.   Skilled Intervention Education   Patient Response/Progress Patient reports she likes the cupping.   Education   Learner/Method Listening;Patient;Pictures/Video;Demonstration;No Barriers to Learning   Plan   Home program Access Code: [email or web address]  URL: [email or web address]  Date: 06/14/2024  Prepared by: Manad Knapp    Exercises  - Supine Shoulder Flexion with Free Weight  - 1 x daily - 7 x weekly - 3 sets - 10 reps  - Supine Scapular Protraction in Flexion with Dumbbells  - 1 x daily - 7 x weekly - 3 sets - 10 reps  - Supine Single Arm Press with Dumbbell  - 1 x daily - 7 x weekly - 3 sets - 10 reps  - Sidelying Shoulder ER with Towel and Dumbbell  - 1 x daily - 7 x weekly - 3 sets - 10 reps   Plan for next session progress strengthening, and ROM. work on keeping shoulder down.   Total Session Time   Timed Code Treatment Minutes 40   Total Treatment Time (sum of timed and untimed  services) 55

## 2024-06-18 ENCOUNTER — THERAPY VISIT (OUTPATIENT)
Dept: OCCUPATIONAL THERAPY | Facility: OTHER | Age: 49
End: 2024-06-18
Attending: ORTHOPAEDIC SURGERY
Payer: COMMERCIAL

## 2024-06-18 DIAGNOSIS — M25.511 CHRONIC RIGHT SHOULDER PAIN: Primary | ICD-10-CM

## 2024-06-18 DIAGNOSIS — G89.29 CHRONIC RIGHT SHOULDER PAIN: Primary | ICD-10-CM

## 2024-06-18 PROCEDURE — 97110 THERAPEUTIC EXERCISES: CPT | Mod: GO | Performed by: OCCUPATIONAL THERAPIST

## 2024-06-18 PROCEDURE — 97016 VASOPNEUMATIC DEVICE THERAPY: CPT | Mod: GO | Performed by: OCCUPATIONAL THERAPIST

## 2024-06-19 ENCOUNTER — OFFICE VISIT (OUTPATIENT)
Dept: ORTHOPEDICS | Facility: OTHER | Age: 49
End: 2024-06-19
Attending: ORTHOPAEDIC SURGERY
Payer: COMMERCIAL

## 2024-06-19 DIAGNOSIS — G89.29 CHRONIC RIGHT SHOULDER PAIN: Primary | ICD-10-CM

## 2024-06-19 DIAGNOSIS — M25.511 CHRONIC RIGHT SHOULDER PAIN: Primary | ICD-10-CM

## 2024-06-19 PROCEDURE — 99024 POSTOP FOLLOW-UP VISIT: CPT | Performed by: ORTHOPAEDIC SURGERY

## 2024-06-19 NOTE — PROGRESS NOTES
SUBJECTIVE:  Patient returns status post right shoulder arthroscopy decompression distal clavicle resection as well as manipulation under anesthesia.  Patient reports range of motion better but not back to where she would like it to be.  Patient is here for a recheck and examination.    ROS: Musculoskeletal and general review of systems are negative, per review of previous clinic questionnaire.  Denies SOB and calf pain.    EXAM: Right shoulder examination shows forward elevation to 120.  Abduction to 120.  Internal rotation to hip level.  Strength slowly but surely improving as well.    IMAGING: None    ASSESSMENT: Right shoulder arthroscopy decompression distal clavicle and manipulation under anesthesia continue PT process.      PLAN: Updated work restrictions.  Anticipate continued slow but steady improvements.  Recheck examination in early August.    Jefferson Mills MD

## 2024-06-21 ENCOUNTER — THERAPY VISIT (OUTPATIENT)
Dept: PHYSICAL THERAPY | Facility: OTHER | Age: 49
End: 2024-06-21
Attending: UROLOGY
Payer: COMMERCIAL

## 2024-06-21 ENCOUNTER — THERAPY VISIT (OUTPATIENT)
Dept: OCCUPATIONAL THERAPY | Facility: OTHER | Age: 49
End: 2024-06-21
Attending: ORTHOPAEDIC SURGERY
Payer: COMMERCIAL

## 2024-06-21 DIAGNOSIS — M25.511 CHRONIC RIGHT SHOULDER PAIN: Primary | ICD-10-CM

## 2024-06-21 DIAGNOSIS — M62.89 PELVIC FLOOR DYSFUNCTION: Primary | ICD-10-CM

## 2024-06-21 DIAGNOSIS — G89.29 CHRONIC RIGHT SHOULDER PAIN: Primary | ICD-10-CM

## 2024-06-21 PROCEDURE — 97112 NEUROMUSCULAR REEDUCATION: CPT | Mod: GP

## 2024-06-21 PROCEDURE — 97110 THERAPEUTIC EXERCISES: CPT | Mod: GP

## 2024-06-21 PROCEDURE — 97110 THERAPEUTIC EXERCISES: CPT | Mod: GO | Performed by: OCCUPATIONAL THERAPIST

## 2024-06-21 PROCEDURE — 97530 THERAPEUTIC ACTIVITIES: CPT | Mod: GP

## 2024-06-25 ENCOUNTER — THERAPY VISIT (OUTPATIENT)
Dept: OCCUPATIONAL THERAPY | Facility: OTHER | Age: 49
End: 2024-06-25
Attending: ORTHOPAEDIC SURGERY
Payer: COMMERCIAL

## 2024-06-25 DIAGNOSIS — G89.29 CHRONIC RIGHT SHOULDER PAIN: Primary | ICD-10-CM

## 2024-06-25 DIAGNOSIS — M25.511 CHRONIC RIGHT SHOULDER PAIN: Primary | ICD-10-CM

## 2024-06-25 PROCEDURE — 97016 VASOPNEUMATIC DEVICE THERAPY: CPT | Mod: GO | Performed by: OCCUPATIONAL THERAPIST

## 2024-06-25 PROCEDURE — 97110 THERAPEUTIC EXERCISES: CPT | Mod: GO | Performed by: OCCUPATIONAL THERAPIST

## 2024-06-27 ENCOUNTER — THERAPY VISIT (OUTPATIENT)
Dept: OCCUPATIONAL THERAPY | Facility: OTHER | Age: 49
End: 2024-06-27
Attending: ORTHOPAEDIC SURGERY
Payer: COMMERCIAL

## 2024-06-27 DIAGNOSIS — G89.29 CHRONIC RIGHT SHOULDER PAIN: Primary | ICD-10-CM

## 2024-06-27 DIAGNOSIS — M25.511 CHRONIC RIGHT SHOULDER PAIN: Primary | ICD-10-CM

## 2024-06-27 PROCEDURE — 97110 THERAPEUTIC EXERCISES: CPT | Mod: GO | Performed by: OCCUPATIONAL THERAPIST

## 2024-06-28 ENCOUNTER — THERAPY VISIT (OUTPATIENT)
Dept: PHYSICAL THERAPY | Facility: OTHER | Age: 49
End: 2024-06-28
Attending: UROLOGY
Payer: COMMERCIAL

## 2024-06-28 DIAGNOSIS — M62.89 PELVIC FLOOR DYSFUNCTION: Primary | ICD-10-CM

## 2024-06-28 PROCEDURE — 97530 THERAPEUTIC ACTIVITIES: CPT | Mod: GP

## 2024-06-28 PROCEDURE — 97110 THERAPEUTIC EXERCISES: CPT | Mod: GP

## 2024-06-28 PROCEDURE — 97112 NEUROMUSCULAR REEDUCATION: CPT | Mod: GP

## 2024-07-03 ENCOUNTER — THERAPY VISIT (OUTPATIENT)
Dept: OCCUPATIONAL THERAPY | Facility: OTHER | Age: 49
End: 2024-07-03
Attending: ORTHOPAEDIC SURGERY
Payer: COMMERCIAL

## 2024-07-03 DIAGNOSIS — G89.29 CHRONIC RIGHT SHOULDER PAIN: Primary | ICD-10-CM

## 2024-07-03 DIAGNOSIS — M25.511 CHRONIC RIGHT SHOULDER PAIN: Primary | ICD-10-CM

## 2024-07-03 PROCEDURE — 97140 MANUAL THERAPY 1/> REGIONS: CPT | Mod: GO

## 2024-07-03 PROCEDURE — 97110 THERAPEUTIC EXERCISES: CPT | Mod: GO

## 2024-07-05 ENCOUNTER — THERAPY VISIT (OUTPATIENT)
Dept: PHYSICAL THERAPY | Facility: OTHER | Age: 49
End: 2024-07-05
Attending: UROLOGY
Payer: COMMERCIAL

## 2024-07-05 DIAGNOSIS — M62.89 PELVIC FLOOR DYSFUNCTION: Primary | ICD-10-CM

## 2024-07-05 PROCEDURE — 97112 NEUROMUSCULAR REEDUCATION: CPT | Mod: GP

## 2024-07-05 PROCEDURE — 97110 THERAPEUTIC EXERCISES: CPT | Mod: GP

## 2024-07-05 PROCEDURE — 97530 THERAPEUTIC ACTIVITIES: CPT | Mod: GP

## 2024-07-08 ENCOUNTER — THERAPY VISIT (OUTPATIENT)
Dept: OCCUPATIONAL THERAPY | Facility: OTHER | Age: 49
End: 2024-07-08
Attending: ORTHOPAEDIC SURGERY
Payer: COMMERCIAL

## 2024-07-08 DIAGNOSIS — M25.511 CHRONIC RIGHT SHOULDER PAIN: Primary | ICD-10-CM

## 2024-07-08 DIAGNOSIS — G89.29 CHRONIC RIGHT SHOULDER PAIN: Primary | ICD-10-CM

## 2024-07-08 PROCEDURE — 97140 MANUAL THERAPY 1/> REGIONS: CPT | Mod: GO

## 2024-07-08 PROCEDURE — 97110 THERAPEUTIC EXERCISES: CPT | Mod: GO

## 2024-07-15 ENCOUNTER — THERAPY VISIT (OUTPATIENT)
Dept: OCCUPATIONAL THERAPY | Facility: OTHER | Age: 49
End: 2024-07-15
Attending: ORTHOPAEDIC SURGERY
Payer: COMMERCIAL

## 2024-07-15 DIAGNOSIS — G89.29 CHRONIC RIGHT SHOULDER PAIN: Primary | ICD-10-CM

## 2024-07-15 DIAGNOSIS — M25.511 CHRONIC RIGHT SHOULDER PAIN: Primary | ICD-10-CM

## 2024-07-15 PROCEDURE — 97110 THERAPEUTIC EXERCISES: CPT | Mod: GO | Performed by: OCCUPATIONAL THERAPIST

## 2024-07-19 ENCOUNTER — THERAPY VISIT (OUTPATIENT)
Dept: PHYSICAL THERAPY | Facility: OTHER | Age: 49
End: 2024-07-19
Attending: UROLOGY
Payer: COMMERCIAL

## 2024-07-19 DIAGNOSIS — M62.89 PELVIC FLOOR DYSFUNCTION: Primary | ICD-10-CM

## 2024-07-19 PROCEDURE — 97112 NEUROMUSCULAR REEDUCATION: CPT | Mod: GP

## 2024-07-19 PROCEDURE — 97110 THERAPEUTIC EXERCISES: CPT | Mod: GP

## 2024-07-19 PROCEDURE — 97530 THERAPEUTIC ACTIVITIES: CPT | Mod: GP

## 2024-08-02 ENCOUNTER — THERAPY VISIT (OUTPATIENT)
Dept: OCCUPATIONAL THERAPY | Facility: OTHER | Age: 49
End: 2024-08-02
Attending: UROLOGY
Payer: COMMERCIAL

## 2024-08-02 ENCOUNTER — THERAPY VISIT (OUTPATIENT)
Dept: PHYSICAL THERAPY | Facility: OTHER | Age: 49
End: 2024-08-02
Attending: UROLOGY
Payer: COMMERCIAL

## 2024-08-02 DIAGNOSIS — M62.89 PELVIC FLOOR DYSFUNCTION: Primary | ICD-10-CM

## 2024-08-02 DIAGNOSIS — M25.511 CHRONIC RIGHT SHOULDER PAIN: Primary | ICD-10-CM

## 2024-08-02 DIAGNOSIS — G89.29 CHRONIC RIGHT SHOULDER PAIN: Primary | ICD-10-CM

## 2024-08-02 PROCEDURE — 97112 NEUROMUSCULAR REEDUCATION: CPT | Mod: GP

## 2024-08-02 PROCEDURE — 97530 THERAPEUTIC ACTIVITIES: CPT | Mod: GP

## 2024-08-02 PROCEDURE — 97140 MANUAL THERAPY 1/> REGIONS: CPT | Mod: GO

## 2024-08-02 PROCEDURE — 97110 THERAPEUTIC EXERCISES: CPT | Mod: GO

## 2024-08-02 PROCEDURE — 97110 THERAPEUTIC EXERCISES: CPT | Mod: GP

## 2024-08-06 ENCOUNTER — THERAPY VISIT (OUTPATIENT)
Dept: OCCUPATIONAL THERAPY | Facility: OTHER | Age: 49
End: 2024-08-06
Attending: ORTHOPAEDIC SURGERY
Payer: COMMERCIAL

## 2024-08-06 DIAGNOSIS — M25.511 CHRONIC RIGHT SHOULDER PAIN: Primary | ICD-10-CM

## 2024-08-06 DIAGNOSIS — G89.29 CHRONIC RIGHT SHOULDER PAIN: Primary | ICD-10-CM

## 2024-08-06 PROCEDURE — 97140 MANUAL THERAPY 1/> REGIONS: CPT | Mod: GO

## 2024-08-06 PROCEDURE — 97110 THERAPEUTIC EXERCISES: CPT | Mod: GO

## 2024-08-16 ENCOUNTER — THERAPY VISIT (OUTPATIENT)
Dept: PHYSICAL THERAPY | Facility: OTHER | Age: 49
End: 2024-08-16
Attending: UROLOGY
Payer: COMMERCIAL

## 2024-08-16 DIAGNOSIS — M62.89 PELVIC FLOOR DYSFUNCTION: Primary | ICD-10-CM

## 2024-08-16 PROCEDURE — 97530 THERAPEUTIC ACTIVITIES: CPT | Mod: GP

## 2024-08-16 PROCEDURE — 97112 NEUROMUSCULAR REEDUCATION: CPT | Mod: GP

## 2024-08-16 PROCEDURE — 97110 THERAPEUTIC EXERCISES: CPT | Mod: GP

## 2024-08-21 ENCOUNTER — THERAPY VISIT (OUTPATIENT)
Dept: PHYSICAL THERAPY | Facility: OTHER | Age: 49
End: 2024-08-21
Attending: UROLOGY
Payer: COMMERCIAL

## 2024-08-21 DIAGNOSIS — M62.89 PELVIC FLOOR DYSFUNCTION: Primary | ICD-10-CM

## 2024-08-21 PROCEDURE — 97110 THERAPEUTIC EXERCISES: CPT | Mod: GP

## 2024-08-21 PROCEDURE — 97530 THERAPEUTIC ACTIVITIES: CPT | Mod: GP

## 2024-08-21 PROCEDURE — 97112 NEUROMUSCULAR REEDUCATION: CPT | Mod: GP

## 2024-08-30 ENCOUNTER — THERAPY VISIT (OUTPATIENT)
Dept: PHYSICAL THERAPY | Facility: OTHER | Age: 49
End: 2024-08-30
Attending: UROLOGY
Payer: COMMERCIAL

## 2024-08-30 DIAGNOSIS — M62.89 PELVIC FLOOR DYSFUNCTION: Primary | ICD-10-CM

## 2024-08-30 PROCEDURE — 97110 THERAPEUTIC EXERCISES: CPT | Mod: GP

## 2024-08-30 PROCEDURE — 97112 NEUROMUSCULAR REEDUCATION: CPT | Mod: GP

## 2024-08-30 PROCEDURE — 97530 THERAPEUTIC ACTIVITIES: CPT | Mod: GP

## 2024-08-30 NOTE — PROGRESS NOTES
08/30/24 0500   Appointment Info   Signing clinician's name / credentials Asmita Schafer DPT   Visits Used 10   Medical Diagnosis Pelvic floor dysfunction   PT Tx Diagnosis pelvic floor pain, tightness, weakness   Progress Note/Certification   Therapy Frequency 1-2x/week   Predicted Duration 8 weeks   Progress Note Completed Date 06/07/24   PT Goal 1   Goal Identifier pain   Goal Description patient will report 25% reduction of pelvic pain with ADLs including prolonged sitting, ambulation, and stairs   Rationale to maximize safety and independence with performance of ADLs and functional tasks;to maximize safety and independence within the community;to maximize safety and independence within the home   Goal Progress patient reports minimal pain with sitting, standing, ambulatio; notes occasional pelvic pressure with stairs   Target Date 07/19/24   Date Met 08/30/24   PT Goal 2   Goal Identifier PF strength   Goal Description patient will demonstrate pelvic floor contraction strength of 4/5, holding for 10 seconds or greater   Rationale to maximize safety and independence with performance of ADLs and functional tasks;to maximize safety and independence with self cares;to maximize safety and independence within the home;to maximize safety and independence within the community   Target Date 08/02/24   Goal Progress continue progressing   PT Goal 3   Goal Identifier continence, urinary frequency   Goal Description patient will report voiding at 2-4 hour intervals, and leaking <2x daily   Rationale to maximize safety and independence with performance of ADLs and functional tasks;to maximize safety and independence within the home;to maximize safety and independence within the community;to maximize safety and independence with self cares   Target Date 08/02/24   Goal Progress continue progressing   Subjective Report   Subjective Report Patient reports increased stressors this week.   Objective Measure 1   Objective  Measure PF contraction   Details 3/7/2   Objective Measure 2   Objective Measure muscle tension, pain   Details adductors L>R, bulbocavernosis   Therapeutic Procedure/Exercise   Therapeutic Procedures: strength, endurance, ROM, flexibility minutes (68742) 15   Ther Proc 1 hip mobility, strength   Ther Proc 1 - Details standing SL balance with hip circumduction, standing pivot ER/IR, standing balance with SL hip flexion from foot on step; cat/cow, low lunge rocking forward/backward, berny pose & with sidebend  (deferred: clamshell, reverse clamshell, s/l hip abd; s/l thoracic rotation; hip ER/IR rocking, piriformis stretch, ITB stretch)   Patient Response/Progress positive   Therapeutic Activity   Therapeutic Activities: dynamic activities to improve functional performance minutes (36636) 10   Ther Act 1 education   Ther Act 1 - Details reviewed TA anatomy & function, relationship with PF as cocontractor; reviewed pelvic floor anatomy, function, layers of muscles, relationship to core and diaphragm   Ther Act 2 breathing   Ther Act 2 - Details instructed in diaphragmatic breathing to facilitate pelvic floor relaxation; pursed lips breathing with forceful exhale to facilitate pelvic floor contraction   Ther Act 3 PF & TA contraction   Ther Act 3 - Details PF & TA contraction seated & with lean forward, sit<>stand, forward step ups on 6in step, quadruped & with alt arm reach, half kneeling  (deferred: supine march, supine progressed with ASLR, split squat, mini squat, standing with reaching wall slides; submax contraction with 15 sec hold in seated with lean forward x4, in standing with march x3; SL running man)   Patient Response/Progress well tolerated, demonstrates good understanding   Neuromuscular Re-education   Neuromuscular re-ed of mvmt, balance, coord, kinesthetic sense, posture, proprioception minutes (33552) 10   Neuro Re-ed 1 proprioception, balance   Neuro Re-ed 1 - Details seated on balance cushion for  tactile feedback of pelvic floor excursion diaphragmatic breathing, PF contraction, pelvic tilts ant/post, lateral, circles, march, LAQ, leg open/close  (deferred: isos with small ball for proprioception HS, glutes, adductors, progressed with adductors + bridge; supine hips on balance cushion march, 90-90 with rotation; supine shoulders on cushion thoracic ext/flex & with rotation)   Patient Response/Progress positive   Education   Learner/Method Patient;Listening;Demonstration;No Barriers to Learning   Plan   Home program HEP, handout given  (xiao qu wu you access code [email or web address])   Plan for next session r/a strength; progress PF contraction in WB   Comments   Pelvic Health Informed Consent Statement Discussed with patient/guardian reason for referral regarding pelvic health needs and external/internal pelvic floor muscle examination.  Opportunity provided to ask questions and verbal consent for assessment and intervention was given.   Total Session Time   Timed Code Treatment Minutes 35   Total Treatment Time (sum of timed and untimed services) 35         PLAN  Continue therapy per current plan of care.    Beginning/End Dates of Progress Note Reporting Period:  06/07/24 to 08/30/2024    Referring Provider:  Ebenezer Kennedy

## 2024-09-06 ENCOUNTER — THERAPY VISIT (OUTPATIENT)
Dept: OCCUPATIONAL THERAPY | Facility: OTHER | Age: 49
End: 2024-09-06
Attending: ORTHOPAEDIC SURGERY
Payer: COMMERCIAL

## 2024-09-06 ENCOUNTER — THERAPY VISIT (OUTPATIENT)
Dept: PHYSICAL THERAPY | Facility: OTHER | Age: 49
End: 2024-09-06
Attending: UROLOGY
Payer: COMMERCIAL

## 2024-09-06 DIAGNOSIS — G89.29 CHRONIC RIGHT SHOULDER PAIN: Primary | ICD-10-CM

## 2024-09-06 DIAGNOSIS — M62.89 PELVIC FLOOR DYSFUNCTION: Primary | ICD-10-CM

## 2024-09-06 DIAGNOSIS — M25.511 CHRONIC RIGHT SHOULDER PAIN: Primary | ICD-10-CM

## 2024-09-06 PROCEDURE — 97110 THERAPEUTIC EXERCISES: CPT | Mod: GP

## 2024-09-06 PROCEDURE — 97112 NEUROMUSCULAR REEDUCATION: CPT | Mod: GP

## 2024-09-06 PROCEDURE — 97110 THERAPEUTIC EXERCISES: CPT | Mod: GO | Performed by: OCCUPATIONAL THERAPIST

## 2024-09-06 PROCEDURE — 97530 THERAPEUTIC ACTIVITIES: CPT | Mod: GP

## 2024-09-06 NOTE — PROGRESS NOTES
DISCHARGE  Reason for Discharge: Patient has met all goals.      Discharge Plan:  Pt will return to Dr. Mills for wrist assessment. She will look at getting PT referral for her headaches that may be related to the neck tension form the shoulder.     Referring Provider:  Jefferson Mills           09/06/24 0500   Appointment Info   Treating Provider Manda Knapp OTR/L   Visits Used 20   Medical Diagnosis s/p rt Arthroscopy shoulder  Subacromial Decompression, Distal Clavicle Resection,  labral debridement 5/7 with Gene   OT Tx Diagnosis impaired work and self cares   Precautions/Limitations per protocol   Progress Note/Certification   Onset of Illness/Injury or Date of Surgery 05/07/24   Therapy Frequency 1-2x/week   Predicted Duration 12 weeks   Progress Note Due Date 08/07/24   OT Goal 1   Goal Identifier HEP   Goal Description Pt will be independent in HEP   Rationale In order to maximize safety and independence with performance of self-care activities   Goal Progress Pt is performing HEP as toelrated.   Target Date 06/05/24   OT Goal 2   Goal Identifier shoulder ROM   Goal Description Pt will have 150 degrees of shoulder flexion or greater   Rationale In order to maximize safety and independence with performance of self-care activities   Goal Progress 156 in supine Goal met   Target Date 08/07/24   OT Goal 3   Goal Identifier shoulder strength   Goal Description Pt will have shoulder strength of 5-/5   Rationale In order to maximize safety and independence with performance of self-care activities   Goal Progress goal met   Target Date 08/07/24   Subjective Report   Subjective Report Bibiana reports she has been working on stretching not as much on strength. Sh eis able to reach overhead into cupboards for plates and glasses. 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 she notices the shoulder getting tighter if she is not consistant with stretching.   Objective Measure 1   Objective Measure shoulder AROM   Details  ROM: flex:  156 abduction: 90 external rotation: 42 Internal rotation   Vasopneumatic Device   Skilled Intervention decrease inflamation and pain.   Vasopneumatic Pressure medium   Duration 15 min   Therapeutic Procedure/Exercise   Therapeutic Procedure: strength, endurance, ROM, flexibillity minutes (67803) 40   Ther Proc 1 measurements taken modified HEP to neccessary exercise. completed high and low ulnar nerve glides   Ther Proc 1 - Details Bibiana had numerous other questions that were answered.   Skilled Intervention provided cues and assist for techniques, proper exercise. modified exercises as needed.   Patient Response/Progress Pt is pleased with improved ROM and strenght. seh notes the shoulder will tighten up with out the stretches.   Manual Therapy   Skilled Intervention Education   Patient Response/Progress Patient reports it feels good and helps loosen things up.   Education   Learner/Method Listening;Patient;Pictures/Video;Demonstration;No Barriers to Learning   Plan   Home program Access Code: [email or web address]  URL: [email or web address]  Date: 06/21/2024  Prepared by: Manda Knapp    Exercises  - Supine Chest Stretch with Elbows Bent  - 1 x daily - 7 x weekly - 3 sets - 10 reps  - Supine Shoulder Flexion PROM  - 1 x daily - 7 x weekly - 3 sets - 10 reps  - Supine Shoulder Cross Body Stretch  - 1 x daily - 7 x weekly - 3 sets - 10 reps  - Sleeper Stretch  - 1 x daily - 7 x weekly - 3 sets - 10 reps  - Shoulder External Rotation with Anchored Resistance  - 1 x daily - 7 x weekly - 3 sets - 10 reps  - Shoulder Internal Rotation with Resistance  - 1 x daily - 7 x weekly - 3 sets - 10 reps  - Sidelying Shoulder Abduction  - 1 x daily - 7 x weekly - 3 sets - 10 reps  - Supine Shoulder Flexion with Free Weight  - 1 x daily - 7 x weekly - 3 sets - 10 reps  - Supine Scapular Protraction in Flexion with Dumbbells  - 1 x daily - 7 x weekly - 3 sets - 10 reps  - Standing Shoulder External Rotation  with Resistance at 45 Degrees of Abduction  - 1 x daily - 7 x weekly - 3 sets - 10 reps  - Standing Single Arm Shoulder Internal Rotation in Abduction with Anchored Resistance  - 1 x daily - 7 x weekly - 3 sets - 10 reps  - Standing Shoulder Abduction with Resistance at Hip  - 1 x daily - 7 x weekly - 3 sets - 10 reps   Plan for next session progress strengthening, and ROM. work on keeping shoulder down.   Total Session Time   Timed Code Treatment Minutes 40   Total Treatment Time (sum of timed and untimed services) 40

## 2024-09-11 ENCOUNTER — THERAPY VISIT (OUTPATIENT)
Dept: PHYSICAL THERAPY | Facility: OTHER | Age: 49
End: 2024-09-11
Attending: UROLOGY
Payer: COMMERCIAL

## 2024-09-11 DIAGNOSIS — M62.89 PELVIC FLOOR DYSFUNCTION: Primary | ICD-10-CM

## 2024-09-11 PROCEDURE — 97112 NEUROMUSCULAR REEDUCATION: CPT | Mod: GP

## 2024-09-11 PROCEDURE — 97530 THERAPEUTIC ACTIVITIES: CPT | Mod: GP

## 2024-09-11 PROCEDURE — 97110 THERAPEUTIC EXERCISES: CPT | Mod: GP

## 2024-09-20 ENCOUNTER — THERAPY VISIT (OUTPATIENT)
Dept: PHYSICAL THERAPY | Facility: OTHER | Age: 49
End: 2024-09-20
Attending: UROLOGY
Payer: COMMERCIAL

## 2024-09-20 DIAGNOSIS — M62.89 PELVIC FLOOR DYSFUNCTION: Primary | ICD-10-CM

## 2024-09-20 PROCEDURE — 97110 THERAPEUTIC EXERCISES: CPT | Mod: GP

## 2024-09-20 PROCEDURE — 97112 NEUROMUSCULAR REEDUCATION: CPT | Mod: GP

## 2024-09-20 PROCEDURE — 97530 THERAPEUTIC ACTIVITIES: CPT | Mod: GP

## 2024-09-21 ENCOUNTER — HEALTH MAINTENANCE LETTER (OUTPATIENT)
Age: 49
End: 2024-09-21

## 2024-09-24 ENCOUNTER — THERAPY VISIT (OUTPATIENT)
Dept: PHYSICAL THERAPY | Facility: OTHER | Age: 49
End: 2024-09-24
Attending: UROLOGY
Payer: COMMERCIAL

## 2024-09-24 DIAGNOSIS — M62.89 PELVIC FLOOR DYSFUNCTION: Primary | ICD-10-CM

## 2024-09-24 PROCEDURE — 97140 MANUAL THERAPY 1/> REGIONS: CPT | Mod: GP

## 2024-09-24 PROCEDURE — 97112 NEUROMUSCULAR REEDUCATION: CPT | Mod: GP

## 2024-09-24 PROCEDURE — 97530 THERAPEUTIC ACTIVITIES: CPT | Mod: GP

## 2024-10-04 ENCOUNTER — THERAPY VISIT (OUTPATIENT)
Dept: PHYSICAL THERAPY | Facility: OTHER | Age: 49
End: 2024-10-04
Attending: UROLOGY
Payer: COMMERCIAL

## 2024-10-04 DIAGNOSIS — M62.89 PELVIC FLOOR DYSFUNCTION: Primary | ICD-10-CM

## 2024-10-04 PROCEDURE — 97110 THERAPEUTIC EXERCISES: CPT | Mod: GP

## 2024-10-04 PROCEDURE — 97112 NEUROMUSCULAR REEDUCATION: CPT | Mod: GP

## 2024-10-04 PROCEDURE — 97530 THERAPEUTIC ACTIVITIES: CPT | Mod: GP

## 2024-10-11 ENCOUNTER — THERAPY VISIT (OUTPATIENT)
Dept: PHYSICAL THERAPY | Facility: OTHER | Age: 49
End: 2024-10-11
Attending: UROLOGY
Payer: COMMERCIAL

## 2024-10-11 DIAGNOSIS — M62.89 PELVIC FLOOR DYSFUNCTION: Primary | ICD-10-CM

## 2024-10-11 PROCEDURE — 97112 NEUROMUSCULAR REEDUCATION: CPT | Mod: GP

## 2024-10-11 PROCEDURE — 97110 THERAPEUTIC EXERCISES: CPT | Mod: GP

## 2024-10-11 PROCEDURE — 97530 THERAPEUTIC ACTIVITIES: CPT | Mod: GP

## 2024-10-16 ENCOUNTER — OFFICE VISIT (OUTPATIENT)
Dept: ORTHOPEDICS | Facility: OTHER | Age: 49
End: 2024-10-16
Attending: ORTHOPAEDIC SURGERY
Payer: COMMERCIAL

## 2024-10-16 DIAGNOSIS — M25.531 RIGHT WRIST PAIN: ICD-10-CM

## 2024-10-16 DIAGNOSIS — M25.512 LEFT SHOULDER PAIN, UNSPECIFIED CHRONICITY: Primary | ICD-10-CM

## 2024-10-16 PROCEDURE — 99213 OFFICE O/P EST LOW 20 MIN: CPT | Performed by: ORTHOPAEDIC SURGERY

## 2024-10-16 NOTE — PROGRESS NOTES
DISCHARGE SUMMARY  To Be Completed 30 Days After the Last Clinical Contact      Thi Bello YOB: 1971 MRN:  [id number]      Date of Intake:  9/4/20 Date of Last Session:  9/24/2020    Chief Complaint:  \"I keep having depression and nightmares.\"    Admission Diagnosis:  Major depressive disorder, single episode, moderate (CMS/HCC)  (primary encounter diagnosis)  Generalized anxiety disorder      Discharge Diagnosis Codes:  Major depressive disorder, single episode, moderate (CMS/HCC)  (primary encounter diagnosis)  Generalized anxiety disorder     The above-named patient was discharged from my care on 2/22/21 for the following reason(s):  Patient has not responded to the provider's engagement effort.    Treatment Provided:  (check all that apply) Individual.    Current Outpatient Medications   Medication Sig Dispense Refill   • Vitamin D, Ergocalciferol, 1.25 mg (50,000 units) capsule Take 1 tablet by mouth twice weekly 24 capsule 0   • DULoxetine HCl 40 MG Capsule Delayed Release Sprinkle Take 40 capsules by mouth daily. 30 capsule 2   • fentaNYL (DURAGESIC) 25 MCG/HR patch Place 1 patch onto the skin every 72 hours. for 30 days 10 patch 0   • traMADol (ULTRAM) 50 MG tablet Take 1 tablet by mouth daily as needed for Pain. for 30 days 30 tablet 0   • Ventolin  (90 Base) MCG/ACT inhaler INHALE 2 PUFFS INTO THE LUNGS EVERY 4 HOURS AS NEEDED FOR SHORTNESS OF BREATH OR WHEEZING 54 g 5   • busPIRone (BUSPAR) 15 MG tablet Take 2 tablets by mouth 2 times daily. 120 tablet 5   • zolpidem (AMBIEN) 5 MG tablet Take 1 tablet by mouth nightly as needed for Sleep. 30 tablet 3   • traZODone (DESYREL) 100 MG tablet Take 1 tablet by mouth nightly. 180 tablet 5   • prazosin (MINIPRESS) 2 MG capsule Take 2 capsules by mouth nightly. 180 capsule 2   • gabapentin (NEURONTIN) 600 MG tablet Please take 4 tablets daily- 2 tablets in the Am and 2 tablets in the pm 360 tablet 1   • ondansetron (ZOFRAN  Surgical Clinic Consult  Primary physician:     Cherie Doe    Chief complaint:   Follow-up right shoulder.  #2 right wrist pain.  #3 left shoulder pain    History of present illness:  This is a 49 year old female I am seeing in consultation for follow-up on her right shoulder arthroscopy decompression distal clavicle resection and manipulation.  Right shoulder continues to improve in terms of flexibility.  Patient has been fine-tuning flexibility and strengthening her most recently.    Patient reports continuation of right wrist pain especially over the ulnar aspect.  Activities that involve usage of the hand and the wrist cause her discomfort.  Patient feels a grinding sensation on occasions.    Patient also reports left shoulder pain very similar to the right side pain with overactivity grinding and noise is noted.  She does have some weakness present as well that she is reporting in addition to that.    Past medical history:   Past Medical History:   Diagnosis Date    SAM II (cervical intraepithelial neoplasia II)     LEEP completed    Gastroesophageal reflux disease     Irritable bowel syndrome without diarrhea     avoids caramel color; gluten free    Personal history of other medical treatment (CODE)     , both vaginal deliveries.  Prolapsed uterus with first delivery.    Stomach ulcer 2019       Pastsurgical history:  Past Surgical History:   Procedure Laterality Date    ARTHROSCOPY SHOULDER DECOMPRESSION Right 2024    Procedure: Arthroscopy shoulder  Subacromial Decompression, Distal Clavicle Resection,  ROTATOR  debridement;  Surgeon: Jefferson Mills MD;  Location: [facility] OR    COLONOSCOPY N/A 2019    F/U  normal    COLONOSCOPY N/A 2024    F/U  normal robert no colitis    DAVINCI HYSTERECTOMY TOTAL, SALPINGECTOMY BILATERAL Bilateral 2024    ROBOT-ASSISTED; Surgeon: Maria Raman MD;  Location: [facility] OR.  Ovaries remain.    DAVINCI SACROCOLPOPEXY, CYSTOSCOPY,  "COMBINED N/A 03/05/2024    Procedure: SACROCOLPOPEXY, ROBOT-ASSISTED, LAPAROSCOPIC, WITH CYSTOSCOPY;  Surgeon: Ebenezer Kennedy MD;  Location: [facility] OR    ENDOSCOPY  12/23/2019    Pyloric ulcer    ESOPHAGOSCOPY, GASTROSCOPY, DUODENOSCOPY (EGD), COMBINED N/A 12/23/2019    Procedure: ESOPHAGOGASTRODUODENOSCOPY, WITH BIOPSY;  Surgeon: Oli Jackson MD;  Location: [facility] OR    EXTRACTION(S) DENTAL      [place] teeth extraction    LEEP TX, CERVICAL  2016    OTHER SURGICAL HISTORY      10/26/2017,[id number],ANKLE SURGERY,ligamentous repair; Dr. Ren       Current medications:  Current Outpatient Medications   Medication Sig Dispense Refill    estradiol (ESTRACE) 0.1 MG/GM vaginal cream Place 2 g vaginally twice a week 40 g 3    famotidine (PEPCID) 20 MG tablet Take 1 tablet (20 mg) by mouth 2 times daily as needed (heartburn) 60 tablet 3    HYDROcodone-acetaminophen (NORCO) 5-325 MG tablet Take 1 tablet by mouth every 4 hours as needed for moderate to severe pain 25 tablet 0    latanoprost (XALATAN) 0.005 % ophthalmic solution Place 1 drop into both eyes daily      metroNIDAZOLE (METROCREAM) 0.75 % external cream Apply topically 2 times daily 45 g 3    ondansetron (ZOFRAN ODT) 4 MG ODT tab Take 1 tablet (4 mg) by mouth every 8 hours as needed for nausea 4 tablet 0       Allergies:  Allergies   Allergen Reactions    Food Diarrhea and GI Disturbance    Gluten Meal GI Disturbance     \"flu like symptoms\"    Lactase-Lactobacillus Fatigue and GI Disturbance    [place] Oil Headache     migraine    Sulfa Antibiotics      Verified in [facility]: Y, Severity in [employer]: S  Other reaction(s): Erythema    Tilactase GI Disturbance     AND fatigue       Family history:  Family History   Problem Relation Age of Onset    Diabetes Mother         Diabetes    Hypertension Mother         Hypertension    Heart Disease Mother         Heart Disease,pacemaker    Other - See Comments Mother         Stoke x 2    Hyperlipidemia Mother         " ODT) 4 MG disintegrating tablet Place 1 tablet onto the tongue every 8 hours as needed for Nausea. 20 tablet 2   • sucralfate (CARAFATE) 1 g tablet Take 1 po qid prn epigastric pain /reflux 30 tablet 1   • atorvastatin (LIPITOR) 80 MG tablet Take 1 tablet by mouth daily. 90 tablet 3   • levothyroxine 50 MCG tablet Take 1 tablet by mouth daily. 90 tablet 1   • dexamethasone (DECADRON) 0.5 MG tablet Take 2 tablets at  11_00 pm the night before the blood draw. 2 tablet 0   • EPINEPHrine (EpiPen 2-Andrés) 0.3 MG/0.3ML auto-injector Inject 0.3 mLs into the muscle 1 time as needed for Anaphylaxis. 2 each 1   • topiramate (TOPAMAX) 100 MG tablet TAKE 1 TABLET BY MOUTH TWICE DAILY 180 tablet 1   • pantoprazole (PROTONIX) 40 MG tablet TAKE 1 TABLET BY MOUTH DAILY 90 tablet 3   • fluticasone (FLONASE) 50 MCG/ACT nasal spray Spray 2 sprays in each nostril daily. 48 g 3   • estradiol (ESTRACE) 2 MG tablet TAKE 1 TABLET BY MOUTH DAILY 30 tablet 11   • meclizine HCl (ANTIVERT) 25 MG tablet Take 1 tablet by mouth every 4 hours as needed for Dizziness. 30 tablet 2   • prednisoLONE acetate (PRED FORTE, OMNIPRED) 1 % ophthalmic suspension 1 drop QID/TID/BID/QD x 4 days each.  Shake well. 5 mL 0   • SUMAtriptan (IMITREX STATDOSE) 6 MG/0.5ML auto-injector INJECT 0.5ML INTO THE SKIN AS NEEDED FOR MIGRAINE 2.5 mL 5   • cetirizine-pseudoephedrine (ZYRTEC-D ALLERGY & CONGESTION) 5-120 MG per tablet Take 1 tablet by mouth 2 times daily.     • cholecalciferol (VITAMIN D3) 1000 UNITS tablet Take 1,000 Units by mouth daily. Take 2 tablets twice daily     • aspirin 325 MG tablet Take 1 tablet by mouth daily. 90 tablet 3     No current facility-administered medications for this visit.        Summary of Patient Progress Toward Goals in the Treatment Plan:  I saw this client for three sessions in September 2020.  The client's treatment goal focused on healthy grieving.  In the last session attended by the client (9/24/20), she reported minimal  Hyperlipidemia    Cerebrovascular Disease Mother     Diabetes Type 2  Mother     Other - See Comments Father         Actinic keratoses    Hypertension Father         Hypertension    Hyperlipidemia Father         Hyperlipidemia    Diabetes Type 2  Father         Diabetes type II    Deep Vein Thrombosis Father     Other - See Comments Sister         Gluten intolerance    Family History Negative Brother         Good Health       Social history:  Social History     Socioeconomic History    Marital status: Single     Spouse name: Not on file    Number of children: Not on file    Years of education: Not on file    Highest education level: Not on file   Occupational History    Not on file   Tobacco Use    Smoking status: Never     Passive exposure: Never    Smokeless tobacco: Never   Vaping Use    Vaping status: Never Used   Substance and Sexual Activity    Alcohol use: Yes     Comment:  rare    Drug use: No    Sexual activity: Not Currently     Partners: Male     Comment: Birth control method: would like to become sexually active soon with boyfriend   Other Topics Concern    Not on file   Social History Narrative     2010, final in 2011.  Two children.    Living with her parents in [place].    Employed with [employer].    Starting LPN training fall 2013, completed.    In relationship - possibly thinking about marriage in 2016.     Social Determinants of Health     Financial Resource Strain: High Risk (2/9/2024)    Financial Resource Strain     Within the past 12 months, have you or your family members you live with been unable to get utilities (heat, electricity) when it was really needed?: Yes   Food Insecurity: Low Risk  (2/9/2024)    Food Insecurity     Within the past 12 months, did you worry that your food would run out before you got money to buy more?: No     Within the past 12 months, did the food you bought just not last and you didn t have money to get more?: No   Transportation Needs: Low Risk   progress in this area.  The client failed a subsequent appointment, and there have been no further scheduled appointments or contact attempts by the client since 2020 (no response to engagement letter mailed 21.)  The client has been on psychotropic medication through Dr. Retana ([facility].)    Discharge Recommendations:  (Include names and addresses of facilities, persons or programs the patient was referred to following discharge.)  Reconvene therapy if the client feels it would be helpful; continue medication as prescribed by Dr. Retana.    Remaining Discharge Needs:  (Include treatment issues not addressed.)  None.      Oskar Infante, LCSW Date:  2021   Time:  10:30 AM          [facility] [place] [facility] [address] [place] [address]      Thi Bello :1971 MRN:[id number]    2020 Time Session Began: 12:03 PM  Time Session Ended: 12:20 PM    Due to COVID-19 precautions, this visit was performed via live interactive two-way Video visit with patient's verbal consent.   Clinician Location:Home.  Patient Location: Home.  Verified patient identity:  [x] Yes    Session Type:30 Minute Therapy (14061)    Others Present: None    Intervention: Behavioral, Cognitive, Supportive, Systemic    Suicide/Homicide/Violence Ideation: No    If Yes, explain: N/A    Current Outpatient Medications   Medication Sig   • traMADol (ULTRAM) 50 MG tablet Take 1 tablet by mouth daily as needed for Pain. for 30 days   • fentaNYL (DURAGESIC) 25 MCG/HR patch Place 1 patch onto the skin every 72 hours. for 30 days   • levothyroxine 50 MCG tablet TAKE 1 TABLET BY MOUTH DAILY   • Ventolin  (90 Base) MCG/ACT inhaler INHALE 2 PUFFS INTO THE LUNGS EVERY 4 HOURS AS NEEDED FOR SHORTNESS OF BREATH OR WHEEZING   • zolpidem (AMBIEN) 5 MG tablet Take 1 tablet by mouth nightly as needed for Sleep.   • prazosin (MINIPRESS) 2  (2/9/2024)    Transportation Needs     Within the past 12 months, has lack of transportation kept you from medical appointments, getting your medicines, non-medical meetings or appointments, work, or from getting things that you need?: No   Physical Activity: Not on file   Stress: Not on file   Social Connections: Not on file   Interpersonal Safety: Low Risk  (5/1/2024)    Interpersonal Safety     Do you feel physically and emotionally safe where you currently live?: Yes     Within the past 12 months, have you been hit, slapped, kicked or otherwise physically hurt by someone?: No     Within the past 12 months, have you been humiliated or emotionally abused in other ways by your partner or ex-partner?: No   Housing Stability: Low Risk  (2/9/2024)    Housing Stability     Do you have housing? : Yes     Are you worried about losing your housing?: No       PROBLEM LIST:  Patient Active Problem List   Diagnosis    Menstrual irregularity    Migraine headache    Right ankle instability    Bilateral sensorineural hearing loss    Chronic right shoulder pain    Pelvic floor dysfunction       Review of Systems:  COMPLETE 12 point REVIEW OF SYSTEMS is otherwise negative with the exception of which is stated above.    Physical exam: [facility] 02/27/2024     General: this is a pleasant female patient in no acute distress.  Patient is awake alert and oriented x3 .   EXAM:  Chest/Respiratory Exam: Normal - Clear to auscultation without rales, rhonchi, or wheezing.  Cardiovascular Exam: normal  Musculoskeletal: Right shoulder examination shows forward elevation to 170 degrees.  Abduction to 170 degrees.  Neuro examination otherwise intact.    Right wrist examination shows pain with wrist extension and ulnar deviation.  Discomfort overlying the TFCC cartilage complex.  Lerma shift testing is otherwise stable.    Left shoulder examination shows significant noise and crepitation as she abducts as well as forward elevates beyond 90 degrees.   MG capsule Take 2 capsules by mouth nightly.   • busPIRone (BUSPAR) 15 MG tablet Take 2 tablets by mouth 2 times daily.   • traZODone (DESYREL) 100 MG tablet Take 1 tablet by mouth nightly.   • DULoxetine HCl 40 MG Cap DR Particles Take 40 mg by mouth daily. Dispense only 30 capsules as pt due for appt   • gabapentin (NEURONTIN) 600 MG tablet Please take 4 tablets daily- 2 tablets in the Am and 2 tablets in the pm   • topiramate (TOPAMAX) 100 MG tablet TAKE 1 TABLET BY MOUTH TWICE DAILY   • methylPREDNISolone (MEDROL DOSEPAK) 4 MG tablet follow package directions   • pantoprazole (PROTONIX) 40 MG tablet TAKE 1 TABLET BY MOUTH DAILY   • dexamethasone (DECADRON) 0.5 MG tablet Take 2 tablets at  11_00 pm the night before the blood draw.   • Vitamin D, Ergocalciferol, 30191 units capsule TAKE 1 CAPSULE BY MOUTH 1 TIME A WEEK   • EPINEPHrine (EPIPEN 2-JAKUB) 0.3 MG/0.3ML auto-injector Inject 0.3 mLs into the muscle 1 time as needed for Anaphylaxis.   • fluticasone (FLONASE) 50 MCG/ACT nasal spray Spray 2 sprays in each nostril daily.   • estradiol (ESTRACE) 2 MG tablet TAKE 1 TABLET BY MOUTH DAILY   • atorvastatin (LIPITOR) 80 MG tablet Take 1 tablet by mouth daily.   • meclizine HCl (ANTIVERT) 25 MG tablet Take 1 tablet by mouth every 4 hours as needed for Dizziness.   • prednisoLONE acetate (PRED FORTE, OMNIPRED) 1 % ophthalmic suspension 1 drop QID/TID/BID/QD x 4 days each.  Shake well.   • SUMAtriptan (IMITREX STATDOSE) 6 MG/0.5ML auto-injector INJECT 0.5ML INTO THE SKIN AS NEEDED FOR MIGRAINE   • ondansetron (ZOFRAN ODT) 4 MG disintegrating tablet Take 1 tablet by mouth every 8 hours as needed for Nausea.   • cetirizine-pseudoephedrine (ZYRTEC-D ALLERGY & CONGESTION) 5-120 MG per tablet Take 1 tablet by mouth 2 times daily.   • cholecalciferol (VITAMIN D3) 1000 UNITS tablet Take 1,000 Units by mouth daily. Take 2 tablets twice daily   • aspirin 325 MG tablet Take 1 tablet by mouth daily.     No current  Decreased strength is present at this time with abduction and external strength testing.  Spurling testing here is otherwise negative.    Imaging: None    Assessment:   #1 right shoulder arthroscopy with decompression and distal clavicle resection.  #2 right wrist pain possible TFCC pathology and cartilage loss.  #3 there is left shoulder pain possible rotator cuff pathology.    Plan:    #1 continue to fine-tune flexibility and strength.  #2 right wrist MRI given chronicity.  #3 left shoulder MRI evaluate for rotator cuff tearing.  Patient be contacted once studies are complete.      Jefferson Mills MD        facility-administered medications for this visit.        Change in Medication(s) Reported: No  If Yes, explain: N/A    Patient/Family Education Provided: Yes  Patient/Family Displays Understanding: Yes    If No, explain: N/A    Chief complaint in patient's own words: \"Not much.\"    Progress Note containing chief complaint and symptoms/problems related to the complaint:    (Data/Action/Response/Plan)    D: The client reports that she is somewhat anxious about picking up her foster daughter's ashes next week.  She states that her children gave her a birthday party recently.  She notes that she does not get much understanding from family members about her grieving process.  She reports that she is not sleeping much due to physical pain, and not eating much.  She states that she has tried a few different pain remedies.  She notes that she is looking into exercise options.  She reports no current hobbies.  A: I recommended that the client respectfully set boundaries with others regarding her grieving.  I recommended that she contact [facility] doctors to see if they can help her further with her pain.  I recommended that she do some research into hobbies, such as a book club.  R: The client was well-engaged in the session and seemed willing to consider my recommendations.  P: I plan to have a video session with the client in two weeks.    Need for Community Resources Assessed: Yes    Resources Needed: Yes    If Yes, what resources: Possible integrative medicine    Primary Diagnosis: Major depressive disorder, single episode, moderate (CMS/HCC)  (primary encounter diagnosis)  Generalized anxiety disorder    Treatment Plan: Unchanged    Discharge Plan: N/A    Next Appointment: 10/8/20 at 11:00 AM  Oskar Infante LCSW

## 2024-10-23 ENCOUNTER — THERAPY VISIT (OUTPATIENT)
Dept: PHYSICAL THERAPY | Facility: OTHER | Age: 49
End: 2024-10-23
Attending: UROLOGY
Payer: COMMERCIAL

## 2024-10-23 DIAGNOSIS — M62.89 PELVIC FLOOR DYSFUNCTION: Primary | ICD-10-CM

## 2024-10-23 PROCEDURE — 97530 THERAPEUTIC ACTIVITIES: CPT | Mod: GP

## 2024-10-23 PROCEDURE — 97110 THERAPEUTIC EXERCISES: CPT | Mod: GP

## 2024-10-23 PROCEDURE — 97112 NEUROMUSCULAR REEDUCATION: CPT | Mod: GP

## 2024-10-25 ENCOUNTER — HOSPITAL ENCOUNTER (OUTPATIENT)
Dept: MRI IMAGING | Facility: OTHER | Age: 49
Discharge: HOME | End: 2024-10-25
Attending: ORTHOPAEDIC SURGERY
Payer: COMMERCIAL

## 2024-10-25 DIAGNOSIS — M25.531 RIGHT WRIST PAIN: ICD-10-CM

## 2024-10-25 DIAGNOSIS — M25.512 LEFT SHOULDER PAIN, UNSPECIFIED CHRONICITY: ICD-10-CM

## 2024-10-25 PROCEDURE — 73221 MRI JOINT UPR EXTREM W/O DYE: CPT | Mod: LT,76

## 2024-10-25 PROCEDURE — 73221 MRI JOINT UPR EXTREM W/O DYE: CPT | Mod: RT

## 2024-11-11 DIAGNOSIS — Z48.89 ENCOUNTER FOR POSTOPERATIVE CARE: ICD-10-CM

## 2024-11-12 RX ORDER — ESTRADIOL 0.1 MG/G
2 CREAM VAGINAL
Qty: 40 G | Refills: 0 | Status: SHIPPED | OUTPATIENT
Start: 2024-11-14

## 2025-04-04 ENCOUNTER — ANCILLARY ORDERS (OUTPATIENT)
Dept: FAMILY MEDICINE | Facility: OTHER | Age: 50
End: 2025-04-04
Payer: COMMERCIAL

## 2025-04-04 DIAGNOSIS — Z12.31 VISIT FOR SCREENING MAMMOGRAM: Primary | ICD-10-CM

## 2025-06-14 SDOH — HEALTH STABILITY: PHYSICAL HEALTH: ON AVERAGE, HOW MANY MINUTES DO YOU ENGAGE IN EXERCISE AT THIS LEVEL?: 0 MIN

## 2025-06-14 SDOH — HEALTH STABILITY: PHYSICAL HEALTH: ON AVERAGE, HOW MANY DAYS PER WEEK DO YOU ENGAGE IN MODERATE TO STRENUOUS EXERCISE (LIKE A BRISK WALK)?: 0 DAYS

## 2025-06-14 ASSESSMENT — ANXIETY QUESTIONNAIRES
2. NOT BEING ABLE TO STOP OR CONTROL WORRYING: SEVERAL DAYS
GAD7 TOTAL SCORE: 11
7. FEELING AFRAID AS IF SOMETHING AWFUL MIGHT HAPPEN: SEVERAL DAYS
3. WORRYING TOO MUCH ABOUT DIFFERENT THINGS: SEVERAL DAYS
5. BEING SO RESTLESS THAT IT IS HARD TO SIT STILL: MORE THAN HALF THE DAYS
GAD7 TOTAL SCORE: 11
8. IF YOU CHECKED OFF ANY PROBLEMS, HOW DIFFICULT HAVE THESE MADE IT FOR YOU TO DO YOUR WORK, TAKE CARE OF THINGS AT HOME, OR GET ALONG WITH OTHER PEOPLE?: SOMEWHAT DIFFICULT
4. TROUBLE RELAXING: NEARLY EVERY DAY
7. FEELING AFRAID AS IF SOMETHING AWFUL MIGHT HAPPEN: SEVERAL DAYS
GAD7 TOTAL SCORE: 11
1. FEELING NERVOUS, ANXIOUS, OR ON EDGE: MORE THAN HALF THE DAYS
IF YOU CHECKED OFF ANY PROBLEMS ON THIS QUESTIONNAIRE, HOW DIFFICULT HAVE THESE PROBLEMS MADE IT FOR YOU TO DO YOUR WORK, TAKE CARE OF THINGS AT HOME, OR GET ALONG WITH OTHER PEOPLE: SOMEWHAT DIFFICULT
6. BECOMING EASILY ANNOYED OR IRRITABLE: SEVERAL DAYS

## 2025-06-14 ASSESSMENT — PAIN SCALES - PAIN ENJOYMENT GENERAL ACTIVITY SCALE (PEG)
AVG_PAIN_PASTWEEK: 2
INTERFERED_GENERAL_ACTIVITY: 3
INTERFERED_ENJOYMENT_LIFE: 3
INTERFERED_ENJOYMENT_LIFE: 3
PEG_TOTALSCORE: 2.67
AVG_PAIN_PASTWEEK: 2
PEG_TOTALSCORE: 2.67
INTERFERED_GENERAL_ACTIVITY: 3

## 2025-06-14 ASSESSMENT — SOCIAL DETERMINANTS OF HEALTH (SDOH): HOW OFTEN DO YOU GET TOGETHER WITH FRIENDS OR RELATIVES?: NEVER

## 2025-06-19 ENCOUNTER — OFFICE VISIT (OUTPATIENT)
Dept: FAMILY MEDICINE | Facility: OTHER | Age: 50
End: 2025-06-19
Attending: PHYSICIAN ASSISTANT
Payer: COMMERCIAL

## 2025-06-19 VITALS
SYSTOLIC BLOOD PRESSURE: 132 MMHG | HEART RATE: 83 BPM | TEMPERATURE: 97.4 F | WEIGHT: 166.2 LBS | DIASTOLIC BLOOD PRESSURE: 71 MMHG | BODY MASS INDEX: 27.66 KG/M2 | OXYGEN SATURATION: 98 %

## 2025-06-19 DIAGNOSIS — Z01.419 PAP TEST, AS PART OF ROUTINE GYNECOLOGICAL EXAMINATION: ICD-10-CM

## 2025-06-19 DIAGNOSIS — Z00.00 ROUTINE GENERAL MEDICAL EXAMINATION AT A HEALTH CARE FACILITY: Primary | ICD-10-CM

## 2025-06-19 DIAGNOSIS — E66.3 OVERWEIGHT (BMI 25.0-29.9): ICD-10-CM

## 2025-06-19 DIAGNOSIS — F32.0 MAJOR DEPRESSIVE DISORDER, SINGLE EPISODE, MILD: ICD-10-CM

## 2025-06-19 RX ORDER — FLUOXETINE 10 MG/1
10 CAPSULE ORAL DAILY
Qty: 90 CAPSULE | Refills: 3 | Status: SHIPPED | OUTPATIENT
Start: 2025-06-19

## 2025-06-19 ASSESSMENT — PATIENT HEALTH QUESTIONNAIRE - PHQ9
SUM OF ALL RESPONSES TO PHQ QUESTIONS 1-9: 8
10. IF YOU CHECKED OFF ANY PROBLEMS, HOW DIFFICULT HAVE THESE PROBLEMS MADE IT FOR YOU TO DO YOUR WORK, TAKE CARE OF THINGS AT HOME, OR GET ALONG WITH OTHER PEOPLE: SOMEWHAT DIFFICULT
SUM OF ALL RESPONSES TO PHQ QUESTIONS 1-9: 8

## 2025-06-19 NOTE — PROGRESS NOTES
"Preventive Care Visit  [facility]  Cherie Doe PA-C, Family Medicine  Jun 19, 2025      Assessment & Plan   Problem List Items Addressed This Visit    None  Visit Diagnoses         Routine general medical examination at a health care facility    -  Primary      Major depressive disorder, single episode, mild        Relevant Medications    FLUoxetine (PROZAC) 10 MG capsule      Overweight (BMI 25.0-29.9)        Relevant Medications    semaglutide-weight management (WEGOVY) 0.25 MG/0.5ML pen      Pap test, as part of routine gynecological examination        Relevant Orders    HPV and Gynecologic Cytology Panel    Gynecologic Cytology (PAP)           Completed Pap and HPV for cervical cancer screening.    Overweight: Discussed medication options at length.  Patient would like to try Wegovy.  Sent to the pharmacy.  Gave side effect profile.  Encouraged continued work on good diet, exercise, and decreasing fat and cholesterol in her diet.  Recheck in a few months for monitoring.    Major depressive disorder, single episode, mild: Will restart fluoxetine at a lower dose 10 mg daily to see if this continues to control her increased depression symptoms along with possible hormonal changes.  Gave side effect profile.  Encourage good diet and exercise.  See a counselor if needed.    Repeat physical in 1 year.    The longitudinal plan of care for the diagnosis(es)/condition(s) as documented were addressed during this visit. Due to the added complexity in care, I will continue to support Bibiana in the subsequent management and with ongoing continuity of care.    Patient has been advised of split billing requirements and indicates understanding: Yes       BMI  Estimated body mass index is 27.66 kg/m  as calculated from the following:    Height as of 12/13/24: 1.651 m (5' 5\").    Weight as of this encounter: 75.4 kg (166 lb 3.2 oz).     Reviewed preventive health counseling, as reflected in patient " instructions  Counseling  Appropriate preventive services were addressed with this patient via screening, questionnaire, or discussion as appropriate for fall prevention, nutrition, physical activity, Tobacco-use cessation, social engagement, weight loss and cognition.  Checklist reviewing preventive services available has been given to the patient.  Reviewed patient's diet, addressing concerns and/or questions.   Patient is at risk for social isolation and has been provided with information about the benefit of social connection.   She is at risk for psychosocial distress and has been provided with information to reduce risk.     Stefany Mccarty is a 49 year old, presenting for the following:  Physical        2025     1:09 PM   Additional Questions   Roomed by Giovana DELCID     Patient's last menstrual period was 2024.   Contraception: Hysterectomy-- Reviewed benign pathology, no cervical dysplasia.  Patient had a LEEP in  so will need pap smear follow-up until .   Risk for STI?: none  Last pap: 2023 - normal pap and HPV, repeat in 5 years  Any hx of abnormal paps:  yes  FH of early CA?: yes  Cholesterol/DM concerns/screenin2024  Tobacco?: none  Lung cancer screening: na  Calcium intake: some  DEXA: none  Last mammo: 2024, repeat scheduled today  Colon cancer screenin2024 - normal colonoscopy, repeat in 10 years  Hepatitis C screen: 2017   HIV screen: 2017   Immunizations: Prevnar 20 and Shingrix vaccine, declines at this time    Patient had surgery for her shoulder and hysterectomy in the past year.  Since then she has noticed some skin changes.  Feeling more hungry.  Skin is not as elastic.  Feels like she has to eat every 2 hours as she is increasingly hungry and will not feel well if she does not eat.  Gaining more weight in the middle.  Mother and father are both diabetic.  Question if it is hormonal related.  Interested in trying a possible weight loss  medication to assist with losing weight.    Not currently taking her fluoxetine 20 mg.  Felt like the medication dose was too much.  Recently feeling increasingly fatigued and tired.  Enjoys her new job better.  Anxiety is improved.  Wondering if a low-dose would help improve the hormonal symptoms along with continuing to assist with her mental health.    Patient notices increased ringing in her ears when she is stressed.    The 10-year ASCVD risk score (Kimo WALKER, et al., 2019) is: 1.4%    Values used to calculate the score:      Age: 49 years      Sex: Female      Is Non- : No      Diabetic: No      Tobacco smoker: No      Systolic Blood Pressure: 132 mmHg      Is BP treated: No      HDL Cholesterol: 60 mg/dL      Total Cholesterol: 230 mg/dL    Advance Care Planning    Discussed advance care planning with patient; however, patient declined at this time.        6/14/2025   General Health   How would you rate your overall physical health? (!) POOR   Feel stress (tense, anxious, or unable to sleep) To some extent   (!) STRESS CONCERN      6/14/2025   Nutrition   Three or more servings of calcium each day? (!) NO   Diet: Gluten-free/reduced    Other   If other, please elaborate: Dairy free   How many servings of fruit and vegetables per day? (!) 0-1   How many sweetened beverages each day? 0-1       Multiple values from one day are sorted in reverse-chronological order         6/14/2025   Exercise   Days per week of moderate/strenous exercise 0 days   Average minutes spent exercising at this level 0 min   (!) EXERCISE CONCERN      6/14/2025   Social Factors   Frequency of gathering with friends or relatives Never   Worry food won't last until get money to buy more No   Food not last or not have enough money for food? No   Do you have housing? (Housing is defined as stable permanent housing and does not include staying outside in a car, in a tent, in an abandoned building, in an overnight  shelter, or couch-surfing.) Yes   Are you worried about losing your housing? No   Lack of transportation? No   Unable to get utilities (heat,electricity)? No   (!) SOCIAL CONNECTIONS CONCERN      6/14/2025   Fall Risk   Fallen 2 or more times in the past year? No   Trouble with walking or balance? No          6/14/2025   Dental   Dentist two times every year? Yes       Today's PHQ-9 Score:       6/19/2025     1:02 PM   PHQ-9 SCORE   PHQ-9 Total Score MyChart 8 (Mild depression)   PHQ-9 Total Score 8        Patient-reported         6/14/2025   Substance Use   Alcohol more than 3/day or more than 7/wk No   Do you use any other substances recreationally? No     Social History     Tobacco Use    Smoking status: Never     Passive exposure: Never    Smokeless tobacco: Never   Vaping Use    Vaping status: Never Used   Substance Use Topics    Alcohol use: Yes     Comment:  rare    Drug use: No           2/9/2024   LAST FHS-7 RESULTS   1st degree relative breast or ovarian cancer No   Any relative bilateral breast cancer No   Any male have breast cancer No   Any ONE woman have BOTH breast AND ovarian cancer Yes   Any woman with breast cancer before 50yrs Yes   2 or more relatives with breast AND/OR ovarian cancer No   2 or more relatives with breast AND/OR bowel cancer Yes        Mammogram Screening - Mammogram every 1-2 years updated in Health Maintenance based on mutual decision making        6/14/2025   STI Screening   New sexual partner(s) since last STI/HIV test? No     History of abnormal Pap smear: Status post hysterectomy. Pap still indicated.         Latest Ref Rng & Units 6/16/2023     2:15 PM 12/10/2020     8:22 AM 1/29/2020     8:05 AM   PAP / HPV   PAP  Negative for Intraepithelial Lesion or Malignancy (NILM)      PAP (Historical)   NIL     HPV 16 DNA Negative Negative  Negative  Negative    HPV 18 DNA Negative Negative  Negative  Negative    Other HR HPV Negative Negative  Positive  Positive      ASCVD Risk    The 10-year ASCVD risk score (Kimo WALKER, et al., 2019) is: 1.4%    Values used to calculate the score:      Age: 49 years      Sex: Female      Is Non- : No      Diabetic: No      Tobacco smoker: No      Systolic Blood Pressure: 132 mmHg      Is BP treated: No      HDL Cholesterol: 60 mg/dL      Total Cholesterol: 230 mg/dL           Reviewed and updated as needed this visit by Provider   Tobacco  Allergies  Meds  Problems  Med Hx  Surg Hx  Fam Hx            Past Medical History:   Diagnosis Date    SAM II (cervical intraepithelial neoplasia II) 2016    LEEP completed    Gastroesophageal reflux disease     Irritable bowel syndrome without diarrhea     avoids caramel color; gluten free    Personal history of other medical treatment (CODE)     , both vaginal deliveries.  Prolapsed uterus with first delivery.    Stomach ulcer 2019     Past Surgical History:   Procedure Laterality Date    ARTHROSCOPY SHOULDER DECOMPRESSION Right 2024    Procedure: Arthroscopy shoulder  Subacromial Decompression, Distal Clavicle Resection,  ROTATOR  debridement;  Surgeon: Jefferson Mills MD;  Location: [facility] OR    COLONOSCOPY N/A 2019    F/U  normal    COLONOSCOPY N/A 2024    F/U  normal robert no colitis    DAVINCI HYSTERECTOMY TOTAL, SALPINGECTOMY BILATERAL Bilateral 2024    ROBOT-ASSISTED; Surgeon: Maria Raman MD;  Location: [facility].  Ovaries remain.    DAVINCI SACROCOLPOPEXY, CYSTOSCOPY, COMBINED N/A 2024    Procedure: SACROCOLPOPEXY, ROBOT-ASSISTED, LAPAROSCOPIC, WITH CYSTOSCOPY;  Surgeon: Ebenezer Kennedy MD;  Location: [facility] OR    ENDOSCOPY  2019    Pyloric ulcer    ESOPHAGOSCOPY, GASTROSCOPY, DUODENOSCOPY (EGD), COMBINED N/A 2019    Procedure: ESOPHAGOGASTRODUODENOSCOPY, WITH BIOPSY;  Surgeon: Oli Jackson MD;  Location: [facility] OR    EXTRACTION(S) DENTAL      [place] teeth extraction    LEEP TX, CERVICAL  2016    OTHER SURGICAL HISTORY       10/26/2017,[id number],ANKLE SURGERY,ligamentous repair; Dr. Ren     OB History    Para Term  AB Living   4 2 2 0 2 2   SAB IAB Ectopic Multiple Live Births   0 0 0 0 2      # Outcome Date GA Lbr Jay/2nd Weight Sex Type Anes PTL Lv   4 Term 2006     Vag-Spont   TRAV   3 AB  6w6d          2 AB            1 Term      Vag-Spont   TRAV     Labs reviewed in EPIC  BP Readings from Last 3 Encounters:   25 132/71   24 132/81   24 105/73    Wt Readings from Last 3 Encounters:   25 75.4 kg (166 lb 3.2 oz)   24 75.3 kg (166 lb)   24 74.4 kg (164 lb)                  Patient Active Problem List   Diagnosis    Menstrual irregularity    Migraine headache    Right ankle instability    Bilateral sensorineural hearing loss    Chronic right shoulder pain    Pelvic floor dysfunction     Past Surgical History:   Procedure Laterality Date    ARTHROSCOPY SHOULDER DECOMPRESSION Right 2024    Procedure: Arthroscopy shoulder  Subacromial Decompression, Distal Clavicle Resection,  ROTATOR  debridement;  Surgeon: Jefferson Mills MD;  Location: [facility]    COLONOSCOPY N/A 2019    F/U  normal    COLONOSCOPY N/A 2024    F/U  normal robert no colitis    DAVINCI HYSTERECTOMY TOTAL, SALPINGECTOMY BILATERAL Bilateral 2024    ROBOT-ASSISTED; Surgeon: Maria Raman MD;  Location: [facility].  Ovaries remain.    DAVINCI SACROCOLPOPEXY, CYSTOSCOPY, COMBINED N/A 2024    Procedure: SACROCOLPOPEXY, ROBOT-ASSISTED, LAPAROSCOPIC, WITH CYSTOSCOPY;  Surgeon: Ebenezer Kennedy MD;  Location: [facility]    ENDOSCOPY  2019    Pyloric ulcer    ESOPHAGOSCOPY, GASTROSCOPY, DUODENOSCOPY (EGD), COMBINED N/A 2019    Procedure: ESOPHAGOGASTRODUODENOSCOPY, WITH BIOPSY;  Surgeon: Oli Jackson MD;  Location: [facility] OR    EXTRACTION(S) DENTAL      [place] teeth extraction    LEEP TX, CERVICAL  2016    OTHER SURGICAL HISTORY      10/26/2017,[id number],ANKLE SURGERY,ligamentous repair;  Dr. Ren       Social History     Tobacco Use    Smoking status: Never     Passive exposure: Never    Smokeless tobacco: Never   Substance Use Topics    Alcohol use: Yes     Comment:  rare     Family History   Problem Relation Age of Onset    Diabetes Mother         Diabetes    Hypertension Mother         Hypertension    Heart Disease Mother         Heart Disease,pacemaker    Other - See Comments Mother         Stoke x 2    Hyperlipidemia Mother         Hyperlipidemia    Cerebrovascular Disease Mother     Diabetes Type 2  Mother     Other - See Comments Father         Actinic keratoses    Hypertension Father         Hypertension    Hyperlipidemia Father         Hyperlipidemia    Diabetes Type 2  Father         Diabetes type II    Deep Vein Thrombosis Father     Other - See Comments Sister         Gluten intolerance    Anxiety Disorder Sister     Family History Negative Brother         Good Health    Anxiety Disorder Child     Depression Child     Anxiety Disorder Child     Depression Child     Depression Paternal Aunt     Breast Cancer Paternal Aunt          Current Outpatient Medications   Medication Sig Dispense Refill    estradiol (ESTRACE) 0.1 MG/GM vaginal cream Place 2 g vaginally twice a week. 40 g 11    famotidine (PEPCID) 20 MG tablet Take 1 tablet (20 mg) by mouth 2 times daily as needed (heartburn). 180 tablet 3    FLUoxetine (PROZAC) 10 MG capsule Take 1 capsule (10 mg) by mouth daily. 90 capsule 3    latanoprost (XALATAN) 0.005 % ophthalmic solution Place 1 drop into both eyes daily      metroNIDAZOLE (METROCREAM) 0.75 % external cream Apply topically 2 times daily. 45 g 11    ondansetron (ZOFRAN ODT) 4 MG ODT tab Take 1 tablet (4 mg) by mouth every 8 hours as needed for nausea. 20 tablet 11    semaglutide-weight management (WEGOVY) 0.25 MG/0.5ML pen Inject 0.5 mLs (0.25 mg) subcutaneously once a week. 2 mL 2     Allergies   Allergen Reactions    Bacid Fatigue and GI Disturbance    Food Diarrhea and  "GI Disturbance    Gluten Meal GI Disturbance     \"flu like symptoms\"    [place] Oil Headache     migraine    Sulfa Antibiotics      [facility]: Y, Severity in [employer]: S  Other reaction(s): Erythema    Tilactase GI Disturbance     AND fatigue     Recent Labs   Lab Test 12/13/24  1025 06/16/23  1434 12/10/20  0832 01/31/20  0907 12/16/19  1216 12/14/18  1025 04/09/18  1644   A1C  --  5.3  --   --   --   --   --    * 108* 118*   < >  --    < > 104*   HDL 60 54 45   < >  --    < > 48   TRIG 179* 152* 155*   < >  --    < > 275*   ALT  --   --  16  --  11  --  16   CR 0.72 0.61 0.67  --  0.80  --  0.84   GFRESTIMATED >90 >90 >90  --  78  --  74   GFRESTBLACK  --   --  >90  --  >90  --  90   POTASSIUM 4.2 4.2 3.6  --  3.8  --  4.2   TSH 1.58  --   --   --   --   --   --     < > = values in this interval not displayed.          Review of Systems  Constitutional, neuro, ENT, endocrine, pulmonary, cardiac, gastrointestinal, genitourinary, musculoskeletal, integument and psychiatric systems are negative, except as otherwise noted.     Objective    Exam  /71   Pulse 83   Temp 97.4  F (36.3  C) (Tympanic)   Wt 75.4 kg (166 lb 3.2 oz)   LMP 02/27/2024   SpO2 98%   BMI 27.66 kg/m     Estimated body mass index is 27.66 kg/m  as calculated from the following:    Height as of 12/13/24: 1.651 m (5' 5\").    Weight as of this encounter: 75.4 kg (166 lb 3.2 oz).    Physical Exam  GENERAL: alert and no distress  EYES: Eyes grossly normal to inspection, PERRL and conjunctivae and sclerae normal  HENT: ear canals and TM's normal, nose and mouth without ulcers or lesions  NECK: no adenopathy, no asymmetry, masses, or scars  RESP: lungs clear to auscultation - no rales, rhonchi or wheezes  BREAST: normal without masses, tenderness or nipple discharge and no palpable axillary masses or adenopathy  CV: regular rate and rhythm, normal S1 S2, no S3 or S4, no murmur, click or rub, no peripheral edema  ABDOMEN: soft, " nontender, no hepatosplenomegaly, no masses and bowel sounds normal   (female) w/bimanual: normal female external genitalia, normal urethral meatus, normal vaginal mucosa, and normal cervix/adnexa/uterus without masses or discharge  Pap completed: Yes  MS: no gross musculoskeletal defects noted, no edema  SKIN: no suspicious lesions or rashes  NEURO: Normal strength and tone, mentation intact and speech normal  PSYCH: mentation appears normal, affect normal/bright        Signed Electronically by: Cherie Doe PA-C    Answers submitted by the patient for this visit:  Patient Health Questionnaire (Submitted on 6/19/2025)  If you checked off any problems, how difficult have these problems made it for you to do your work, take care of things at home, or get along with other people?: Somewhat difficult  PHQ9 TOTAL SCORE: 8  Patient Health Questionnaire (G7) (Submitted on 6/14/2025)  ELEAZAR 7 TOTAL SCORE: 11

## 2025-06-19 NOTE — NURSING NOTE
"Chief Complaint   Patient presents with    Physical     Patient here for physical. She wants to discuss hormonal changes. She also is needing to maybe adjust anxiety medications or get a new one.  Initial /71   Pulse 83   Temp 97.4  F (36.3  C) (Tympanic)   Wt 75.4 kg (166 lb 3.2 oz)   LMP 02/27/2024   SpO2 98%   BMI 27.66 kg/m   Estimated body mass index is 27.66 kg/m  as calculated from the following:    Height as of 12/13/24: 1.651 m (5' 5\").    Weight as of this encounter: 75.4 kg (166 lb 3.2 oz).  Medication Review: complete    The next two questions are to help us understand your food security.  If you are feeling you need any assistance in this area, we have resources available to support you today.          6/14/2025   SDOH- Food Insecurity   Within the past 12 months, did you worry that your food would run out before you got money to buy more? N   Within the past 12 months, did the food you bought just not last and you didn t have money to get more? N         Health Care Directive:  Patient has a Health Care Directive on file      Giovana Arvizu MA      "

## 2025-06-19 NOTE — PATIENT INSTRUCTIONS
Patient Education   Preventive Care Advice   This is general advice given by our system to help you stay healthy. However, your care team may have specific advice just for you. Please talk to your care team about your preventive care needs.  Nutrition  Eat 5 or more servings of fruits and vegetables each day.  Try wheat bread, brown rice and whole grain pasta (instead of white bread, rice, and pasta).  Get enough calcium and vitamin D. Check the label on foods and aim for 100% of the RDA (recommended daily allowance).  Lifestyle  Exercise at least 150 minutes each week  (30 minutes a day, 5 days a week).  Do muscle strengthening activities 2 days a week. These help control your weight and prevent disease.  No smoking.  Wear sunscreen to prevent skin cancer.  Have a dental exam and cleaning every 6 months.  Yearly exams  See your health care team every year to talk about:  Any changes in your health.  Any medicines your care team has prescribed.  Preventive care, family planning, and ways to prevent chronic diseases.  Shots (vaccines)   HPV shots (up to age 26), if you've never had them before.  Hepatitis B shots (up to age 59), if you've never had them before.  COVID-19 shot: Get this shot when it's due.  Flu shot: Get a flu shot every year.  Tetanus shot: Get a tetanus shot every 10 years.  Pneumococcal, hepatitis A, and RSV shots: Ask your care team if you need these based on your risk.  Shingles shot (for age 50 and up)  General health tests  Diabetes screening:  Starting at age 35, Get screened for diabetes at least every 3 years.  If you are younger than age 35, ask your care team if you should be screened for diabetes.  Cholesterol test: At age 39, start having a cholesterol test every 5 years, or more often if advised.  Bone density scan (DEXA): At age 50, ask your care team if you should have this scan for osteoporosis (brittle bones).  Hepatitis C: Get tested at least once in your life.  STIs (sexually  transmitted infections)  Before age 24: Ask your care team if you should be screened for STIs.  After age 24: Get screened for STIs if you're at risk. You are at risk for STIs (including HIV) if:  You are sexually active with more than one person.  You don't use condoms every time.  You or a partner was diagnosed with a sexually transmitted infection.  If you are at risk for HIV, ask about PrEP medicine to prevent HIV.  Get tested for HIV at least once in your life, whether you are at risk for HIV or not.  Cancer screening tests  Cervical cancer screening: If you have a cervix, begin getting regular cervical cancer screening tests starting at age 21.  Breast cancer scan (mammogram): If you've ever had breasts, begin having regular mammograms starting at age 40. This is a scan to check for breast cancer.  Colon cancer screening: It is important to start screening for colon cancer at age 45.  Have a colonoscopy test every 10 years (or more often if you're at risk) Or, ask your provider about stool tests like a FIT test every year or Cologuard test every 3 years.  To learn more about your testing options, visit:   .  For help making a decision, visit:   https://bit.ly/ff61805.  Prostate cancer screening test: If you have a prostate, ask your care team if a prostate cancer screening test (PSA) at age 55 is right for you.  Lung cancer screening: If you are a current or former smoker ages 50 to 80, ask your care team if ongoing lung cancer screenings are right for you.  For informational purposes only. Not to replace the advice of your health care provider. Copyright   2023 [facility]. All rights reserved. Clinically reviewed by the [facility] Transitions Program. [employer] 574027 - REV 01/24.  Relationships for Good Health  Relationships are important for our health and happiness. Social isolation, loneliness and lack of support are bad for your health. Studies show that loneliness can harm health  and limit your life span as much as high blood pressure and smoking.   Take some time to reflect on your relationships. Then answer these questions:  Are there people in your life that cause you stress or drain your energy? What can you do to set limits?  ________________________________________________________________________________________________________________________________________________________________________________________________________________________________________________________________________________________________________________________________________________  Who do you enjoy spending time with? Who can you go to for support?  ________________________________________________________________________________________________________________________________________________________________________________________________________________________________________________________________________________________________________________________________________________  What can you do to improve your relationships with others?  __________________________________________________________________________________________________________________________________________________________________________________________________________________  ______________________________________________________________________________________________________________________________  What do you like most about your relationships with others?  ________________________________________________________________________________________________________________________________________________________________________________________________________________________________________________________________________________________________________________________________________________  My goal: ______________________________________________________________________  I will:  ______________________________________________________________________________________________________________________________________________________________________________________________    For informational purposes only. Not to replace the advice of your health care provider. Copyright   2018 [facility]. All rights reserved. Clinically reviewed by [facility]  Team. [employer] 933128 - Rev 06/24.  Learning About Stress  What is stress?     Stress is your body's response to a hard situation. Your body can have a physical, emotional, or mental response. Stress is a fact of life for most people, and it affects everyone differently. What causes stress for you may not be stressful for someone else.  A lot of things can cause stress. You may feel stress when you go on a job interview, take a test, or run a race. This kind of short-term stress is normal and even useful. It can help you if you need to work hard or react quickly. For example, stress can help you finish an important job on time.  Long-term stress is caused by ongoing stressful situations or events. Examples of long-term stress include long-term health problems, ongoing problems at work, or conflicts in your family. Long-term stress can harm your health.  How does stress affect your health?  When you are stressed, your body responds as though you are in danger. It makes hormones that speed up your heart, make you breathe faster, and give you a burst of energy. This is called the fight-or-flight stress response. If the stress is over quickly, your body goes back to normal and no harm is done.  But if stress happens too often or lasts too long, it can have bad effects. Long-term stress can make you more likely to get sick, and it can make symptoms of some diseases worse. If you tense up when you are stressed, you may develop neck, shoulder, or low back pain. Stress is linked to high blood pressure and heart disease.  Stress also  harms your emotional health. It can make you raygoza, tense, or depressed. Your relationships may suffer, and you may not do well at work or school.  What can you do to manage stress?  You can try these things to help manage stress:   Do something active. Exercise or activity can help reduce stress. Walking is a great way to get started. Even everyday activities such as housecleaning or yard work can help.  Try yoga or pepito chi. These techniques combine exercise and meditation. You may need some training at first to learn them.  Do something you enjoy. For example, listen to music or go to a movie. Practice your hobby or do volunteer work.  Meditate. This can help you relax, because you are not worrying about what happened before or what may happen in the future.  Do guided imagery. Imagine yourself in any setting that helps you feel calm. You can use online videos, books, or a teacher to guide you.  Do breathing exercises. For example:  From a standing position, bend forward from the waist with your knees slightly bent. Let your arms dangle close to the floor.  Breathe in slowly and deeply as you return to a standing position. Roll up slowly and lift your head last.  Hold your breath for just a few seconds in the standing position.  Breathe out slowly and bend forward from the waist.  Let your feelings out. Talk, laugh, cry, and express anger when you need to. Talking with supportive friends or family, a counselor, or a yesika leader about your feelings is a healthy way to relieve stress. Avoid discussing your feelings with people who make you feel worse.  Write. It may help to write about things that are bothering you. This helps you find out how much stress you feel and what is causing it. When you know this, you can find better ways to cope.  What can you do to prevent stress?  You might try some of these things to help prevent stress:  Manage your time. This helps you find time to do the things you want and need to  "do.  Get enough sleep. Your body recovers from the stresses of the day while you are sleeping.  Get support. Your family, friends, and community can make a difference in how you experience stress.  Limit your news feed. Avoid or limit time on social media or news that may make you feel stressed.  Do something active. Exercise or activity can help reduce stress. Walking is a great way to get started.  Where can you learn more?  Go to https://www.[employer].net/patiented  Enter N032 in the search box to learn more about \"Learning About Stress.\"  Current as of: October 24, 2024  Content Version: 14.5    8827-1135 [employer].   Care instructions adapted under license by your healthcare professional. If you have questions about a medical condition or this instruction, always ask your healthcare professional. [employer] disclaims any warranty or liability for your use of this information.    Recovering From Depression: Care Instructions  Overview    Sticking to your treatment plan is important as you recover from depression. It may take time for your symptoms to get better after you start treatment. Try not to give up if you don't feel better right away. Make sure you keep going to counseling and taking any prescribed medicine if they are part of your treatment plan.  Focus on things that can help you feel better, such as being with friends and family. Try to eat healthy foods, be active, and get enough sleep. Take things slowly as you begin to recover.  Follow-up care is a key part of your treatment and safety. Be sure to make and go to all appointments, and call your doctor if you are having problems. It's also a good idea to know your test results and keep a list of the medicines you take.  How can you care for yourself at home?  Be realistic  If you have a large task to do, break it up into smaller steps you can handle, and just do what you can.  You may want to put off important decisions " until your depression has lifted. If you have plans that will have a major impact on your life, such as marriage, divorce, or a job change, try to wait a bit. Talk it over with friends and loved ones who can help you look at the overall picture first.  Reaching out to people for help is important. Do not isolate yourself. Let your family and friends help you. Find someone you can trust and confide in, and talk to that person.  Be patient, and be kind to yourself. Remember that depression is not your fault and is not something you can overcome with willpower alone. Treatment is important for depression, just like for any other illness. Feeling better takes time, and your mood will improve little by little.  Stay active  Stay busy and get outside. Take a walk, or try some other light exercise.  Talk with your doctor about an exercise program. Exercise can help with mild depression.  Go to a movie or concert. Take part in a Zoroastrian activity or other social gathering. Go to a ball game.  Ask a friend to have dinner with you.  Take care of yourself  Eat healthy foods such as fresh fruits and vegetables, whole grains, and lean protein. If you have lost your appetite, eat small snacks rather than large meals.  Avoid using marijuana and other drugs and drinking alcohol. Do not take medicines that have not been prescribed for you. They may interfere with medicines you may be taking for depression, or they may make your depression worse.  Take your medicines exactly as they are prescribed. You may start to feel better within 1 to 3 weeks of taking antidepressant medicine. But it can take as many as 6 to 8 weeks to see more improvement. If you have questions or concerns about your medicines, or if you do not notice any improvement by 3 weeks, talk to your doctor.  Continue to take your medicine after your symptoms improve. Taking your medicine for at least 6 months after you feel better can help keep you from getting depressed  again. If this isn't the first time you have been depressed, your doctor may recommend you to take medicine even longer.  If you have any side effects from your medicine, tell your doctor. Many side effects are mild and will go away on their own after you have been taking the medicine for a few weeks. Some may last longer. Talk to your doctor if side effects are bothering you too much. You might be able to try a different medicine.  Continue counseling. It may help prevent depression from returning, especially if you've had multiple episodes of depression. Talk with your counselor if you are having a hard time attending your sessions or you think the sessions aren't working. Don't just stop going.  Get enough sleep. Talk to your doctor if you are having problems sleeping.  Avoid sleeping pills unless they are prescribed by the doctor treating your depression. Sleeping pills may make you groggy during the day, and they may interact with other medicine you are taking.  If you have any other illnesses, such as diabetes, heart disease, or high blood pressure, make sure to continue with your treatment. Tell your doctor about all of the medicines you take, including those with or without a prescription.  Where to get help 24 hours a day, 7 days a week  If you or someone you know talks about suicide, self-harm, a mental health crisis, a substance use crisis, or any other kind of emotional distress, get help right away. You can:  Call the Suicide and Crisis Lifeline at 347.  Text HOME to 082081 to access the Crisis Text Line.  Consider saving these numbers in your phone.  Go to [employer]org for more information or to chat online.  Call 971 anytime you think you may need emergency care. For example, call if:  You feel like hurting yourself or someone else.  Someone you know has depression and is about to attempt or is attempting suicide.  Where to get help 24 hours a day, 7 days a week  If you or someone you know talks  "about suicide, self-harm, a mental health crisis, a substance use crisis, or any other kind of emotional distress, get help right away. You can:  Call the Suicide and Crisis Lifeline at 988.  Text HOME to 161133 to access the Crisis Text Line.  Consider saving these numbers in your phone.  Go to [employer] for more information or to chat online.  Call your doctor now or seek immediate medical care if:  You hear voices.  Someone you know has depression and:  Starts to give away possessions.  Uses illegal drugs or drinks alcohol heavily.  Talks or writes about death, including writing suicide notes or talking about guns, knives, or pills.  Starts to spend a lot of time alone.  Acts very aggressively or suddenly appears calm.  Watch closely for changes in your health, and be sure to contact your doctor if:  You do not get better as expected.  Where can you learn more?  Go to https://www.[employer].net/patiented  Enter N529 in the search box to learn more about \"Recovering From Depression: Care Instructions.\"  Current as of: July 31, 2024  Content Version: 14.5    3364-8530 [employer].   Care instructions adapted under license by your healthcare professional. If you have questions about a medical condition or this instruction, always ask your healthcare professional. [employer] disclaims any warranty or liability for your use of this information.       "

## 2025-06-24 LAB
HPV HR 12 DNA CVX QL NAA+PROBE: NEGATIVE
HPV16 DNA CVX QL NAA+PROBE: NEGATIVE
HPV18 DNA CVX QL NAA+PROBE: NEGATIVE
HUMAN PAPILLOMA VIRUS FINAL DIAGNOSIS: NORMAL

## 2025-06-25 ENCOUNTER — RESULTS FOLLOW-UP (OUTPATIENT)
Dept: FAMILY MEDICINE | Facility: OTHER | Age: 50
End: 2025-06-25

## 2025-06-30 ENCOUNTER — TELEPHONE (OUTPATIENT)
Dept: FAMILY MEDICINE | Facility: OTHER | Age: 50
End: 2025-06-30
Payer: COMMERCIAL

## 2025-06-30 LAB
BKR AP ASSOCIATED HPV REPORT: NORMAL
BKR LAB AP GYN ADEQUACY: NORMAL
BKR LAB AP GYN INTERPRETATION: NORMAL
BKR LAB AP PREVIOUS ABNL DX: NORMAL
BKR LAB AP PREVIOUS ABNORMAL: NORMAL
PATH REPORT.COMMENTS IMP SPEC: NORMAL
PATH REPORT.COMMENTS IMP SPEC: NORMAL
PATH REPORT.RELEVANT HX SPEC: NORMAL

## 2025-06-30 NOTE — TELEPHONE ENCOUNTER
Sent prior authorization for Wegovy. Message from [employer]: Drug is not covered by eric [employer]uga on 6/30/2025 at 4:28 PM

## 2025-07-24 ENCOUNTER — MYC MEDICAL ADVICE (OUTPATIENT)
Dept: FAMILY MEDICINE | Facility: OTHER | Age: 50
End: 2025-07-24
Payer: COMMERCIAL

## 2025-07-28 ENCOUNTER — OFFICE VISIT (OUTPATIENT)
Dept: FAMILY MEDICINE | Facility: OTHER | Age: 50
End: 2025-07-28
Attending: PHYSICIAN ASSISTANT
Payer: COMMERCIAL

## 2025-07-28 VITALS
BODY MASS INDEX: 28.09 KG/M2 | WEIGHT: 168.8 LBS | OXYGEN SATURATION: 98 % | TEMPERATURE: 98.1 F | HEART RATE: 86 BPM | SYSTOLIC BLOOD PRESSURE: 125 MMHG | DIASTOLIC BLOOD PRESSURE: 72 MMHG

## 2025-07-28 DIAGNOSIS — E66.3 OVERWEIGHT (BMI 25.0-29.9): ICD-10-CM

## 2025-07-28 DIAGNOSIS — L98.9 SKIN LESION: ICD-10-CM

## 2025-07-28 DIAGNOSIS — L65.9 ALOPECIA: Primary | ICD-10-CM

## 2025-07-28 PROBLEM — F32.0 MAJOR DEPRESSIVE DISORDER, SINGLE EPISODE, MILD: Status: ACTIVE | Noted: 2025-07-28

## 2025-07-28 RX ORDER — SPIRONOLACTONE 50 MG/1
50 TABLET, FILM COATED ORAL DAILY
Qty: 90 TABLET | Refills: 0 | Status: SHIPPED | OUTPATIENT
Start: 2025-07-28

## 2025-07-28 ASSESSMENT — PATIENT HEALTH QUESTIONNAIRE - PHQ9
10. IF YOU CHECKED OFF ANY PROBLEMS, HOW DIFFICULT HAVE THESE PROBLEMS MADE IT FOR YOU TO DO YOUR WORK, TAKE CARE OF THINGS AT HOME, OR GET ALONG WITH OTHER PEOPLE: SOMEWHAT DIFFICULT
SUM OF ALL RESPONSES TO PHQ QUESTIONS 1-9: 5
SUM OF ALL RESPONSES TO PHQ QUESTIONS 1-9: 5

## 2025-07-28 NOTE — NURSING NOTE
"Chief Complaint   Patient presents with    Medication Request    Hair Loss     Patients friend is taking spironolactone for hair loss and she would like to try it. Also the weygovy that was prescribed isnt covered by insurance, but Mounjaro and ozempic are.   Initial /72   Pulse 86   Temp 98.1  F (36.7  C) (Tympanic)   Wt 76.6 kg (168 lb 12.8 oz)   LMP 02/27/2024   SpO2 98%   BMI 28.09 kg/m   Estimated body mass index is 28.09 kg/m  as calculated from the following:    Height as of 12/13/24: 1.651 m (5' 5\").    Weight as of this encounter: 76.6 kg (168 lb 12.8 oz).  Medication Review: complete    The next two questions are to help us understand your food security.  If you are feeling you need any assistance in this area, we have resources available to support you today.          6/14/2025   SDOH- Food Insecurity   Within the past 12 months, did you worry that your food would run out before you got money to buy more? N   Within the past 12 months, did the food you bought just not last and you didn t have money to get more? N         Health Care Directive:  Patient has a Health Care Directive on file      Giovana Arvizu MA      "

## 2025-07-28 NOTE — PROGRESS NOTES
Assessment & Plan   Problem List Items Addressed This Visit    None  Visit Diagnoses         Alopecia    -  Primary    Relevant Medications    spironolactone (ALDACTONE) 50 MG tablet    Other Relevant Orders    Basic Metabolic Panel    Adult Dermatology  Referral      Overweight (BMI 25.0-29.9)        Relevant Medications    semaglutide (OZEMPIC) 2 MG/3ML pen      Skin lesion        Relevant Orders    Adult Dermatology  Referral           Skin lesion: Referred to dermatology for consult.    Alopecia: Patient was started on spironolactone 50 mg daily.  Will complete a BMP with a lab only appointment in 1 to 2 weeks.  Recheck in 3 months in office with repeat labs.  Also placed referral to dermatology for consult.    Overweight: Sent Ozempic to the pharmacy.  Gave side effect profile.  Encourage good diet and exercise.  Losing weight will help with reducing depression concerns.      The longitudinal plan of care for the diagnosis(es)/condition(s) as documented were addressed during this visit. Due to the added complexity in care, I will continue to support Bibiana in the subsequent management and with ongoing continuity of care.    Subjective   Bibiana is a 49 year old, presenting for the following health issues:  Medication Request and Hair Loss        7/28/2025     1:51 PM   Additional Questions   Roomed by Giovana ANAYA CMA     History of Present Illness       Reason for visit:  Hair loss medication, mole removal, review of wt. loss medication options.  Symptom onset:  More than a month  Symptoms include:  More than normal hair loss, increased appetite more than normal, moles on face  Symptom intensity:  Severe  Symptom progression:  Worsening  Had these symptoms before:  Yes  Has tried/received treatment for these symptoms:  No  What makes it worse:  Stress  What makes it better:  Rest She is missing 2 dose(s) of medications per week.  She is not taking prescribed medications regularly due to  remembering to take.      Patient is coming today with a few concerns.  Has had hair loss.  Mother has history of hair loss.  Genetic.  Has tried shampoos and Rogaine type of medication.  Unfortunately it does not help.  Stressful job.  Wondering what she can use for treatment.  Questioning about starting spironolactone.    Has 2 skin lesions above her lips.  Skin colored.  Interested in having them removed.    Patient has been trying to lose weight.  Reports that Wegovy was covered under turns.  Questioning if Ozempic would be covered.    Review of Systems  Constitutional, HEENT, cardiovascular, pulmonary, gi and gu systems are negative, except as otherwise noted.      Objective    /72   Pulse 86   Temp 98.1  F (36.7  C) (Tympanic)   Wt 76.6 kg (168 lb 12.8 oz)   LMP 02/27/2024   SpO2 98%   BMI 28.09 kg/m    Body mass index is 28.09 kg/m .  Physical Exam  Vitals and nursing note reviewed.   Constitutional:       Appearance: Normal appearance.   HENT:      Head: Normocephalic and atraumatic.   Musculoskeletal:         General: Normal range of motion.      Cervical back: Normal range of motion.   Skin:     General: Skin is warm and dry.      Findings: No rash.      Comments: Seen here appreciated on the top of the head.  2 skin colored raised papules appreciated above the lips approximately 3 x 3 mm in diameter.   Neurological:      General: No focal deficit present.      Mental Status: She is alert and oriented to person, place, and time.   Psychiatric:         Mood and Affect: Mood normal.         Behavior: Behavior normal.            No results found for any visits on 07/28/25.        Signed Electronically by: Cherie Doe PA-C

## (undated) DEVICE — PAD PERI INDIV WRAP 11" 2022A

## (undated) DEVICE — ANTIFOG SOLUTION SEE SHARP 150M TROCAR SWABS 30978

## (undated) DEVICE — TUBING IRRIG CYSTO/BLADDER SET 81" LF 2C4040

## (undated) DEVICE — SUCTION MANIFOLD NEPTUNE 2 SYS 4 PORT 0702-020-000

## (undated) DEVICE — ENDO FORCEP ENDOJAW BIOPSY 2.8MMX230CM FB-220U

## (undated) DEVICE — SU WND CLOSURE VLOC 180 ABS 2-0 12" GS-21 VLOCL0315

## (undated) DEVICE — DAVINCI XI ESU FORCE BIPOLAR 8MM 471405

## (undated) DEVICE — Device

## (undated) DEVICE — BUR ARTHREX COOLCUT OVAL 8 FLUTE 4.0MMX13CM AR-8400OBE

## (undated) DEVICE — DRSG GAUZE 4X4" TRAY 6939

## (undated) DEVICE — TUBING ARTHROSCOPY PUMP ARTHREX AR-6410

## (undated) DEVICE — SYR 50ML LL W/O NDL 309653

## (undated) DEVICE — DAVINCI XI MONOPOLAR SCISSORS HOT SHEARS 8MM 470179

## (undated) DEVICE — DAVINCI XI SEAL UNIVERSAL 5-8MM 470361

## (undated) DEVICE — SOL NACL 0.9% INJ 1000ML BAG 2B1324X

## (undated) DEVICE — SOL WATER 1500ML

## (undated) DEVICE — LIGHT HANDLE X2

## (undated) DEVICE — ENDO KIT COMPLIANCE DYKENDOCMPLY

## (undated) DEVICE — DRSG ABDOMINAL PAD UNSTERILE 5X9" 9190

## (undated) DEVICE — LINEN ORTHO PACK 5446

## (undated) DEVICE — PAD FLOOR SURGISAFE 46X40" 84610

## (undated) DEVICE — SU ETHILON 3-0 PS-2 18" 1669H

## (undated) DEVICE — DAVINCI XI GRASPER FENESTRATED TIP UP 8MM 470347

## (undated) DEVICE — DRAPE SLEEVE ARTHREX LATERAL TRACTION AR-1635

## (undated) DEVICE — PACK SHOULDER ARTHROSCOPY SOP15SAFCA

## (undated) DEVICE — SUCTION IRR STRYKERFLOW II W/TIP 250-070-520

## (undated) DEVICE — LINEN GOWN X4 5410

## (undated) DEVICE — SYR PISTON URETHRAL 60ML 68000

## (undated) DEVICE — GLOVE BIOGEL INDICATOR 7.5 LF 41675

## (undated) DEVICE — DAVINCI XI DRIVER NDL MEGA SUTURECUT 8MM EXT 471309

## (undated) DEVICE — DAVINCI XI OBTURATOR BLADELESS 8MM 470359

## (undated) DEVICE — LINEN TOWEL PACK X5 5464

## (undated) DEVICE — ENDO TROCAR CONMED AIRSEAL BLADELESS 08X120MM IAS8-120LP

## (undated) DEVICE — ABLATOR ARTHREX APOLLO RF MP90 ASPIRATING 90DEG AR-9811

## (undated) DEVICE — GLOVE BIOGEL PI MICRO INDICATOR UNDERGLOVE SZ 7.5 48975

## (undated) DEVICE — PAD CHUX UNDERPAD 30X36" P3036C

## (undated) DEVICE — DRAPE STERI U 1015

## (undated) DEVICE — ENDO ACCESS PLATFORM GELPOINT MINI CNGL3

## (undated) DEVICE — PREP CHLORAPREP 26ML TINTED ORANGE  260815

## (undated) DEVICE — PREP CHLORAPREP 26ML TINTED HI-LITE ORANGE 930815

## (undated) DEVICE — DRAPE UNDER BUTTOCK 8483

## (undated) DEVICE — SU DERMABOND ADVANCED .7ML DNX12

## (undated) DEVICE — TUBING SUCTION 10'X3/16" N510

## (undated) DEVICE — DRAPE SHOULDER FLUID CONTROL 29367

## (undated) DEVICE — NDL COUNTER 40CT  31142311

## (undated) DEVICE — JELLY LUBRICATING SURGILUBE 2OZ TUBE 0281-0205-02

## (undated) DEVICE — ENDO BRUSH CHANNEL MASTER CLEANING 2-4.2MM BW-412T

## (undated) DEVICE — GLOVE BIOGEL PI INDICATOR 8.0 LF 41680

## (undated) DEVICE — LINEN TOWEL PACK X30 5481

## (undated) DEVICE — DAVINCI XI DRAPE ARM 470015

## (undated) DEVICE — SYR 50ML CATH TIP W/O NDL 309620

## (undated) DEVICE — UTERINE MANIPULATOR RUMI 5.1MMX6CM UML516

## (undated) DEVICE — DRAPE IOBAN INCISE 23X17" 6650EZ

## (undated) DEVICE — TUBING FILTER TRI-LUMEN AIRSEAL ASC-EVAC1

## (undated) DEVICE — SYR PISTON IRRIGATION 60 ML DYND20325

## (undated) DEVICE — WIPES FOLEY CARE SURESTEP PROVON DFC100

## (undated) DEVICE — DRAPE POUCH IRR 1016

## (undated) DEVICE — TUBING SUCTION MEDI-VAC 1/4"X20' N620A

## (undated) DEVICE — BLADE KNIFE SURG 10 371110

## (undated) DEVICE — SU STRATAFIX PDS PLUS 3-0 SPIRAL SH 15CM SXPP1B420

## (undated) DEVICE — CATH TRAY FOLEY SURESTEP 16FR W/URINE MTR STATLK LF A303416A

## (undated) DEVICE — PREP DYNA-HEX 4% CHG SCRUB 4OZ BOTTLE MDS098710

## (undated) DEVICE — STABILIZER ARCH KOH-EFFICIENT 3.5CM KC-ARCH-35

## (undated) DEVICE — SOL WATER IRRIG 1000ML BOTTLE 2F7114

## (undated) DEVICE — SU WND CLOSURE VLOC 0 GS-22 9" VLOCL2246 CL2246

## (undated) DEVICE — SLEEVE COMPRESSION SCD KNEE MED 74022

## (undated) DEVICE — SU MONOCRYL 4-0 PS-2 18" UND Y496G

## (undated) DEVICE — KIT POSITIONER PINK PAD XL ADVANCED 40588

## (undated) DEVICE — GLOVE PROTEXIS PI ORTHO PF 7.5 2D73HT75

## (undated) DEVICE — BUR ARTHREX COOLCUT DISSECTOR 4.0MMX13CM AR-8400DS

## (undated) DEVICE — ADAPTER DRAPE ALLY AU-AD

## (undated) DEVICE — COVER CAMERA IN-LIGHT DISP LT-C02

## (undated) DEVICE — DAVINCI HOT SHEARS TIP COVER  400180

## (undated) DEVICE — NDL INSUFFLATION 13GA 120MM C2201

## (undated) DEVICE — SYR 10ML SLIP TIP W/O NDL 303134

## (undated) DEVICE — GLOVE BIOGEL PI MICRO SZ 7.0 48570

## (undated) DEVICE — DAVINCI XI DRAPE COLUMN 470341

## (undated) DEVICE — ENDO BITE BLOCK 60 MAXI LF 00712804

## (undated) RX ORDER — KETOROLAC TROMETHAMINE 30 MG/ML
INJECTION, SOLUTION INTRAMUSCULAR; INTRAVENOUS
Status: DISPENSED
Start: 2024-03-05

## (undated) RX ORDER — PROPOFOL 10 MG/ML
INJECTION, EMULSION INTRAVENOUS
Status: DISPENSED
Start: 2024-01-22

## (undated) RX ORDER — PROPOFOL 10 MG/ML
INJECTION, EMULSION INTRAVENOUS
Status: DISPENSED
Start: 2019-12-23

## (undated) RX ORDER — HYDROMORPHONE HYDROCHLORIDE 1 MG/ML
INJECTION, SOLUTION INTRAMUSCULAR; INTRAVENOUS; SUBCUTANEOUS
Status: DISPENSED
Start: 2024-03-05

## (undated) RX ORDER — PROPOFOL 10 MG/ML
INJECTION, EMULSION INTRAVENOUS
Status: DISPENSED
Start: 2024-05-07

## (undated) RX ORDER — DEXAMETHASONE SODIUM PHOSPHATE 4 MG/ML
INJECTION, SOLUTION INTRA-ARTICULAR; INTRALESIONAL; INTRAMUSCULAR; INTRAVENOUS; SOFT TISSUE
Status: DISPENSED
Start: 2024-05-07

## (undated) RX ORDER — ONDANSETRON 4 MG/1
TABLET, ORALLY DISINTEGRATING ORAL
Status: DISPENSED
Start: 2024-03-05

## (undated) RX ORDER — GABAPENTIN 300 MG/1
CAPSULE ORAL
Status: DISPENSED
Start: 2024-03-05

## (undated) RX ORDER — EPINEPHRINE 1 MG/ML
INJECTION INTRAMUSCULAR; INTRAVENOUS; SUBCUTANEOUS
Status: DISPENSED
Start: 2024-05-07

## (undated) RX ORDER — LIDOCAINE HYDROCHLORIDE 10 MG/ML
INJECTION, SOLUTION INFILTRATION; PERINEURAL
Status: DISPENSED
Start: 2024-01-26

## (undated) RX ORDER — FENTANYL CITRATE 50 UG/ML
INJECTION, SOLUTION INTRAMUSCULAR; INTRAVENOUS
Status: DISPENSED
Start: 2024-03-05

## (undated) RX ORDER — DEXAMETHASONE SODIUM PHOSPHATE 10 MG/ML
INJECTION, SOLUTION INTRAMUSCULAR; INTRAVENOUS
Status: DISPENSED
Start: 2024-05-07

## (undated) RX ORDER — CEFAZOLIN SODIUM/WATER 2 G/20 ML
SYRINGE (ML) INTRAVENOUS
Status: DISPENSED
Start: 2024-03-05

## (undated) RX ORDER — ONDANSETRON 2 MG/ML
INJECTION INTRAMUSCULAR; INTRAVENOUS
Status: DISPENSED
Start: 2024-03-05

## (undated) RX ORDER — METRONIDAZOLE 500 MG/100ML
INJECTION, SOLUTION INTRAVENOUS
Status: DISPENSED
Start: 2024-03-05

## (undated) RX ORDER — ACETAMINOPHEN 325 MG/1
TABLET ORAL
Status: DISPENSED
Start: 2024-05-07

## (undated) RX ORDER — CEFAZOLIN SODIUM/WATER 2 G/20 ML
SYRINGE (ML) INTRAVENOUS
Status: DISPENSED
Start: 2024-05-07

## (undated) RX ORDER — GLYCOPYRROLATE 0.2 MG/ML
INJECTION, SOLUTION INTRAMUSCULAR; INTRAVENOUS
Status: DISPENSED
Start: 2024-03-05

## (undated) RX ORDER — DIPHENHYDRAMINE HYDROCHLORIDE 50 MG/ML
INJECTION INTRAMUSCULAR; INTRAVENOUS
Status: DISPENSED
Start: 2024-03-05

## (undated) RX ORDER — PROPOFOL 10 MG/ML
INJECTION, EMULSION INTRAVENOUS
Status: DISPENSED
Start: 2024-03-05

## (undated) RX ORDER — LIDOCAINE HYDROCHLORIDE 20 MG/ML
INJECTION, SOLUTION EPIDURAL; INFILTRATION; INTRACAUDAL; PERINEURAL
Status: DISPENSED
Start: 2019-12-23

## (undated) RX ORDER — LIDOCAINE HYDROCHLORIDE 10 MG/ML
INJECTION, SOLUTION EPIDURAL; INFILTRATION; INTRACAUDAL; PERINEURAL
Status: DISPENSED
Start: 2024-05-07

## (undated) RX ORDER — HYDROCODONE BITARTRATE AND ACETAMINOPHEN 5; 325 MG/1; MG/1
TABLET ORAL
Status: DISPENSED
Start: 2024-05-07

## (undated) RX ORDER — ACETAMINOPHEN 325 MG/1
TABLET ORAL
Status: DISPENSED
Start: 2024-03-05

## (undated) RX ORDER — BUPIVACAINE HYDROCHLORIDE 5 MG/ML
INJECTION, SOLUTION EPIDURAL; INTRACAUDAL
Status: DISPENSED
Start: 2024-05-07

## (undated) RX ORDER — ONDANSETRON 2 MG/ML
INJECTION INTRAMUSCULAR; INTRAVENOUS
Status: DISPENSED
Start: 2024-05-07